# Patient Record
Sex: FEMALE | Race: WHITE | NOT HISPANIC OR LATINO | Employment: OTHER | URBAN - NONMETROPOLITAN AREA
[De-identification: names, ages, dates, MRNs, and addresses within clinical notes are randomized per-mention and may not be internally consistent; named-entity substitution may affect disease eponyms.]

---

## 2017-01-04 ENCOUNTER — OFFICE VISIT (OUTPATIENT)
Dept: FAMILY MEDICINE CLINIC | Facility: CLINIC | Age: 82
End: 2017-01-04

## 2017-01-04 VITALS
SYSTOLIC BLOOD PRESSURE: 118 MMHG | HEIGHT: 66 IN | BODY MASS INDEX: 30.05 KG/M2 | DIASTOLIC BLOOD PRESSURE: 68 MMHG | WEIGHT: 187 LBS

## 2017-01-04 DIAGNOSIS — G47.00 INSOMNIA, UNSPECIFIED TYPE: Primary | ICD-10-CM

## 2017-01-04 PROCEDURE — 99213 OFFICE O/P EST LOW 20 MIN: CPT | Performed by: FAMILY MEDICINE

## 2017-01-04 RX ORDER — CEFDINIR 300 MG/1
CAPSULE ORAL
COMMUNITY
Start: 2016-12-29 | End: 2017-03-03

## 2017-01-04 RX ORDER — ALPRAZOLAM 0.25 MG/1
TABLET ORAL
Qty: 90 TABLET | Refills: 0 | Status: SHIPPED | OUTPATIENT
Start: 2017-01-04 | End: 2017-04-10

## 2017-01-04 NOTE — MR AVS SNAPSHOT
Nyla ARNOLD Wang   1/4/2017 2:30 PM   Office Visit    Dept Phone:  [phone]   Encounter #:  [id number]    Provider:  Chantel Long MD   Department:  [facility] PRIMARY CARE [facility]                Your Full Care Plan              Where to Get Your Medications      You can get these medications from any pharmacy     Bring a paper prescription for each of these medications     ALPRAZolam 0.25 MG tablet            Your Updated Medication List          This list is accurate as of: 1/4/17  2:52 PM.  Always use your most recent med list.                ALPRAZolam 0.25 MG tablet   Commonly known as:  XANAX   Take 1 tablet as needed for anxiety       aspirin 81 MG tablet       CALCIUM 600 PO       cefdinir 300 MG capsule   Commonly known as:  OMNICEF       DULoxetine 60 MG capsule   Commonly known as:  CYMBALTA   Take 1 capsule by mouth Daily.       ergocalciferol 95620 UNITS capsule   Commonly known as:  ERGOCALCIFEROL   Take 1 capsule by mouth 1 (one) time per week.       fluticasone 50 MCG/ACT nasal spray   Commonly known as:  FLONASE       gabapentin 300 MG capsule   Commonly known as:  NEURONTIN   Take 1 capsule by mouth 3 (Three) Times a Day. TAKE ONE CAPSULE EVERY MORNING AND 2 CAPSULES EVERY EVENING       HYDROcodone-acetaminophen 7.5-325 MG per tablet   Commonly known as:  NORCO       Insulin Glargine 100 UNIT/ML injection pen   Commonly known as:  LANTUS SOLOSTAR   Inject 15 Units under the skin every night.       levothyroxine 100 MCG tablet   Commonly known as:  SYNTHROID, LEVOTHROID   Take 1 tablet by mouth Daily.       lisinopril 20 MG tablet   Commonly known as:  PRINIVIL,ZESTRIL       loratadine 10 MG tablet   Commonly known as:  CLARITIN   1 TABLET(S) BY MOUTH DAILY       LORazepam 1 MG tablet   Commonly known as:  ATIVAN   Take 1 tablet by mouth At Night As Needed for anxiety or sleep.       metFORMIN 850 MG tablet   Commonly known as:  GLUCOPHAGE   Take 1  "tablet by mouth 2 (Two) Times a Day With Meals.       Needle (Disp) 31G X 5/16\" misc   1 each 4 (Four) Times a Day. Pen Needle;       nitrofurantoin (macrocrystal-monohydrate) 100 MG capsule   Commonly known as:  MACROBID   Take 1 capsule by mouth 2 (Two) Times a Day.       * insulin aspart 100 UNIT/ML solution pen-injector sc pen   Commonly known as:  novoLOG FLEXPEN   Inject 2-8 Units under the skin 3 (three) times a day with meals. INJECT 2-8 UNITS THREE TIMES A DAY BEFORE MEALS       * NOVOLOG FLEXPEN 100 UNIT/ML solution pen-injector sc pen   Generic drug:  insulin aspart   INJECT 2-8 UNITS SUBCUTANEOUSLY THREE TIMES A DAY BEFORE MEALS       simvastatin 20 MG tablet   Commonly known as:  ZOCOR       sulfamethoxazole-trimethoprim 800-160 MG per tablet   Commonly known as:  BACTRIM DS   Take 1 tablet by mouth 2 (two) times a day.       traZODone 50 MG tablet   Commonly known as:  DESYREL   Take 1 tablet by mouth every night. 1-2 tablets at bedtime       TRUEPLUS LANCETS 33G misc   1 each 4 (four) times a day.       * glucose blood test strip   Commonly known as:  TRUETEST TEST   USE TO TEST FOUR TIMES A DAY       * TRUETEST TEST test strip   Generic drug:  glucose blood   USE TO TEST FOUR TIMES A DAY       VENTOLIN HFA IN       * Notice:  This list has 4 medication(s) that are the same as other medications prescribed for you. Read the directions carefully, and ask your doctor or other care provider to review them with you.            Instructions     None    Patient Instructions History      Upcoming Appointments     Visit Type Date Time Department    OFFICE VISIT 1/4/2017  2:30 PM [facility]    FOLLOW UP 1/11/2017  9:15 AM [facility] ORTHOPEDIC [facility]    FOLLOW UP 3/22/2017 10:15 AM [facility] ENDOCRINOLOGY [facility]      MyChart Signup     Our records indicate that you have declined [facility] signup. If you would like to sign up for [employer], please email [email or web address] or call [phone] to obtain an " "activation code.             Other Info from Your Visit           Your Appointments     Jan 11, 2017  9:15 AM CST   Follow Up with Hieu Lozano MD   [facility] ORTHOPEDICS (--)    [address] [place] [address] [address] [place] [address] [phone]           Arrive 15 minutes prior to appointment.            Mar 22, 2017 10:15 AM CDT   Follow Up with MARTHA Gonzalez   [facility] ENDOCRINOLOGY (--)    [address]  [address] [place] [id number] [phone]           Arrive 15 minutes prior to appointment.              Allergies     Penicillins  Swelling, Rash      Reason for Visit     Nasal Congestion was seen in urgent care last thurs.    Cough better with tessalon      Vital Signs     Blood Pressure Height Weight Body Mass Index Smoking Status       118/68 66\" (167.6 cm) 187 lb (84.8 kg) 30.18 kg/m2 Never Smoker         "

## 2017-01-04 NOTE — PROGRESS NOTES
Subjective   Nyla Piña is a 81 y.o. female.  She's here for follow-up on anxiety and difficulty sleeping.  It started when her  passed.  She feels very lonely but denies overt depression.  She takes Xanax most nights to help her relax and sleep is working well    History of Present Illness     The following portions of the patient's history were reviewed and updated as appropriate: allergies, current medications, past family history, past medical history, past social history, past surgical history and problem list.    Review of Systems   Constitutional: Negative.    HENT: Negative.    Respiratory: Negative.  Negative for shortness of breath.    Cardiovascular: Negative.  Negative for chest pain.   Gastrointestinal: Negative.    Musculoskeletal: Negative.  Negative for myalgias.   Skin: Negative.    Allergic/Immunologic: Negative for immunocompromised state.   Neurological: Negative for dizziness, tremors, seizures, syncope, weakness and numbness.   Hematological: Negative.    Psychiatric/Behavioral: Positive for sleep disturbance. Negative for agitation, confusion and dysphoric mood. The patient is not nervous/anxious.        Objective   Physical Exam   Constitutional: She is oriented to person, place, and time. She appears well-developed and well-nourished.   HENT:   Head: Normocephalic and atraumatic.   Nose: Nose normal.   Mouth/Throat: Oropharynx is clear and moist.   Eyes: Conjunctivae and EOM are normal. Pupils are equal, round, and reactive to light.   Neck: Normal range of motion. Neck supple. No JVD present. No tracheal deviation present. No thyromegaly present.   Cardiovascular: Normal rate, regular rhythm, normal heart sounds and intact distal pulses.    No murmur heard.  Pulmonary/Chest: Effort normal and breath sounds normal. She has no wheezes.   Abdominal: Soft. Bowel sounds are normal. She exhibits no distension. There is no tenderness.   Musculoskeletal: Normal range of motion. She  exhibits no edema.   Lymphadenopathy:     She has no cervical adenopathy.   Neurological: She is alert and oriented to person, place, and time. Coordination normal.   Skin: Skin is warm and dry. No rash noted.   Psychiatric: She has a normal mood and affect.   Nursing note and vitals reviewed.      Assessment/Plan   Nyla was seen today for nasal congestion and cough.    Diagnoses and all orders for this visit:    Insomnia, unspecified type    Other orders  -     ALPRAZolam (XANAX) 0.25 MG tablet; Take 1 tablet as needed for anxiety    The patient has read and signed the [facility] Controlled Substance Contract.  I will continue to see patient for regular follow up appointments. Patient is well controlled on the medication.  ANGELITO has been reviewed by me and is updated every 3 months. The patient is aware of the potential for addiction and dependence.   We'll continue Xanax as it helps with her anxiety and sleeplessness at night, recheck in 3 months

## 2017-01-05 RX ORDER — LORAZEPAM 1 MG/1
1 TABLET ORAL NIGHTLY PRN
Qty: 90 TABLET | Refills: 0 | Status: SHIPPED | OUTPATIENT
Start: 2017-01-05 | End: 2017-04-10 | Stop reason: SDUPTHER

## 2017-01-11 ENCOUNTER — OFFICE VISIT (OUTPATIENT)
Dept: ORTHOPEDIC SURGERY | Facility: CLINIC | Age: 82
End: 2017-01-11

## 2017-01-11 VITALS — HEIGHT: 66 IN | BODY MASS INDEX: 29.57 KG/M2 | WEIGHT: 184 LBS

## 2017-01-11 DIAGNOSIS — M25.561 CHRONIC PAIN OF BOTH KNEES: Primary | ICD-10-CM

## 2017-01-11 DIAGNOSIS — M25.562 CHRONIC PAIN OF BOTH KNEES: Primary | ICD-10-CM

## 2017-01-11 DIAGNOSIS — E03.9 HYPOTHYROIDISM, UNSPECIFIED TYPE: ICD-10-CM

## 2017-01-11 DIAGNOSIS — E11.9 TYPE 2 DIABETES MELLITUS WITHOUT COMPLICATION, WITH LONG-TERM CURRENT USE OF INSULIN (HCC): ICD-10-CM

## 2017-01-11 DIAGNOSIS — G89.29 CHRONIC PAIN OF BOTH KNEES: Primary | ICD-10-CM

## 2017-01-11 DIAGNOSIS — M17.0 PRIMARY OSTEOARTHRITIS OF BOTH KNEES: ICD-10-CM

## 2017-01-11 DIAGNOSIS — Z79.4 TYPE 2 DIABETES MELLITUS WITHOUT COMPLICATION, WITH LONG-TERM CURRENT USE OF INSULIN (HCC): ICD-10-CM

## 2017-01-11 PROCEDURE — 99213 OFFICE O/P EST LOW 20 MIN: CPT | Performed by: ORTHOPAEDIC SURGERY

## 2017-01-11 PROCEDURE — 20610 DRAIN/INJ JOINT/BURSA W/O US: CPT | Performed by: ORTHOPAEDIC SURGERY

## 2017-01-11 RX ADMIN — TRIAMCINOLONE ACETONIDE 40 MG: 40 INJECTION, SUSPENSION INTRA-ARTICULAR; INTRAMUSCULAR at 10:16

## 2017-01-11 RX ADMIN — LIDOCAINE HYDROCHLORIDE 2 ML: 20 INJECTION, SOLUTION INFILTRATION; PERINEURAL at 10:16

## 2017-01-11 NOTE — MR AVS SNAPSHOT
"                        Nyla Burroughsvis   1/11/2017 9:15 AM   Office Visit    Dept Phone:  [phone]   Encounter #:  [id number]    Provider:  Hieu Lozano MD   Department:  [facility] ORTHOPEDICS                Your Full Care Plan              Your Updated Medication List          This list is accurate as of: 1/11/17 11:02 AM.  Always use your most recent med list.                ALPRAZolam 0.25 MG tablet   Commonly known as:  XANAX   Take 1 tablet as needed for anxiety       aspirin 81 MG tablet       CALCIUM 600 PO       cefdinir 300 MG capsule   Commonly known as:  OMNICEF       DULoxetine 60 MG capsule   Commonly known as:  CYMBALTA   Take 1 capsule by mouth Daily.       ergocalciferol 92591 UNITS capsule   Commonly known as:  ERGOCALCIFEROL   Take 1 capsule by mouth 1 (one) time per week.       fluticasone 50 MCG/ACT nasal spray   Commonly known as:  FLONASE       gabapentin 300 MG capsule   Commonly known as:  NEURONTIN   Take 1 capsule by mouth 3 (Three) Times a Day. TAKE ONE CAPSULE EVERY MORNING AND 2 CAPSULES EVERY EVENING       HYDROcodone-acetaminophen 7.5-325 MG per tablet   Commonly known as:  NORCO       Insulin Glargine 100 UNIT/ML injection pen   Commonly known as:  LANTUS SOLOSTAR   Inject 15 Units under the skin every night.       levothyroxine 100 MCG tablet   Commonly known as:  SYNTHROID, LEVOTHROID   Take 1 tablet by mouth Daily.       lisinopril 20 MG tablet   Commonly known as:  PRINIVIL,ZESTRIL       loratadine 10 MG tablet   Commonly known as:  CLARITIN   1 TABLET(S) BY MOUTH DAILY       LORazepam 1 MG tablet   Commonly known as:  ATIVAN   Take 1 tablet by mouth At Night As Needed for anxiety or sleep.       metFORMIN 850 MG tablet   Commonly known as:  GLUCOPHAGE   Take 1 tablet by mouth 2 (Two) Times a Day With Meals.       Needle (Disp) 31G X 5/16\" misc   1 each 4 (Four) Times a Day. Pen Needle;       nitrofurantoin (macrocrystal-monohydrate) 100 MG " capsule   Commonly known as:  MACROBID   Take 1 capsule by mouth 2 (Two) Times a Day.       * insulin aspart 100 UNIT/ML solution pen-injector sc pen   Commonly known as:  novoLOG FLEXPEN   Inject 2-8 Units under the skin 3 (three) times a day with meals. INJECT 2-8 UNITS THREE TIMES A DAY BEFORE MEALS       * NOVOLOG FLEXPEN 100 UNIT/ML solution pen-injector sc pen   Generic drug:  insulin aspart   INJECT 2-8 UNITS SUBCUTANEOUSLY THREE TIMES A DAY BEFORE MEALS       simvastatin 20 MG tablet   Commonly known as:  ZOCOR       sulfamethoxazole-trimethoprim 800-160 MG per tablet   Commonly known as:  BACTRIM DS   Take 1 tablet by mouth 2 (two) times a day.       traZODone 50 MG tablet   Commonly known as:  DESYREL   Take 1 tablet by mouth every night. 1-2 tablets at bedtime       TRUEPLUS LANCETS 33G misc   1 each 4 (four) times a day.       * glucose blood test strip   Commonly known as:  TRUETEST TEST   USE TO TEST FOUR TIMES A DAY       * TRUETEST TEST test strip   Generic drug:  glucose blood   USE TO TEST FOUR TIMES A DAY       VENTOLIN HFA IN       * Notice:  This list has 4 medication(s) that are the same as other medications prescribed for you. Read the directions carefully, and ask your doctor or other care provider to review them with you.            We Performed the Following     Ambulatory Referral to Physical Therapy Evaluate and treat     Large Joint Arthrocentesis       You Were Diagnosed With        Codes Comments    Chronic pain of both knees    -  Primary ICD-10-CM: M25.561, M25.562, G89.29  ICD-9-CM: 719.46, 338.29     Primary osteoarthritis of both knees     ICD-10-CM: M17.0  ICD-9-CM: 715.16       Instructions     None    Patient Instructions History      Upcoming Appointments     Visit Type Date Time Department    FOLLOW UP 1/11/2017  9:15 AM [facility] ORTHOPEDIC POW    FOLLOW UP 3/22/2017 10:15 AM [facility] ENDOCRINOLOGY BARBARA Ward Signup     Our records indicate that you have declined [facility]  "MyChart signup. If you would like to sign up for [employer], please email [email or web address] or call [phone] to obtain an activation code.             Other Info from Your Visit           Your Appointments     Mar 22, 2017 10:15 AM CDT   Follow Up with MARTHA Gonzalez   [facility] ENDOCRINOLOGY (--)    [address]  [facility] [address] [place], [place] [id number]   [phone]           Arrive 15 minutes prior to appointment.              Allergies     Penicillins  Swelling, Rash      Reason for Visit     Left Knee - Follow-up     Right Knee - Follow-up           Vital Signs     Height Weight Body Mass Index Smoking Status          66\" (167.6 cm) 184 lb (83.5 kg) 29.7 kg/m2 Never Smoker        Problems and Diagnoses Noted     Chronic pain of both knees    Primary osteoarthritis of both knees        "

## 2017-01-11 NOTE — PROGRESS NOTES
Nyla Piña is a 81 y.o. female returns for     Chief Complaint   Patient presents with   • Left Knee - Follow-up   • Right Knee - Follow-up       HISTORY OF PRESENT ILLNESS:patient completed series of orthovisc injections on 8/2/2016, did not help much.   She is complaining of pain with activity in her right knee.  Her pain is mostly a dull ache but she does have some occasional sharp pains.  Previous injections did not help.  They did help on her left side.     CONCURRENT MEDICAL HISTORY:    Past Medical History   Diagnosis Date   • Acute bronchitis    • Acute sinusitis    • Acute upper respiratory infection    • Anxiety    • Cellulitis of lower leg    • Chronic urinary tract infection    • Cough    • Death of     • Diabetic asymmetric polyneuropathy    • Diabetic peripheral neuropathy    • Diabetic polyneuropathy    • Disease of nail    • Dorsalgia    • Flank pain    • Hashimoto's thyroiditis    • History of echocardiogram 02/19/2013   • Hypertensive disorder    • Insomnia    • Mixed hyperlipidemia    • Non-healing surgical wound    • Osteoarthritis of multiple joints    • Peripheral vascular disease    • Seasonal allergic rhinitis    • Type II diabetes mellitus, uncontrolled    • Upper respiratory infection    • Urinary tract infectious disease    • Urinary tract infectious disease    • Vitamin D deficiency        Allergies   Allergen Reactions   • Penicillins Swelling and Rash         Current Outpatient Prescriptions:   •  Albuterol Sulfate (VENTOLIN HFA IN), Inhale 2 puffs every 4 (four) hours as needed. Ventolin HFA 90 mcg, Disp: , Rfl:   •  ALPRAZolam (XANAX) 0.25 MG tablet, Take 1 tablet as needed for anxiety, Disp: 90 tablet, Rfl: 0  •  aspirin 81 MG tablet, Take 81 mg by mouth daily., Disp: , Rfl:   •  Calcium Carbonate (CALCIUM 600 PO), Take 2 tablets by mouth daily., Disp: , Rfl:   •  cefdinir (OMNICEF) 300 MG capsule, , Disp: , Rfl:   •  DULoxetine (CYMBALTA) 60 MG capsule, Take 1 capsule  "by mouth Daily., Disp: 90 capsule, Rfl: 1  •  ergocalciferol (ERGOCALCIFEROL) 93661 UNITS capsule, Take 1 capsule by mouth 1 (one) time per week., Disp: 12 capsule, Rfl: 3  •  fluticasone (FLONASE) 50 MCG/ACT nasal spray, 1 spray into each nostril 2 (two) times a day., Disp: , Rfl:   •  gabapentin (NEURONTIN) 300 MG capsule, Take 1 capsule by mouth 3 (Three) Times a Day. TAKE ONE CAPSULE EVERY MORNING AND 2 CAPSULES EVERY EVENING, Disp: 270 capsule, Rfl: 1  •  glucose blood (TRUETEST TEST) test strip, USE TO TEST FOUR TIMES A DAY, Disp: 100 each, Rfl: 5  •  HYDROcodone-acetaminophen (NORCO) 7.5-325 MG per tablet, Take 1 tablet by mouth 2 (two) times a day., Disp: , Rfl:   •  insulin aspart (NOVOLOG FLEXPEN) 100 UNIT/ML solution pen-injector sc pen, Inject 2-8 Units under the skin 3 (three) times a day with meals. INJECT 2-8 UNITS THREE TIMES A DAY BEFORE MEALS, Disp: 8 package, Rfl: 2  •  Insulin Glargine (LANTUS SOLOSTAR) 100 UNIT/ML injection pen, Inject 15 Units under the skin every night., Disp: 9 mL, Rfl: 5  •  levothyroxine (SYNTHROID, LEVOTHROID) 100 MCG tablet, Take 1 tablet by mouth Daily., Disp: 90 tablet, Rfl: 1  •  lisinopril (PRINIVIL,ZESTRIL) 20 MG tablet, Take 20 mg by mouth daily., Disp: , Rfl:   •  loratadine (CLARITIN) 10 MG tablet, 1 TABLET(S) BY MOUTH DAILY, Disp: 30 tablet, Rfl: 5  •  LORazepam (ATIVAN) 1 MG tablet, Take 1 tablet by mouth At Night As Needed for anxiety or sleep., Disp: 90 tablet, Rfl: 0  •  metFORMIN (GLUCOPHAGE) 850 MG tablet, Take 1 tablet by mouth 2 (Two) Times a Day With Meals., Disp: 180 tablet, Rfl: 1  •  Needle, Disp, 31G X 5/16\" misc, 1 each 4 (Four) Times a Day. Pen Needle;, Disp: 400 each, Rfl: 1  •  nitrofurantoin, macrocrystal-monohydrate, (MACROBID) 100 MG capsule, Take 1 capsule by mouth 2 (Two) Times a Day., Disp: 20 capsule, Rfl: 0  •  NOVOLOG FLEXPEN 100 UNIT/ML solution pen-injector sc pen, INJECT 2-8 UNITS SUBCUTANEOUSLY THREE TIMES A DAY BEFORE MEALS, Disp: " "15 mL, Rfl: 1  •  simvastatin (ZOCOR) 20 MG tablet, Take 20 mg by mouth every night., Disp: , Rfl:   •  traZODone (DESYREL) 50 MG tablet, Take 1 tablet by mouth every night. 1-2 tablets at bedtime, Disp: 180 tablet, Rfl: 0  •  TRUEPLUS LANCETS 33G misc, 1 each 4 (four) times a day., Disp: 4 each, Rfl: 0  •  TRUETEST TEST test strip, USE TO TEST FOUR TIMES A DAY, Disp: 120 each, Rfl: 3  •  sulfamethoxazole-trimethoprim (BACTRIM DS) 800-160 MG per tablet, Take 1 tablet by mouth 2 (two) times a day., Disp: 20 tablet, Rfl: 0    Past Surgical History   Procedure Laterality Date   • Steroid injection  02/08/2016     Depo Medrol (Methylprednisone) 80mg (Acute upper respiratory infection, unspecified)    • Excision mass leg Left 11/05/2013     Excision of soft tissue mass of left leg       ROS  No fevers or chills.  No chest pain or shortness of air.  No GI or  disturbances.    PHYSICAL EXAMINATION:       Visit Vitals   • Ht 66\" (167.6 cm)   • Wt 184 lb (83.5 kg)   • BMI 29.7 kg/m2       Physical Exam   Constitutional: She is oriented to person, place, and time. She appears well-developed and well-nourished.   Neurological: She is alert and oriented to person, place, and time.   Psychiatric: She has a normal mood and affect. Her behavior is normal. Judgment and thought content normal.       GAIT:     []  Normal  [x]  Antalgic    Assistive device: []  None  []  Walker     []  Crutches  [x]  Cane     []  Wheelchair  []  Stretcher    Right Knee Exam     Tenderness   Right knee tenderness location: diffuse.    Range of Motion   Extension: -5   Flexion: 120     Muscle Strength     The patient has normal right knee strength.    Tests   Drawer:       Anterior - negative    Posterior - negative  Varus: negative  Valgus: negative    Other   Sensation: normal  Pulse: present  Swelling: mild    Comments:  Crepitation on motion.  Mild to moderate pain through arc of motion      Left Knee Exam     Tenderness   Left knee tenderness " location: diffuse.    Range of Motion   Extension: -5   Flexion: 120     Muscle Strength     The patient has normal left knee strength.    Tests   Drawer:       Anterior - negative     Posterior - negative  Varus: negative  Valgus: negative    Other   Sensation: normal  Pulse: present  Swelling: none    Comments:  Crepitation with motion  Mild to moderate pain through arc of motion                  Large Joint Arthrocentesis  Date/Time: 1/11/2017 10:16 AM  Consent given by: patient  Site marked: site marked  Timeout: Immediately prior to procedure a time out was called to verify the correct patient, procedure, equipment, support staff and site/side marked as required   Supporting Documentation  Indications: pain   Procedure Details  Location: knee - R knee  Preparation: Patient was prepped and draped in the usual sterile fashion  Needle size: 22 G  Approach: anteromedial  Medications administered: 40 mg triamcinolone acetonide 40 MG/ML; 2 mL lidocaine 2 %  Patient tolerance: patient tolerated the procedure well with no immediate complications              ASSESSMENT:    Diagnoses and all orders for this visit:    Chronic pain of both knees  -     Large Joint Arthrocentesis  -     Ambulatory Referral to Physical Therapy Evaluate and treat    Primary osteoarthritis of both knees  -     Large Joint Arthrocentesis  -     Ambulatory Referral to Physical Therapy Evaluate and treat    Type 2 diabetes mellitus without complication, with long-term current use of insulin    Hypothyroidism, unspecified type          PLAN    Long discussion regarding treatment modalities for osteoarthritis of the knees.  Patient does not want to pursue any kind of surgical intervention at this time.  The Visco supplementation was not beneficial on the right leg.  Her symptoms are improved on the left and therefore we discussed steroid injection into the right knee.  We discussed risks benefits and alternatives to steroid injection patient  conveyed understanding and was in agreement to proceed.  We discussed the possibility of elevated blood sugar and she was going to closely monitor her blood sugar as necessary.  She also is going to begin physical therapy for range of motion strengthening and conditioning and the institution of a home exercise program.    Return in about 3 months (around 4/11/2017).    Hieu Lozano MD

## 2017-01-15 PROBLEM — Z79.4 TYPE 2 DIABETES MELLITUS WITHOUT COMPLICATION, WITH LONG-TERM CURRENT USE OF INSULIN (HCC): Status: ACTIVE | Noted: 2017-01-15

## 2017-01-15 PROBLEM — E11.9 TYPE 2 DIABETES MELLITUS WITHOUT COMPLICATION, WITH LONG-TERM CURRENT USE OF INSULIN (HCC): Status: ACTIVE | Noted: 2017-01-15

## 2017-01-15 RX ORDER — LIDOCAINE HYDROCHLORIDE 20 MG/ML
2 INJECTION, SOLUTION INFILTRATION; PERINEURAL
Status: COMPLETED | OUTPATIENT
Start: 2017-01-11 | End: 2017-01-11

## 2017-01-15 RX ORDER — TRIAMCINOLONE ACETONIDE 40 MG/ML
40 INJECTION, SUSPENSION INTRA-ARTICULAR; INTRAMUSCULAR
Status: COMPLETED | OUTPATIENT
Start: 2017-01-11 | End: 2017-01-11

## 2017-01-20 ENCOUNTER — CONSULT (OUTPATIENT)
Dept: PHYSICAL THERAPY | Facility: CLINIC | Age: 82
End: 2017-01-20

## 2017-01-20 DIAGNOSIS — G89.29 BILATERAL CHRONIC KNEE PAIN: Primary | ICD-10-CM

## 2017-01-20 DIAGNOSIS — M25.562 BILATERAL CHRONIC KNEE PAIN: Primary | ICD-10-CM

## 2017-01-20 DIAGNOSIS — M25.561 BILATERAL CHRONIC KNEE PAIN: Primary | ICD-10-CM

## 2017-01-20 PROCEDURE — 97162 PT EVAL MOD COMPLEX 30 MIN: CPT | Performed by: PHYSICAL THERAPIST

## 2017-01-20 PROCEDURE — G8978 MOBILITY CURRENT STATUS: HCPCS | Performed by: PHYSICAL THERAPIST

## 2017-01-20 PROCEDURE — 97110 THERAPEUTIC EXERCISES: CPT | Performed by: PHYSICAL THERAPIST

## 2017-01-20 PROCEDURE — G8979 MOBILITY GOAL STATUS: HCPCS | Performed by: PHYSICAL THERAPIST

## 2017-01-20 NOTE — MR AVS SNAPSHOT
"  [facility] PHYSICAL THERAPY  [phone]                    Nyla Piña   1/20/2017 9:30 AM   Consult    Dept Phone:  [phone]   Encounter #:  [id number]    Provider:  Edith Rhoades PT   Department:  [facility] PHYSICAL THERAPY                Your Full Care Plan              Your Updated Medication List          This list is accurate as of: 1/20/17 10:12 AM.  Always use your most recent med list.                ALPRAZolam 0.25 MG tablet   Commonly known as:  XANAX   Take 1 tablet as needed for anxiety       aspirin 81 MG tablet       CALCIUM 600 PO       cefdinir 300 MG capsule   Commonly known as:  OMNICEF       DULoxetine 60 MG capsule   Commonly known as:  CYMBALTA   Take 1 capsule by mouth Daily.       ergocalciferol 54712 UNITS capsule   Commonly known as:  ERGOCALCIFEROL   Take 1 capsule by mouth 1 (one) time per week.       fluticasone 50 MCG/ACT nasal spray   Commonly known as:  FLONASE       gabapentin 300 MG capsule   Commonly known as:  NEURONTIN   Take 1 capsule by mouth 3 (Three) Times a Day. TAKE ONE CAPSULE EVERY MORNING AND 2 CAPSULES EVERY EVENING       HYDROcodone-acetaminophen 7.5-325 MG per tablet   Commonly known as:  NORCO       Insulin Glargine 100 UNIT/ML injection pen   Commonly known as:  LANTUS SOLOSTAR   Inject 15 Units under the skin every night.       levothyroxine 100 MCG tablet   Commonly known as:  SYNTHROID, LEVOTHROID   Take 1 tablet by mouth Daily.       lisinopril 20 MG tablet   Commonly known as:  PRINIVIL,ZESTRIL       loratadine 10 MG tablet   Commonly known as:  CLARITIN   1 TABLET(S) BY MOUTH DAILY       LORazepam 1 MG tablet   Commonly known as:  ATIVAN   Take 1 tablet by mouth At Night As Needed for anxiety or sleep.       metFORMIN 850 MG tablet   Commonly known as:  GLUCOPHAGE   Take 1 tablet by mouth 2 (Two) Times a Day With Meals.       Needle (Disp) 31G X 5/16\" misc   1 each 4 (Four) Times a Day. Pen Needle;       nitrofurantoin " (macrocrystal-monohydrate) 100 MG capsule   Commonly known as:  MACROBID   Take 1 capsule by mouth 2 (Two) Times a Day.       * insulin aspart 100 UNIT/ML solution pen-injector sc pen   Commonly known as:  novoLOG FLEXPEN   Inject 2-8 Units under the skin 3 (three) times a day with meals. INJECT 2-8 UNITS THREE TIMES A DAY BEFORE MEALS       * NOVOLOG FLEXPEN 100 UNIT/ML solution pen-injector sc pen   Generic drug:  insulin aspart   INJECT 2-8 UNITS SUBCUTANEOUSLY THREE TIMES A DAY BEFORE MEALS       simvastatin 20 MG tablet   Commonly known as:  ZOCOR       sulfamethoxazole-trimethoprim 800-160 MG per tablet   Commonly known as:  BACTRIM DS   Take 1 tablet by mouth 2 (two) times a day.       traZODone 50 MG tablet   Commonly known as:  DESYREL   Take 1 tablet by mouth every night. 1-2 tablets at bedtime       TRUEPLUS LANCETS 33G misc   1 each 4 (four) times a day.       * glucose blood test strip   Commonly known as:  TRUETEST TEST   USE TO TEST FOUR TIMES A DAY       * TRUETEST TEST test strip   Generic drug:  glucose blood   USE TO TEST FOUR TIMES A DAY       VENTOLIN HFA IN       * Notice:  This list has 4 medication(s) that are the same as other medications prescribed for you. Read the directions carefully, and ask your doctor or other care provider to review them with you.            We Performed the Following     PT Plan of Care Cert / Re-Cert       You Were Diagnosed With        Codes Comments    Bilateral chronic knee pain    -  Primary ICD-10-CM: M25.561, M25.562, G89.29  ICD-9-CM: 719.46, 338.29       Instructions     None    Patient Instructions History      Upcoming Appointments     Visit Type Date Time Department    EVALUATION 1/20/2017  9:30 AM W [facility] TH[facility]    FOLLOW UP 3/22/2017 10:15 AM W ENDOCRINOLOGY [facility]      MyChart Signup     Our records indicate that you have declined [facility] sign[facility]. If you would like to sign up for [facility], please email [email or web address]  or call [phone] to obtain an activation code.             Other Info from Your Visit           Your Appointments     Mar 22, 2017 10:15 AM CDT   Follow Up with MARTHA Gonzalez   [facility] ENDOCRINOLOGY (--)    [address]  [facility] [place] [address] [place] [id number]   [phone]           Arrive 15 minutes prior to appointment.              Allergies     Penicillins  Swelling, Rash      Reason for Visit     Right Knee - Pain     Left Knee - Pain     PT Initial Evaluation           Vital Signs     Smoking Status                   Never Smoker           Problems and Diagnoses Noted     Bilateral chronic knee pain    -  Primary

## 2017-01-20 NOTE — PROGRESS NOTES
Outpatient Physical Therapy Ortho Initial Evaluation       Patient Name: Nyla Piña  : 1935  MRN: [id number]  Today's Date: 2017      Visit Date: 2017    TIME IN 9:23    TIME OUT 10:09    Patient Active Problem List   Diagnosis   • Acute bronchitis   • Acute sinusitis   • Acute upper respiratory infection   • Anxiety   • Cellulitis of lower leg   • Chronic urinary tract infection   • Cough   • Death of    • Diabetic asymmetric polyneuropathy   • Diabetic peripheral neuropathy   • Diabetic polyneuropathy   • Disease of nail   • Dorsalgia   • Flank pain   • Hashimoto's thyroiditis   • History of echocardiogram   • Hypertensive disorder   • Insomnia   • Mixed hyperlipidemia   • Non-healing surgical wound   • Osteoarthritis of multiple joints   • Peripheral vascular disease   • Seasonal allergic rhinitis   • Type II diabetes mellitus, uncontrolled   • Upper respiratory infection   • Urinary tract infectious disease   • Vitamin D deficiency   • Chronic pain of both knees   • Primary osteoarthritis of both knees   • Type 2 diabetes mellitus without complication, with long-term current use of insulin        Past Medical History   Diagnosis Date   • Acute bronchitis    • Acute sinusitis    • Acute upper respiratory infection    • Anxiety    • Cellulitis of lower leg    • Chronic urinary tract infection    • Cough    • Death of     • Diabetic asymmetric polyneuropathy    • Diabetic peripheral neuropathy    • Diabetic polyneuropathy    • Disease of nail    • Dorsalgia    • Flank pain    • Hashimoto's thyroiditis    • History of echocardiogram 2013   • Hypertensive disorder    • Insomnia    • Mixed hyperlipidemia    • Non-healing surgical wound    • Osteoarthritis of multiple joints    • Peripheral vascular disease    • Seasonal allergic rhinitis    • Type II diabetes mellitus, uncontrolled    • Upper respiratory infection    • Urinary tract infectious disease    • Urinary tract  infectious disease    • Vitamin D deficiency         Past Surgical History   Procedure Laterality Date   • Steroid injection  02/08/2016     Depo Medrol (Methylprednisone) 80mg (Acute upper respiratory infection, unspecified)    • Excision mass leg Left 11/05/2013     Excision of soft tissue mass of left leg        Outpatient Medications     Albuterol Sulfate (VENTOLIN HFA IN)      ALPRAZolam (XANAX) 0.25 MG tablet      aspirin 81 MG tablet      Calcium Carbonate (CALCIUM 600 PO)      cefdinir (OMNICEF) 300 MG capsule      DULoxetine (CYMBALTA) 60 MG capsule      ergocalciferol (ERGOCALCIFEROL) 95611 UNITS capsule      fluticasone (FLONASE) 50 MCG/ACT nasal spray      gabapentin (NEURONTIN) 300 MG capsule      glucose blood (TRUETEST TEST) test strip      HYDROcodone-acetaminophen (NORCO) 7.5-325 MG per tablet      insulin aspart (NOVOLOG FLEXPEN) 100 UNIT/ML solution pen-injector sc pen      Insulin Glargine (LANTUS SOLOSTAR) 100 UNIT/ML injection pen      levothyroxine (SYNTHROID, LEVOTHROID) 100 MCG tablet      lisinopril (PRINIVIL,ZESTRIL) 20 MG tablet      loratadine (CLARITIN) 10 MG tablet      LORazepam (ATIVAN) 1 MG tablet      metFORMIN (GLUCOPHAGE) 850 MG tablet            nitrofurantoin, macrocrystal-monohydrate, (MACROBID) 100 MG capsule      NOVOLOG FLEXPEN 100 UNIT/ML solution pen-injector sc pen      simvastatin (ZOCOR) 20 MG tablet      sulfamethoxazole-trimethoprim (BACTRIM DS) 800-160 MG per tablet      traZODone (DESYREL) 50 MG tablet      TRUEPLUS LANCETS 33G misc      TRUETEST TEST test strip    Allergies:        PenicillinsSwelling, Rash     Visit Dx:     ICD-10-CM ICD-9-CM   1. Bilateral chronic knee pain M25.561 719.46    M25.562 338.29    G89.29    Subjective Evaluation    History of Present Illness  Onset date: Cortisone shots for years 5 or more.  Mechanism of injury: Started synvisc injections in August the left one improved.  Right one was injected with cortisone last MD visit. Feels  much better now.    Quality of life: good    Pain  Current pain rating: 3  Quality: tight and pressure  Relieving factors: rest and medications  Aggravating factors: movement (Walking and standing)  Progression: improved    Social Support  Lives in: one-story house  Lives with: alone    Hand dominance: right    Diagnostic Tests  X-ray: abnormal    Treatments  Previous treatment: injection treatment  Patient Goals  Patient goals for therapy: decreased pain, increased strength, independence with ADLs/IADLs and increased motion  Patient goal: 1.  Flexion  Degrees  2.  Extension ROM 3  Degrees  3. LEFS score 40 /80.  4.  Patient to report 30% subjective improvement.   LT. Flexion   2. Extension 2 degrees  3. LEFS 44/80  4. Quadriceps and Hamstring MMT 5/5  5. Independent in HEP.    OBJECTIVE:     No assitive device.  Knees flexed   Diffuse TTP of both joints.    AROM RLE (degrees)  R Hip Flexion 0-125: 110  R Hip ABduction 0-45: 35  R Knee Flexion 0-140: 128     Strength RLE  R Hip Flexion: 4+/5  R Knee Flexion: 4+/5  R Knee Extension: 4+/5        AROM LLE (degrees)  L Hip Flexion 0-125: 110  L Knee Flexion 0-140: 127     Strength LLE  L Hip Flexion: 5-/5  L Knee Flexion: 4+/5  L Knee Extension: 4+/5        PROCEDURES AND MODALITIES:     EXERCISE  Exercise 1  Exercise Name 1: Isometric add  Sets/Reps 1: 30  Exercise 1 Completed: Yes  Exercise 1 Home Program: Yes  Exercise 2  Exercise Name 2: SAQ  Sets/Reps 2: 20  Exercise 2 Completed: Yes  Exercise 2 Home Program: Yes Exercise 3  Exercise Name 3: Quad sets/glut sets  Sets/Reps 3: 20  Exercise 3 Completed: Yes  Exercise 3 Home Program: Yes Exercise 4  Exercise Name 4: Ham sets  Sets/Reps 4: 20  Exercise 4 Home Program: Yes Exercise 5  Exercise Name 5: Clam shells  Sets/Reps 5: 20  Exercise 5 Home Program: Yes Exercise 6  Exercise Name 6: LAQ  Sets/Reps 6: 20  Exercise 6 Home Program: Yes                                           Assessment & Plan      Assessment  Impairments: pain with function  Prognosis: fair    Goals  1.  Flexion  Degrees  2.  Extension ROM 3  Degrees  3. LEFS score 40 /80.  4.  Patient to report 30% subjective improvement.   LT. Flexion   2. Extension 2 degrees  3. LEFS 44/80  4. Quadriceps and Hamstring MMT 5/5  5. Independent in HEP.    Plan  Therapy options: will be seen for skilled physical therapy services  Frequency: 1x week  Duration in weeks: 4  Treatment plan discussed with: patient  Plan details: Lower extremity strengthening. Gait and balance strengthening.    Time Calculation: 46      PT G-Codes  PT Professional Judgement Used?: Yes  Outcome Measure Options: Lower Extremity Functional Scale (LEFS)  Score: 33  Functional Limitation: Mobility: Walking and moving around  Mobility: Walking and Moving Around Current Status (): At least 40 percent but less than 60 percent impaired, limited or restricted  Mobility: Walking and Moving Around Goal Status (): At least 1 percent but less than 20 percent impaired, limited or restricted       Edith Rhoades, PT,  ATC, DPT  2017      Nyla Piña    1935

## 2017-01-30 ENCOUNTER — TREATMENT (OUTPATIENT)
Dept: PHYSICAL THERAPY | Facility: CLINIC | Age: 82
End: 2017-01-30

## 2017-01-30 DIAGNOSIS — M25.561 BILATERAL CHRONIC KNEE PAIN: Primary | ICD-10-CM

## 2017-01-30 DIAGNOSIS — G89.29 BILATERAL CHRONIC KNEE PAIN: Primary | ICD-10-CM

## 2017-01-30 DIAGNOSIS — M25.562 BILATERAL CHRONIC KNEE PAIN: Primary | ICD-10-CM

## 2017-01-30 PROCEDURE — 97110 THERAPEUTIC EXERCISES: CPT | Performed by: PHYSICAL THERAPIST

## 2017-01-30 NOTE — PROGRESS NOTES
Daily Progress Note    Time In: 12:30      Time Out: 1:13    ICD-10-CM ICD-9-CM   1. Bilateral chronic knee pain M25.561 719.46    M25.562 338.29    G89.29        Subjective   Pt c/o R knee pain. She has some trouble with 2 HEP exercises.   Patient reports to therapy 4/10 pain on numerical analogue scale.  Attendance  2/2 visits.       Objective     V cues necessary for correct TE performance. Corrected 2 HEP exercises that were bothering HEP.     AROM:    Knee flex: L 124°, R 125°.         EXERCISE  Exercise 1  Exercise Name 1: Bike   Sets/Reps 1: 3 min  Exercise 1 Completed: Yes  Exercise 2  Exercise Name 2: Hip add  Equipment/Resistance 2: 12x (Decreased reps sec to some pain after ex. )  Exercise 2 Completed: Yes Exercise 3  Exercise Name 3: Clamshells  B  Sets/Reps 3: 2/10x  Exercise 3 Completed: Yes Exercise 4  Exercise Name 4: SAQ  Equipment/Resistance 4: 1#  Sets/Reps 4: 2/10x  Exercise 4 Completed: Yes Exercise 5  Exercise Name 5: SLR  Sets/Reps 5: 2/10x  Exercise 5 Completed: Yes Exercise 6  Exercise Name 6: Bridges  Sets/Reps 6: 2/10x  Exercise 6 Completed: Yes   Exercise 7  Exercise Name 7: HS curls  Equipment/Resistance 7: yellow tb  Sets/Reps 7: 20x  Exercise 7 Completed: Yes Exercise 8  Exercise Name 8: LAQ  Equipment/Resistance 8: 2#  Sets/Reps 8: 20x  Exercise 8 Completed: Yes Exercise 9  Exercise Name 9: CR  Sets/Reps 9: 20x  Exercise 9 Completed: Yes Exercise 10  Exercise Name 10: Airex: [place]om[place], marching  Exercise 10 Completed: Yes                                   Therapy Exercise 79324 43 minutes    Total Treatment Time: 43 Minutes    Assessment/Plan   Knee ROM measurements about the same. Good tolerance of today's treatment. Pt compliant with HEP  Progress per Plan of Care             Andrea Fuller, PTA  Physical Therapist

## 2017-02-01 ENCOUNTER — TREATMENT (OUTPATIENT)
Dept: PHYSICAL THERAPY | Facility: CLINIC | Age: 82
End: 2017-02-01

## 2017-02-01 DIAGNOSIS — M25.561 BILATERAL CHRONIC KNEE PAIN: Primary | ICD-10-CM

## 2017-02-01 DIAGNOSIS — G89.29 BILATERAL CHRONIC KNEE PAIN: Primary | ICD-10-CM

## 2017-02-01 DIAGNOSIS — M25.562 BILATERAL CHRONIC KNEE PAIN: Primary | ICD-10-CM

## 2017-02-01 PROCEDURE — 97110 THERAPEUTIC EXERCISES: CPT | Performed by: PHYSICAL THERAPIST

## 2017-02-01 NOTE — PROGRESS NOTES
"Daily Progress Note    Time In: 2:43    Time Out: 3:24    ICD-10-CM ICD-9-CM   1. Bilateral chronic knee pain M25.561 719.46    M25.562 338.29    G89.29        Subjective   Pt reports continued R knee pain. She is doing HEP. Some mild pain increase after prev treatment.   Patient reports to therapy 4/10 pain on numerical analogue scale, and  \"some\" improvement.  Attendance  3/3 visits.       Objective    Amb with AG. V cues necessary for correct TE performance. No c/o increased pain during TE, or post.       EXERCISE  Exercise 1  Exercise Name 1: Bike   Sets/Reps 1: 5 min  Exercise 1 Completed: Yes  Exercise 2  Exercise Name 2: HS stretch  B  Sets/Reps 2: 2/30\"  Exercise 2 Completed: Yes Exercise 3  Exercise Name 3: SAQ  Sets/Reps 3: 2/20x  Exercise 3 Completed: Yes Exercise 4  Exercise Name 4: Hip add squeeze  Sets/Reps 4: 30x  Exercise 4 Completed: Yes Exercise 5  Exercise Name 5: SLR  Sets/Reps 5: 2/10x  Exercise 5 Completed: Yes Exercise 6  Exercise Name 6: Bridges  Sets/Reps 6: 2/20x  Exercise 6 Completed: Yes   Exercise 7  Exercise Name 7: HS curls  Equipment/Resistance 7: yellow tb  Sets/Reps 7: 20x  Exercise 7 Completed: Yes Exercise 8  Exercise Name 8: LAQ  Equipment/Resistance 8: 3#  Sets/Reps 8: 2/20x  Exercise 8 Completed: Yes Exercise 9  Exercise Name 9: Sidestep in // bars  Sets/Reps 9: 3 trips  Exercise 9 Completed: Yes Exercise 10  Exercise Name 10: CR  Sets/Reps 10: 20x  Exercise 10 Completed: Yes Exercise 11  Exercise Name 11: Mini squats  Sets/Reps 11: 2/10x  Exercise 11 Completed: Yes                                 Therapy Exercise 25521 41 minutes    Total Treatment Time: 41 Minutes    Assessment/Plan   Good tolerance of today's treatment. Pt compliant with HEP. Overall pain about the same.   Progress per Plan of Care             Andrea Fuller, PTA  Physical Therapist    "

## 2017-02-10 ENCOUNTER — TREATMENT (OUTPATIENT)
Dept: PHYSICAL THERAPY | Facility: CLINIC | Age: 82
End: 2017-02-10

## 2017-02-10 DIAGNOSIS — G89.29 BILATERAL CHRONIC KNEE PAIN: Primary | ICD-10-CM

## 2017-02-10 DIAGNOSIS — M25.561 BILATERAL CHRONIC KNEE PAIN: Primary | ICD-10-CM

## 2017-02-10 DIAGNOSIS — M25.562 BILATERAL CHRONIC KNEE PAIN: Primary | ICD-10-CM

## 2017-02-10 PROCEDURE — 97110 THERAPEUTIC EXERCISES: CPT | Performed by: PHYSICAL THERAPIST

## 2017-02-10 NOTE — PROGRESS NOTES
"Daily Progress Note    Time In: 0920      Time Out: 1004    ICD-10-CM ICD-9-CM   1. Bilateral chronic knee pain M25.561 719.46    M25.562 338.29    G89.29        Subjective   Pt reports being sore in the R arm. Pt states that the right knee hurts more than the Left.  Pre tx pain 3-4/10  Post pain- sore    Visits 4/4   Recert Date none   MD appointment    Pt reports   50  % improvement         Objective        AROM:         AROM LLE (degrees)  L Knee Flexion 0-140: 138  L Knee Extension 0-130: 5  AROM RLE (degrees)  R Knee Flexion 0-140: 141  R Knee Extension 0-130: 3  Strength LLE  L Hip Flexion: 4-/5  L Knee Flexion: 4+/5  L Knee Extension: 4+/5      Strength RLE  R Hip Flexion: 4/5  R Knee Flexion: 4+/5  R Knee Extension: 4+/5           PROCEDURES AND MODALITIES:                                     EXERCISE  Exercise 1  Exercise Name 1: Pro 2  Equipment/Resistance 1: L 4.0  Time: 8 min  Exercise 1 Completed: Yes  Exercise 2  Exercise Name 2: ST HS S  Sets/Reps 2: 2/30\"  Exercise 2 Completed: Yes Exercise 3  Exercise Name 3: incline S  Sets/Reps 3: 2/30\"  Exercise 3 Completed: Yes Exercise 4  Exercise Name 4: bridge with ADD  Sets/Reps 4: 20x  Exercise 4 Completed: Yes Exercise 5  Exercise Name 5: SAQ B  Equipment/Resistance 5: 2#  Sets/Reps 5: 25x  Exercise 5 Completed: Yes Exercise 6  Exercise Name 6: H/L hip ADD  Sets/Reps 6: 2/10  Time 6: 5 sec hold  Exercise 6 Completed: Yes   Exercise 7  Exercise Name 7: LAQ B  Equipment/Resistance 7: 3#  Sets/Reps 7: 20x Exercise 8  Exercise Name 8: seated ham curl B  Equipment/Resistance 8: red tb  Sets/Reps 8: 20x  Exercise 8 Completed: Yes Exercise 9  Exercise Name 9: seated marches  Sets/Reps 9: 20x  Exercise 9 Completed: Yes                                     MANUAL PT:                    Therapy Exercise 39505 44 minutes    Total Treatment Time: 44 Minutes    Assessment/Plan   Pt met STG 1, 4, LTG 1,5 this date. Pt DC'd to I HEP this date 2° out of visits.  VC's " needed for correct Therapeutic technique. No adverse effects with new exercise this date. Patient Tolerated Ther ex well this date.  Progress per Plan of Care             Jey Wayne PTA  Physical Therapist

## 2017-02-10 NOTE — PROGRESS NOTES
Discharge Summary  Discharge Summary from Physical Therapy Report      Dates  PT visit: 1/20/17  Number of Visits: 4/5    Discharge Status of Patient: DC to I HEP     Goals: Partially Met    Prognosis fair    Comments Pt out of visits this date.    Date of Discharge 2/10/2017        Jey Wayne, PTA  Physical Therapist

## 2017-02-24 PROCEDURE — 84443 ASSAY THYROID STIM HORMONE: CPT | Performed by: NURSE PRACTITIONER

## 2017-02-24 PROCEDURE — 82306 VITAMIN D 25 HYDROXY: CPT | Performed by: NURSE PRACTITIONER

## 2017-02-24 PROCEDURE — 83036 HEMOGLOBIN GLYCOSYLATED A1C: CPT | Performed by: NURSE PRACTITIONER

## 2017-03-03 ENCOUNTER — OFFICE VISIT (OUTPATIENT)
Dept: ENDOCRINOLOGY | Facility: CLINIC | Age: 82
End: 2017-03-03

## 2017-03-03 VITALS
HEIGHT: 66 IN | BODY MASS INDEX: 29.63 KG/M2 | WEIGHT: 184.4 LBS | DIASTOLIC BLOOD PRESSURE: 60 MMHG | SYSTOLIC BLOOD PRESSURE: 110 MMHG | HEART RATE: 79 BPM

## 2017-03-03 DIAGNOSIS — E55.9 VITAMIN D DEFICIENCY: ICD-10-CM

## 2017-03-03 DIAGNOSIS — E78.2 MIXED HYPERLIPIDEMIA: ICD-10-CM

## 2017-03-03 DIAGNOSIS — E11.9 TYPE 2 DIABETES MELLITUS WITHOUT COMPLICATION, WITH LONG-TERM CURRENT USE OF INSULIN (HCC): Primary | ICD-10-CM

## 2017-03-03 DIAGNOSIS — Z79.4 TYPE 2 DIABETES MELLITUS WITHOUT COMPLICATION, WITH LONG-TERM CURRENT USE OF INSULIN (HCC): Primary | ICD-10-CM

## 2017-03-03 DIAGNOSIS — E06.3 HASHIMOTO'S THYROIDITIS: ICD-10-CM

## 2017-03-03 PROCEDURE — 99214 OFFICE O/P EST MOD 30 MIN: CPT | Performed by: NURSE PRACTITIONER

## 2017-03-03 RX ORDER — TRAZODONE HYDROCHLORIDE 50 MG/1
TABLET ORAL
Qty: 180 TABLET | Refills: 0 | Status: SHIPPED | OUTPATIENT
Start: 2017-03-03 | End: 2017-03-03

## 2017-03-03 NOTE — PROGRESS NOTES
Subjective    Nyla Piña is a 81 y.o. female. she is here today for follow-up.    History of Present Illness       Duration/Timing:Diabetes mellitus type 2, Age at onset of diabetes : 58 years, Onset of symptoms gradual         Timing constant      quality near control      Severity Complications        Severity (Complications/Hospitalizations)  Secondary Macrovascular Complications: No CAD, No CVA, No PAD  Secondary Microvascular Complications: No Diabetic Nephropathy, Microalbuminuria, No proteinuria, No Diabetic Retinopathy, Diabetic Neuropathy     Context  Diabetes Regimen: Insulin, oral medication, Last HgbA1c 7.2 from Feb. 2017    Blood Glucose Readings      Several at goal      Lowest 85      Diet      Low carb      Exercise:Does not exercise     Associated Signs/Symptoms  Hyperglycemic Symptoms:Polyruria, polydipsia, polyphagia, No blurred vision, No weight gain  Hypoglycemic Episodes:No documented hypoglycemia         The following portions of the patient's history were reviewed and updated as appropriate:   Past Medical History   Diagnosis Date   • Acute bronchitis    • Acute sinusitis    • Acute upper respiratory infection    • Anxiety    • Cellulitis of lower leg    • Chronic urinary tract infection    • Cough    • Death of     • Diabetic asymmetric polyneuropathy    • Diabetic peripheral neuropathy    • Diabetic polyneuropathy    • Disease of nail    • Dorsalgia    • Flank pain    • Hashimoto's thyroiditis    • History of echocardiogram 02/19/2013   • Hypertensive disorder    • Insomnia    • Mixed hyperlipidemia    • Non-healing surgical wound    • Osteoarthritis of multiple joints    • Peripheral vascular disease    • Seasonal allergic rhinitis    • Type II diabetes mellitus, uncontrolled    • Upper respiratory infection    • Urinary tract infectious disease    • Urinary tract infectious disease    • Vitamin D deficiency      Past Surgical History   Procedure Laterality Date   • Steroid  injection  02/08/2016     Depo Medrol (Methylprednisone) 80mg (Acute upper respiratory infection, unspecified)    • Excision mass leg Left 11/05/2013     Excision of soft tissue mass of left leg     History reviewed. No pertinent family history.  OB History     No data available        Current Outpatient Prescriptions   Medication Sig Dispense Refill   • ALPRAZolam (XANAX) 0.25 MG tablet Take 1 tablet as needed for anxiety 90 tablet 0   • aspirin 81 MG tablet Take 81 mg by mouth daily.     • Calcium Carbonate (CALCIUM 600 PO) Take 2 tablets by mouth daily.     • DULoxetine (CYMBALTA) 60 MG capsule Take 1 capsule by mouth Daily. 90 capsule 1   • ergocalciferol (ERGOCALCIFEROL) 68699 UNITS capsule Take 1 capsule by mouth 1 (one) time per week. 12 capsule 3   • fluticasone (FLONASE) 50 MCG/ACT nasal spray 1 spray into each nostril 2 (two) times a day.     • gabapentin (NEURONTIN) 300 MG capsule Take 1 capsule by mouth 3 (Three) Times a Day. TAKE ONE CAPSULE EVERY MORNING AND 2 CAPSULES EVERY EVENING 270 capsule 1   • glucose blood (TRUETEST TEST) test strip Use to test four times daily 120 each 3   • HYDROcodone-acetaminophen (NORCO) 7.5-325 MG per tablet Take 1 tablet by mouth 2 (two) times a day.     • insulin aspart (novoLOG FLEXPEN) 100 UNIT/ML solution pen-injector sc pen Inject 2-8 Units under the skin 3 (Three) Times a Day With Meals. INJECT 2-8 UNITS THREE TIMES A DAY BEFORE MEALS 15 pen 3   • Insulin Glargine (LANTUS SOLOSTAR) 100 UNIT/ML injection pen Inject 15 Units under the skin every night. 9 mL 5   • levothyroxine (SYNTHROID, LEVOTHROID) 100 MCG tablet Take 1 tablet by mouth Daily. 90 tablet 1   • lisinopril (PRINIVIL,ZESTRIL) 20 MG tablet Take 20 mg by mouth daily.     • LORazepam (ATIVAN) 1 MG tablet Take 1 tablet by mouth At Night As Needed for anxiety or sleep. 90 tablet 0   • metFORMIN (GLUCOPHAGE) 850 MG tablet Take 1 tablet by mouth 2 (Two) Times a Day With Meals. 180 tablet 1   • Needle, Disp,  "31G X 5/16\" misc 1 each 4 (Four) Times a Day. Pen Needle; 400 each 3   • NOVOLOG FLEXPEN 100 UNIT/ML solution pen-injector sc pen INJECT 2-8 UNITS SUBCUTANEOUSLY THREE TIMES A DAY BEFORE MEALS 15 mL 1   • sulfamethoxazole-trimethoprim (BACTRIM DS) 800-160 MG per tablet Take 1 tablet by mouth 2 (two) times a day. 20 tablet 0   • TRUEPLUS LANCETS 33G misc 1 each 4 (four) times a day. 4 each 0     No current facility-administered medications for this visit.      Allergies   Allergen Reactions   • Penicillins Swelling and Rash     Social History     Social History   • Marital status:      Spouse name: N/A   • Number of children: N/A   • Years of education: N/A     Social History Main Topics   • Smoking status: Never Smoker   • Smokeless tobacco: Never Used   • Alcohol use No   • Drug use: None   • Sexual activity: Not Asked     Other Topics Concern   • None     Social History Narrative       Review of Systems  Review of Systems   Constitutional: Negative for activity change, appetite change, diaphoresis and fatigue.   HENT: Negative for congestion, dental problem, drooling, ear discharge, ear pain, facial swelling, sneezing, sore throat, tinnitus, trouble swallowing and voice change.    Eyes: Negative for photophobia, pain, discharge, redness, itching and visual disturbance.   Respiratory: Negative for apnea, cough, choking, chest tightness and shortness of breath.    Cardiovascular: Negative for chest pain, palpitations and leg swelling.   Gastrointestinal: Negative for abdominal distention, abdominal pain, constipation, diarrhea, nausea and vomiting.   Endocrine: Negative for cold intolerance, heat intolerance, polydipsia, polyphagia and polyuria.   Genitourinary: Negative for difficulty urinating, dysuria, frequency, hematuria and urgency.   Musculoskeletal: Negative for arthralgias, back pain, gait problem, joint swelling, myalgias, neck pain and neck stiffness.   Skin: Negative for color change, pallor, rash " "and wound.   Allergic/Immunologic: Negative for environmental allergies, food allergies and immunocompromised state.   Neurological: Negative for dizziness, tremors, facial asymmetry, weakness, light-headedness, numbness and headaches.   Hematological: Negative for adenopathy. Does not bruise/bleed easily.   Psychiatric/Behavioral: Negative for agitation, behavioral problems, confusion, decreased concentration, dysphoric mood and sleep disturbance.        Objective      Visit Vitals   • /60 (BP Location: Right arm, Patient Position: Sitting, Cuff Size: Adult)   • Pulse 79   • Ht 66\" (167.6 cm)   • Wt 184 lb 6.4 oz (83.6 kg)   • BMI 29.76 kg/m2     Physical Exam   Constitutional: She is oriented to person, place, and time. She appears well-developed and well-nourished. No distress.   HENT:   Head: Normocephalic and atraumatic.   Right Ear: External ear normal.   Left Ear: External ear normal.   Nose: Nose normal.   Eyes: Conjunctivae and EOM are normal. Pupils are equal, round, and reactive to light.   Neck: Normal range of motion. Neck supple. No tracheal deviation present. No thyromegaly present.   Cardiovascular: Normal rate, regular rhythm and normal heart sounds.    No murmur heard.  Pulmonary/Chest: Effort normal and breath sounds normal. No respiratory distress. She has no wheezes.   Abdominal: Soft. Bowel sounds are normal. There is no tenderness. There is no rebound and no guarding.   Musculoskeletal: Normal range of motion. She exhibits no edema, tenderness or deformity.   Neurological: She is alert and oriented to person, place, and time. No cranial nerve deficit.   Skin: Skin is warm and dry. No rash noted.   Psychiatric: She has a normal mood and affect. Her behavior is normal. Judgment and thought content normal.       Lab Review  GLUCOSE   Date Value   02/24/2017 173 mg/dL (H)   11/16/2016 155 mg/dl (H)   07/20/2016 184 mg/dl (H)   05/03/2016 243 mg/dl (H)     SODIUM (mmol/L)   Date Value "   02/24/2017 137   11/16/2016 135.0   07/20/2016 132.0 (L)   05/03/2016 138.0     POTASSIUM (mmol/L)   Date Value   02/24/2017 4.3   11/16/2016 4.3   07/20/2016 4.6   05/03/2016 4.7     CHLORIDE (mmol/L)   Date Value   02/24/2017 100   11/16/2016 98.0 (L)   07/20/2016 95.0 (L)   05/03/2016 102.0     CO2 (mmol/L)   Date Value   02/24/2017 25.0   11/16/2016 27.0   07/20/2016 27.0   05/03/2016 27.0     BUN   Date Value   02/24/2017 15 mg/dL   11/16/2016 14 mg/dl   07/20/2016 19 mg/dl   05/03/2016 16 mg/dl     CREATININE   Date Value   02/24/2017 0.70 mg/dL   11/16/2016 0.7 mg/dl   07/20/2016 0.8 mg/dl   05/03/2016 0.8 mg/dl     HEMOGLOBIN A1C   Date Value   02/24/2017 7.29 % (H)   11/16/2016 7.3 %TotHgb (H)   07/20/2016 7.8 %TotHgb (H)   05/03/2016 8.3 %TotHgb (H)     TRIGLYCERIDES (mg/dl)   Date Value   07/20/2016 143   07/20/2016 147   01/04/2016 105       Assessment/Plan      1. Type 2 diabetes mellitus without complication, with long-term current use of insulin    2. Hashimoto's thyroiditis    3. Mixed hyperlipidemia    4. Vitamin D deficiency    .    Medications prescribed:  Outpatient Encounter Prescriptions as of 3/3/2017   Medication Sig Dispense Refill   • ALPRAZolam (XANAX) 0.25 MG tablet Take 1 tablet as needed for anxiety 90 tablet 0   • aspirin 81 MG tablet Take 81 mg by mouth daily.     • Calcium Carbonate (CALCIUM 600 PO) Take 2 tablets by mouth daily.     • DULoxetine (CYMBALTA) 60 MG capsule Take 1 capsule by mouth Daily. 90 capsule 1   • ergocalciferol (ERGOCALCIFEROL) 87374 UNITS capsule Take 1 capsule by mouth 1 (one) time per week. 12 capsule 3   • fluticasone (FLONASE) 50 MCG/ACT nasal spray 1 spray into each nostril 2 (two) times a day.     • gabapentin (NEURONTIN) 300 MG capsule Take 1 capsule by mouth 3 (Three) Times a Day. TAKE ONE CAPSULE EVERY MORNING AND 2 CAPSULES EVERY EVENING 270 capsule 1   • glucose blood (TRUETEST TEST) test strip Use to test four times daily 120 each 3   •  "HYDROcodone-acetaminophen (NORCO) 7.5-325 MG per tablet Take 1 tablet by mouth 2 (two) times a day.     • insulin aspart (novoLOG FLEXPEN) 100 UNIT/ML solution pen-injector sc pen Inject 2-8 Units under the skin 3 (Three) Times a Day With Meals. INJECT 2-8 UNITS THREE TIMES A DAY BEFORE MEALS 15 pen 3   • Insulin Glargine (LANTUS SOLOSTAR) 100 UNIT/ML injection pen Inject 15 Units under the skin every night. 9 mL 5   • levothyroxine (SYNTHROID, LEVOTHROID) 100 MCG tablet Take 1 tablet by mouth Daily. 90 tablet 1   • lisinopril (PRINIVIL,ZESTRIL) 20 MG tablet Take 20 mg by mouth daily.     • LORazepam (ATIVAN) 1 MG tablet Take 1 tablet by mouth At Night As Needed for anxiety or sleep. 90 tablet 0   • metFORMIN (GLUCOPHAGE) 850 MG tablet Take 1 tablet by mouth 2 (Two) Times a Day With Meals. 180 tablet 1   • Needle, Disp, 31G X 5/16\" misc 1 each 4 (Four) Times a Day. Pen Needle; 400 each 3   • NOVOLOG FLEXPEN 100 UNIT/ML solution pen-injector sc pen INJECT 2-8 UNITS SUBCUTANEOUSLY THREE TIMES A DAY BEFORE MEALS 15 mL 1   • sulfamethoxazole-trimethoprim (BACTRIM DS) 800-160 MG per tablet Take 1 tablet by mouth 2 (two) times a day. 20 tablet 0   • TRUEPLUS LANCETS 33G misc 1 each 4 (four) times a day. 4 each 0   • [DISCONTINUED] insulin aspart (NOVOLOG FLEXPEN) 100 UNIT/ML solution pen-injector sc pen Inject 2-8 Units under the skin 3 (three) times a day with meals. INJECT 2-8 UNITS THREE TIMES A DAY BEFORE MEALS 8 package 2   • [DISCONTINUED] Needle, Disp, 31G X 5/16\" misc 1 each 4 (Four) Times a Day. Pen Needle; 400 each 1   • [DISCONTINUED] Albuterol Sulfate (VENTOLIN HFA IN) Inhale 2 puffs every 4 (four) hours as needed. Ventolin HFA 90 mcg     • [DISCONTINUED] cefdinir (OMNICEF) 300 MG capsule      • [DISCONTINUED] loratadine (CLARITIN) 10 MG tablet 1 TABLET(S) BY MOUTH DAILY 30 tablet 5   • [DISCONTINUED] nitrofurantoin, macrocrystal-monohydrate, (MACROBID) 100 MG capsule Take 1 capsule by mouth 2 (Two) Times a " Day. 20 capsule 0   • [DISCONTINUED] simvastatin (ZOCOR) 20 MG tablet Take 20 mg by mouth every night.     • [DISCONTINUED] traZODone (DESYREL) 50 MG tablet Take 1 tablet by mouth every night. 1-2 tablets at bedtime 180 tablet 0   • [DISCONTINUED] traZODone (DESYREL) 50 MG tablet TAKE 1 TO 2 TABLETS NIGHTLYAT BEDTIME 180 tablet 0     No facility-administered encounter medications on file as of 3/3/2017.        Orders placed during this encounter include:  Orders Placed This Encounter   Procedures   • Comprehensive Metabolic Panel   • Hemoglobin A1c   • TSH   • Vitamin D 25 Hydroxy           Glycemic Management       Novolog mix stopped   Januvia stopped it due to headaches  Glimepiride stopped caused higher sugars         Metformin 850 mg one tablet po BID         Lantus 15 units      Novolog for meals      Breakfast -- 6      Lunch -- 7      Supper -- 8                Lipid Management      Simvastatin 20 mg one at bedtime      Lipid at goal         Blood Pressure Management      Lisinopril 20 mg daily     Microvascular Complication Monitoring      Cymbalta 60 mg one daily      Gabapentin 300 mg one TID        hydrocodone-acetaminophen 7.5- 500 mg 2 times daily      3- 15 Microalbuminuria            Bone Health      h o vitamin d def      vitamin d 50,000 units weekly     Feb. 2017     Vitamin d - nl         Immunization: influenza vaccine Oct. 2016 , shingles vaccine Oct. 2016         Other Diabetes Related Aspects         Hypothyroidism            Levothyroxine 100 mcg one daily Monday through Friday and 1/2 tablet on Saturday and none on Sunday Nov. 2016 TSH - nl    Feb. TSH - nl          4. Follow-up: Return in about 4 months (around 7/3/2017) for Recheck.

## 2017-03-13 RX ORDER — LISINOPRIL 20 MG/1
TABLET ORAL
Qty: 90 TABLET | Refills: 3 | Status: SHIPPED | OUTPATIENT
Start: 2017-03-13 | End: 2017-12-07 | Stop reason: SDUPTHER

## 2017-03-14 ENCOUNTER — LAB (OUTPATIENT)
Dept: LAB | Facility: OTHER | Age: 82
End: 2017-03-14

## 2017-03-14 DIAGNOSIS — R30.0 DYSURIA: ICD-10-CM

## 2017-03-14 DIAGNOSIS — R30.0 DYSURIA: Primary | ICD-10-CM

## 2017-03-14 LAB
BACTERIA UR QL AUTO: ABNORMAL /HPF
BILIRUB UR QL STRIP: NEGATIVE
CLARITY UR: ABNORMAL
COLOR UR: YELLOW
GLUCOSE UR STRIP-MCNC: NEGATIVE MG/DL
HGB UR QL STRIP.AUTO: ABNORMAL
HYALINE CASTS UR QL AUTO: ABNORMAL /LPF
KETONES UR QL STRIP: NEGATIVE
LEUKOCYTE ESTERASE UR QL STRIP.AUTO: ABNORMAL
NITRITE UR QL STRIP: NEGATIVE
PH UR STRIP.AUTO: 8 [PH] (ref 5.5–8)
PROT UR QL STRIP: ABNORMAL
RBC # UR: ABNORMAL /HPF
REF LAB TEST METHOD: ABNORMAL
SP GR UR STRIP: 1.02 (ref 1–1.03)
SQUAMOUS #/AREA URNS HPF: ABNORMAL /HPF
UROBILINOGEN UR QL STRIP: ABNORMAL
WBC UR QL AUTO: ABNORMAL /HPF

## 2017-03-14 PROCEDURE — 87186 SC STD MICRODIL/AGAR DIL: CPT | Performed by: FAMILY MEDICINE

## 2017-03-14 PROCEDURE — 87086 URINE CULTURE/COLONY COUNT: CPT | Performed by: FAMILY MEDICINE

## 2017-03-14 PROCEDURE — 87077 CULTURE AEROBIC IDENTIFY: CPT | Performed by: FAMILY MEDICINE

## 2017-03-14 PROCEDURE — 81001 URINALYSIS AUTO W/SCOPE: CPT | Performed by: FAMILY MEDICINE

## 2017-03-14 NOTE — PROGRESS NOTES
Please call the patient regarding her abnormal result.  Tell her she does have a UTI and send in Bactrim DS twice a day for 10 days

## 2017-03-15 RX ORDER — SULFAMETHOXAZOLE AND TRIMETHOPRIM 800; 160 MG/1; MG/1
1 TABLET ORAL 2 TIMES DAILY
Qty: 20 TABLET | Refills: 0 | Status: SHIPPED | OUTPATIENT
Start: 2017-03-15 | End: 2017-10-05

## 2017-03-17 LAB
BACTERIA SPEC AEROBE CULT: ABNORMAL
BETA LACTAMASE: ABNORMAL

## 2017-03-21 NOTE — PROGRESS NOTES
Please call the patient regarding her abnormal result.  Please contact the patient, stop the Bactrim and send  nitrofurantoin 100 mg twice a day 10 days

## 2017-03-22 RX ORDER — NITROFURANTOIN 25; 75 MG/1; MG/1
100 CAPSULE ORAL 2 TIMES DAILY
Qty: 20 CAPSULE | Refills: 0 | Status: SHIPPED | OUTPATIENT
Start: 2017-03-22 | End: 2017-10-05

## 2017-04-10 ENCOUNTER — OFFICE VISIT (OUTPATIENT)
Dept: FAMILY MEDICINE CLINIC | Facility: CLINIC | Age: 82
End: 2017-04-10

## 2017-04-10 VITALS
SYSTOLIC BLOOD PRESSURE: 120 MMHG | BODY MASS INDEX: 29.57 KG/M2 | HEIGHT: 66 IN | WEIGHT: 184 LBS | DIASTOLIC BLOOD PRESSURE: 68 MMHG

## 2017-04-10 DIAGNOSIS — F41.9 ANXIETY: Primary | ICD-10-CM

## 2017-04-10 DIAGNOSIS — J30.9 ALLERGIC RHINITIS, UNSPECIFIED ALLERGIC RHINITIS TRIGGER, UNSPECIFIED RHINITIS SEASONALITY: ICD-10-CM

## 2017-04-10 PROCEDURE — 99214 OFFICE O/P EST MOD 30 MIN: CPT | Performed by: FAMILY MEDICINE

## 2017-04-10 RX ORDER — ALPRAZOLAM 0.25 MG/1
TABLET ORAL
Qty: 90 TABLET | Refills: 0 | Status: CANCELLED | OUTPATIENT
Start: 2017-04-10

## 2017-04-10 RX ORDER — LORAZEPAM 1 MG/1
1 TABLET ORAL NIGHTLY PRN
Qty: 30 TABLET | Refills: 2 | Status: SHIPPED | OUTPATIENT
Start: 2017-04-10 | End: 2017-07-05 | Stop reason: SDUPTHER

## 2017-04-10 RX ORDER — LORATADINE 10 MG/1
10 TABLET ORAL DAILY
Qty: 30 TABLET | Refills: 5 | Status: SHIPPED | OUTPATIENT
Start: 2017-04-10 | End: 2017-10-05

## 2017-04-10 NOTE — PROGRESS NOTES
Subjective   Nyla Piña is a 81 y.o. female.  She is here today for follow-up on anxiety.  She's had difficulty sleeping especially at night and gets anxious since her  passed away and take some lorazepam occasionally.  Also recently returned from a trip and has a lot of upper respiratory congestion with cough and sneezing and drainage.  She's had no fever    History of Present Illness   Anxiety   Presents for follow-up visit. Symptoms include decreased concentration, depressed mood, excessive worry, irritability, muscle tension, nervous/anxious behavior, palpitations, panic and restlessness. Patient reports no chest pain, compulsions, confusion, dizziness, dry mouth, feeling of choking, hyperventilation, impotence, insomnia, malaise, nausea, obsessions, shortness of breath or suicidal ideas. Symptoms occur most days. The severity of symptoms is severe and causing significant distress. The quality of sleep is fair. Nighttime awakenings: occasional.     Compliance with medications is %.     The following portions of the patient's history were reviewed and updated as appropriate: allergies, current medications, past family history, past medical history, past social history, past surgical history and problem list.    Review of Systems   Constitutional: Negative.    HENT: Positive for congestion, postnasal drip, rhinorrhea, sinus pressure and sore throat.    Respiratory: Positive for cough. Negative for shortness of breath.    Cardiovascular: Negative.  Negative for chest pain.   Gastrointestinal: Negative.    Musculoskeletal: Negative.  Negative for myalgias.   Skin: Negative.    Allergic/Immunologic: Negative for immunocompromised state.   Neurological: Negative for dizziness, tremors, seizures, syncope, weakness and numbness.   Hematological: Negative.    Psychiatric/Behavioral: Negative for agitation, confusion, dysphoric mood and sleep disturbance. The patient is not nervous/anxious.         Objective   Physical Exam   Constitutional: She is oriented to person, place, and time. She appears well-developed and well-nourished.   HENT:   Head: Normocephalic and atraumatic.   Nose: Nose normal.   Mouth/Throat: Oropharynx is clear and moist.   Nasal mucosa pale and boggy   Eyes: Conjunctivae and EOM are normal. Pupils are equal, round, and reactive to light.   Neck: Normal range of motion. Neck supple. No JVD present. No tracheal deviation present. No thyromegaly present.   Cardiovascular: Normal rate, regular rhythm, normal heart sounds and intact distal pulses.    No murmur heard.  Pulmonary/Chest: Effort normal and breath sounds normal. She has no wheezes.   Abdominal: Soft. Bowel sounds are normal. She exhibits no distension. There is no tenderness.   Musculoskeletal: She exhibits no edema.   Lymphadenopathy:     She has no cervical adenopathy.   Neurological: She is alert and oriented to person, place, and time. Coordination normal.   Skin: Skin is warm and dry. No rash noted.   Psychiatric: She has a normal mood and affect.   Nursing note and vitals reviewed.      Assessment/Plan   Nyla was seen today for med refill.    Diagnoses and all orders for this visit:    Anxiety    Allergic rhinitis, unspecified allergic rhinitis trigger, unspecified rhinitis seasonality    Other orders  -     Cancel: ALPRAZolam (XANAX) 0.25 MG tablet; Take 1 tablet as needed for anxiety  -     LORazepam (ATIVAN) 1 MG tablet; Take 1 tablet by mouth At Night As Needed for Anxiety or Sleep.  -     loratadine (CLARITIN) 10 MG tablet; Take 1 tablet by mouth Daily.  The patient has read and signed the [facility] Controlled Substance Contract.  I will continue to see patient for regular follow up appointments. Patient is well controlled on the medication.  ANGELITO has been reviewed by me and is updated every 3 months. The patient is aware of the potential for addiction and dependence.   Claritin given for allergy  symptoms, contact me if not improving in 3-5 days.    Continue prn lorazepam at bedtime.

## 2017-05-10 RX ORDER — SIMVASTATIN 20 MG
TABLET ORAL
Qty: 90 TABLET | Refills: 3 | Status: SHIPPED | OUTPATIENT
Start: 2017-05-10 | End: 2018-05-14

## 2017-05-22 RX ORDER — GABAPENTIN 300 MG/1
CAPSULE ORAL
Qty: 270 CAPSULE | Refills: 0 | Status: SHIPPED | OUTPATIENT
Start: 2017-05-22 | End: 2017-09-14 | Stop reason: SDUPTHER

## 2017-05-22 RX ORDER — TRAZODONE HYDROCHLORIDE 50 MG/1
TABLET ORAL
Qty: 180 TABLET | Refills: 0 | Status: SHIPPED | OUTPATIENT
Start: 2017-05-22 | End: 2017-08-09 | Stop reason: SDUPTHER

## 2017-06-12 RX ORDER — LORAZEPAM 1 MG/1
TABLET ORAL
Qty: 30 TABLET | Refills: 2 | OUTPATIENT
Start: 2017-06-12

## 2017-07-05 ENCOUNTER — LAB (OUTPATIENT)
Dept: LAB | Facility: OTHER | Age: 82
End: 2017-07-05

## 2017-07-05 ENCOUNTER — OFFICE VISIT (OUTPATIENT)
Dept: FAMILY MEDICINE CLINIC | Facility: CLINIC | Age: 82
End: 2017-07-05

## 2017-07-05 VITALS
HEART RATE: 72 BPM | BODY MASS INDEX: 30.37 KG/M2 | HEIGHT: 66 IN | TEMPERATURE: 97.4 F | SYSTOLIC BLOOD PRESSURE: 122 MMHG | WEIGHT: 189 LBS | DIASTOLIC BLOOD PRESSURE: 72 MMHG

## 2017-07-05 DIAGNOSIS — Z79.899 ENCOUNTER FOR LONG-TERM (CURRENT) DRUG USE: ICD-10-CM

## 2017-07-05 DIAGNOSIS — R30.0 DYSURIA: ICD-10-CM

## 2017-07-05 DIAGNOSIS — Z79.899 ENCOUNTER FOR LONG-TERM (CURRENT) DRUG USE: Primary | ICD-10-CM

## 2017-07-05 DIAGNOSIS — N39.0 URINARY TRACT INFECTION WITHOUT HEMATURIA, SITE UNSPECIFIED: Primary | ICD-10-CM

## 2017-07-05 DIAGNOSIS — G47.00 INSOMNIA, UNSPECIFIED TYPE: ICD-10-CM

## 2017-07-05 LAB
BACTERIA UR QL AUTO: ABNORMAL /HPF
BILIRUB UR QL STRIP: NEGATIVE
CLARITY UR: ABNORMAL
COLOR UR: YELLOW
GLUCOSE UR STRIP-MCNC: NEGATIVE MG/DL
HGB UR QL STRIP.AUTO: ABNORMAL
HYALINE CASTS UR QL AUTO: ABNORMAL /LPF
KETONES UR QL STRIP: NEGATIVE
LEUKOCYTE ESTERASE UR QL STRIP.AUTO: ABNORMAL
NITRITE UR QL STRIP: POSITIVE
PH UR STRIP.AUTO: 6 [PH] (ref 5.5–8)
PROT UR QL STRIP: ABNORMAL
RBC # UR: ABNORMAL /HPF
REF LAB TEST METHOD: ABNORMAL
SP GR UR STRIP: 1.02 (ref 1–1.03)
SQUAMOUS #/AREA URNS HPF: ABNORMAL /HPF
UROBILINOGEN UR QL STRIP: ABNORMAL
WBC UR QL AUTO: ABNORMAL /HPF

## 2017-07-05 PROCEDURE — G0480 DRUG TEST DEF 1-7 CLASSES: HCPCS

## 2017-07-05 PROCEDURE — 87077 CULTURE AEROBIC IDENTIFY: CPT | Performed by: FAMILY MEDICINE

## 2017-07-05 PROCEDURE — 36415 COLL VENOUS BLD VENIPUNCTURE: CPT | Performed by: FAMILY MEDICINE

## 2017-07-05 PROCEDURE — 99214 OFFICE O/P EST MOD 30 MIN: CPT | Performed by: FAMILY MEDICINE

## 2017-07-05 PROCEDURE — G0480 DRUG TEST DEF 1-7 CLASSES: HCPCS | Performed by: FAMILY MEDICINE

## 2017-07-05 PROCEDURE — 80171 DRUG SCREEN QUANT GABAPENTIN: CPT | Performed by: FAMILY MEDICINE

## 2017-07-05 PROCEDURE — 87186 SC STD MICRODIL/AGAR DIL: CPT | Performed by: FAMILY MEDICINE

## 2017-07-05 PROCEDURE — 81001 URINALYSIS AUTO W/SCOPE: CPT | Performed by: FAMILY MEDICINE

## 2017-07-05 PROCEDURE — 87086 URINE CULTURE/COLONY COUNT: CPT | Performed by: FAMILY MEDICINE

## 2017-07-05 PROCEDURE — 80307 DRUG TEST PRSMV CHEM ANLYZR: CPT | Performed by: FAMILY MEDICINE

## 2017-07-05 RX ORDER — LORAZEPAM 1 MG/1
1 TABLET ORAL NIGHTLY PRN
Qty: 30 TABLET | Refills: 2 | Status: SHIPPED | OUTPATIENT
Start: 2017-07-05 | End: 2017-10-05 | Stop reason: SDUPTHER

## 2017-07-05 RX ORDER — ERGOCALCIFEROL 1.25 MG/1
50000 CAPSULE ORAL WEEKLY
Qty: 12 CAPSULE | Refills: 3 | Status: CANCELLED | OUTPATIENT
Start: 2017-07-05

## 2017-07-05 RX ORDER — ERGOCALCIFEROL 1.25 MG/1
50000 CAPSULE ORAL WEEKLY
Qty: 12 CAPSULE | Refills: 3 | Status: SHIPPED | OUTPATIENT
Start: 2017-07-05 | End: 2018-07-25 | Stop reason: SDUPTHER

## 2017-07-05 RX ORDER — NITROFURANTOIN MACROCRYSTALS 100 MG/1
CAPSULE ORAL
Qty: 20 CAPSULE | Refills: 0 | Status: SHIPPED | OUTPATIENT
Start: 2017-07-05 | End: 2019-02-27

## 2017-07-05 RX ORDER — PEN NEEDLE, DIABETIC 31 GX5/16"
NEEDLE, DISPOSABLE MISCELLANEOUS
COMMUNITY
Start: 2017-05-26 | End: 2018-03-19 | Stop reason: SDUPTHER

## 2017-07-05 NOTE — PROGRESS NOTES
Subjective   Nyla Piña is a 82 y.o. female.  She's here for follow-up on anxiety and difficulty sleeping.  It started when her  passed.  She feels very lonely but denies overt depression.  She feels very tired in the daytime but sometimes that she doesn't go to sleep until 2:30 in the morning and sleeps till noon.    She's having symptoms again of urinary tract infection with burning frequency and urgency.    Urinary Tract Infection    This is a recurrent problem. The current episode started 1 to 4 weeks ago. The problem occurs every urination. The problem has been unchanged. The quality of the pain is described as burning and aching. The pain is at a severity of 6/10. The pain is moderate. There has been no fever. She is not sexually active. There is no history of pyelonephritis. Associated symptoms include frequency and urgency. Pertinent negatives include no chills, discharge, flank pain, hematuria, hesitancy, nausea, possible pregnancy, sweats or vomiting. She has tried antibiotics and increased fluids for the symptoms. The treatment provided mild relief.        The following portions of the patient's history were reviewed and updated as appropriate: allergies, current medications, past family history, past medical history, past social history, past surgical history and problem list.    Review of Systems   Constitutional: Negative for chills.   HENT: Negative.    Respiratory: Negative.  Negative for shortness of breath.    Cardiovascular: Negative.    Gastrointestinal: Negative.  Negative for nausea and vomiting.   Genitourinary: Positive for frequency and urgency. Negative for flank pain, hematuria and hesitancy.   Musculoskeletal: Negative.    Skin: Negative.    Allergic/Immunologic: Negative for immunocompromised state.   Neurological: Negative for dizziness, tremors, seizures and syncope.   Hematological: Negative.    Psychiatric/Behavioral: Positive for sleep disturbance. Negative for  agitation, confusion and dysphoric mood. The patient is not nervous/anxious.        Objective   Physical Exam   Constitutional: She is oriented to person, place, and time. She appears well-developed and well-nourished.   HENT:   Head: Normocephalic and atraumatic.   Nose: Nose normal.   Mouth/Throat: Oropharynx is clear and moist.   Eyes: Conjunctivae and EOM are normal. Pupils are equal, round, and reactive to light.   Neck: Normal range of motion. Neck supple. No JVD present. No tracheal deviation present. No thyromegaly present.   Cardiovascular: Normal rate, regular rhythm, normal heart sounds and intact distal pulses.    No murmur heard.  Pulmonary/Chest: Effort normal and breath sounds normal. She has no wheezes.   Abdominal: Soft. Bowel sounds are normal. She exhibits no distension. There is no tenderness.   Musculoskeletal: Normal range of motion. She exhibits no edema.   Lymphadenopathy:     She has no cervical adenopathy.   Neurological: She is alert and oriented to person, place, and time. Coordination normal.   Skin: Skin is warm and dry. No rash noted.   Psychiatric: She has a normal mood and affect.   Nursing note and vitals reviewed.       Ref Range & Units 8:40 AM     RBC, UA None Seen /HPF 6-12 (A)   WBC, UA None Seen /HPF Too Numerous to Count (A)   Bacteria, UA None Seen /HPF 1+ (A)   Squamous Epithelial Cells, UA None Seen, 0-2 /HPF 0-2   Hyaline Casts, UA None Seen /LPF None Seen   Methodology  Manual Light Microscopy   Resulting Agency  [facility]      Specimen Collected: 07/05/17  8:40 AM Last Resulted: 07/05/17  8:54 AM                          Urinalysis With / Culture If Indicated   Order: [id number]   Status:  Final result   Visible to patient:  No (Not Released) Dx:  Dysuria      Ref Range & Units 8:40 AM     Color, UA Yellow, Straw Yellow   Appearance, UA Clear Turbid (A)   pH, UA 5.5 - 8.0 6.0   Specific Gravity, UA 1.005 - 1.030 1.020   Glucose, UA Negative Negative   Ketones, UA  Negative Negative   Bilirubin, UA Negative Negative   Blood, UA Negative Moderate (2+) (A)   Protein, UA Negative 100 mg/dL (2+) (A)   Leuk Esterase, UA Negative Large (3+) (A)   Nitrite, UA Negative Positive (A)   Urobilinogen, UA 0.2 - 1.0 E.U./dL 0.2 E.U./dL   [place]  [facility]      Specimen Collected: 07/05/17  8:40 AM Last Resulted: 07/05/17  8:54 AM                  Assessment/Plan   Nyla was seen today for urinary tract infection, fatigue and med refill.    Diagnoses and all orders for this visit:    Urinary tract infection without hematuria, site unspecified    Insomnia, unspecified type    Other orders  -     Cancel: ergocalciferol (ERGOCALCIFEROL) 88003 UNITS capsule; Take 1 capsule by mouth 1 (One) Time Per Week.  -     LORazepam (ATIVAN) 1 MG tablet; Take 1 tablet by mouth At Night As Needed for Anxiety or Sleep.  -     ergocalciferol (ERGOCALCIFEROL) 48547 UNITS capsule; Take 1 capsule by mouth 1 (One) Time Per Week.  -     nitrofurantoin (MACRODANTIN) 100 MG capsule; 1 capsule twice a day    The patient has read and signed the [facility] Controlled Substance Contract.  I will continue to see patient for regular follow up appointments. Patient is well controlled on the medication.  ANGELITO has been reviewed by me and is updated every 3 months. The patient is aware of the potential for addiction and dependence.   We'll continue lorazepam as it helps with her anxiety and sleeplessness at night, recheck in 3 months  We'll give Macrodantin for the urinary tract infection.  Her last culture and sensitivity showed that this should be effective.  She is allergic to penicillin and is not sure whether or not she can take cephalosporins, to which the infection also shows sensitivity.

## 2017-07-08 LAB
BACTERIA SPEC AEROBE CULT: ABNORMAL
GABAPENTIN SERPLBLD-MCNC: 7 UG/ML (ref 4–16)

## 2017-07-10 NOTE — PROGRESS NOTES
Please call the patient regarding her abnormal result.  I think she is on Bactrim and this is resistant.  Send in Ceftin 500 mg BID X 10 days. She is allergic to PCN but has had cephalosporins before.

## 2017-07-11 RX ORDER — CEFUROXIME AXETIL 500 MG/1
500 TABLET ORAL 2 TIMES DAILY
Qty: 20 TABLET | Refills: 0 | Status: SHIPPED | OUTPATIENT
Start: 2017-07-11 | End: 2017-08-29

## 2017-07-14 LAB
6MAM SERPLBLD-MCNC: NEGATIVE NG/ML
AMPHETAMINES SERPL QL SCN: NEGATIVE NG/ML
BARBITURATES SERPLBLD QL: NEGATIVE UG/ML
BENZODIAZ SERPL QL: NEGATIVE NG/ML
BZE BLD QL SCN: NEGATIVE NG/ML
CODEINE UR QL: NEGATIVE NG/ML
DHC BLD-MCNC: 1.3 NG/ML
HYDROCODONE SERPL-MCNC: 9.6 NG/ML
HYDROMORPHONE SERPL-MCNC: NEGATIVE NG/ML
METHADONE UR QL: NEGATIVE NG/ML
MORPHINE SERPLBLD-MCNC: NEGATIVE NG/ML
OPIATES SERPL QL SCN: ABNORMAL NG/ML
OPIATES SPEC QL: POSITIVE
OXYCODONE SERPL-MCNC: NEGATIVE NG/ML
OXYCODONE SERPLBLD CFM-MCNC: NEGATIVE NG/ML
OXYCODONE+OXYMORPHONE SERPLBLD CFM-IMP: NEGATIVE
OXYMORPHONE SERPLBLD CFM-MCNC: NEGATIVE NG/ML
PCP SPEC-MCNC: NEGATIVE NG/ML
PROPOXYPH SPEC QL: NEGATIVE NG/ML
THC SERPLBLD CFM-MCNC: NEGATIVE NG/ML

## 2017-07-18 LAB — ZOLPIDEM: NORMAL NG/ML

## 2017-07-18 RX ORDER — CALCIUM CITRATE/VITAMIN D3 200MG-6.25
TABLET ORAL
Qty: 150 EACH | Refills: 11 | Status: SHIPPED | OUTPATIENT
Start: 2017-07-18 | End: 2017-09-25 | Stop reason: SDUPTHER

## 2017-07-19 LAB
ALBUMIN SERPL-MCNC: 4.1 G/DL (ref 3.2–5.5)
ALBUMIN/GLOB SERPL: 1.2 G/DL (ref 1–3)
ALP SERPL-CCNC: 67 U/L (ref 15–121)
ALT SERPL W P-5'-P-CCNC: 13 U/L (ref 10–60)
ANION GAP SERPL CALCULATED.3IONS-SCNC: 12 MMOL/L (ref 5–15)
AST SERPL-CCNC: 18 U/L (ref 10–60)
BASOPHILS # BLD AUTO: 0.03 10*3/MM3 (ref 0–0.2)
BASOPHILS NFR BLD AUTO: 0.3 % (ref 0–2)
BILIRUB SERPL-MCNC: 0.7 MG/DL (ref 0.2–1)
BUN BLD-MCNC: 12 MG/DL (ref 8–25)
BUN/CREAT SERPL: 17.1 (ref 7–25)
CALCIUM SPEC-SCNC: 10 MG/DL (ref 8.4–10.8)
CHLORIDE SERPL-SCNC: 96 MMOL/L (ref 100–112)
CO2 SERPL-SCNC: 25 MMOL/L (ref 20–32)
CREAT BLD-MCNC: 0.7 MG/DL (ref 0.4–1.3)
DEPRECATED RDW RBC AUTO: 42.5 FL (ref 36.4–46.3)
EOSINOPHIL # BLD AUTO: 0.36 10*3/MM3 (ref 0–0.7)
EOSINOPHIL NFR BLD AUTO: 4 % (ref 0–7)
ERYTHROCYTE [DISTWIDTH] IN BLOOD BY AUTOMATED COUNT: 13 % (ref 11.5–14.5)
GFR SERPL CREATININE-BSD FRML MDRD: 81 ML/MIN/1.73 (ref 39–90)
GLOBULIN UR ELPH-MCNC: 3.5 GM/DL (ref 2.5–4.6)
GLUCOSE BLD-MCNC: 186 MG/DL (ref 70–100)
HCT VFR BLD AUTO: 38.6 % (ref 35–45)
HGB BLD-MCNC: 12.9 G/DL (ref 12–15.5)
LYMPHOCYTES # BLD AUTO: 3.85 10*3/MM3 (ref 0.6–4.2)
LYMPHOCYTES NFR BLD AUTO: 43 % (ref 10–50)
MCH RBC QN AUTO: 30.6 PG (ref 26.5–34)
MCHC RBC AUTO-ENTMCNC: 33.4 G/DL (ref 31.4–36)
MCV RBC AUTO: 91.7 FL (ref 80–98)
MONOCYTES # BLD AUTO: 0.69 10*3/MM3 (ref 0–0.9)
MONOCYTES NFR BLD AUTO: 7.7 % (ref 0–12)
NEUTROPHILS # BLD AUTO: 4.02 10*3/MM3 (ref 2–8.6)
NEUTROPHILS NFR BLD AUTO: 45 % (ref 37–80)
PLATELET # BLD AUTO: 322 10*3/MM3 (ref 150–450)
PMV BLD AUTO: 8.8 FL (ref 8–12)
POTASSIUM BLD-SCNC: 4.4 MMOL/L (ref 3.4–5.4)
PROT SERPL-MCNC: 7.6 G/DL (ref 6.7–8.2)
RBC # BLD AUTO: 4.21 10*6/MM3 (ref 3.77–5.16)
SODIUM BLD-SCNC: 133 MMOL/L (ref 134–146)
WBC NRBC COR # BLD: 8.95 10*3/MM3 (ref 3.2–9.8)

## 2017-07-19 PROCEDURE — 36415 COLL VENOUS BLD VENIPUNCTURE: CPT | Performed by: NURSE PRACTITIONER

## 2017-07-19 PROCEDURE — 83036 HEMOGLOBIN GLYCOSYLATED A1C: CPT | Performed by: NURSE PRACTITIONER

## 2017-07-19 PROCEDURE — 82306 VITAMIN D 25 HYDROXY: CPT | Performed by: NURSE PRACTITIONER

## 2017-07-19 PROCEDURE — 80053 COMPREHEN METABOLIC PANEL: CPT | Performed by: NURSE PRACTITIONER

## 2017-07-19 PROCEDURE — 85025 COMPLETE CBC W/AUTO DIFF WBC: CPT | Performed by: NURSE PRACTITIONER

## 2017-07-19 PROCEDURE — 84443 ASSAY THYROID STIM HORMONE: CPT | Performed by: NURSE PRACTITIONER

## 2017-07-20 LAB
25(OH)D3 SERPL-MCNC: 46.7 NG/ML (ref 30–100)
HBA1C MFR BLD: 7.2 % (ref 4–5.6)
TSH SERPL DL<=0.05 MIU/L-ACNC: 1.67 MIU/ML (ref 0.46–4.68)

## 2017-07-24 ENCOUNTER — TELEPHONE (OUTPATIENT)
Dept: ENDOCRINOLOGY | Facility: CLINIC | Age: 82
End: 2017-07-24

## 2017-07-24 ENCOUNTER — OFFICE VISIT (OUTPATIENT)
Dept: ENDOCRINOLOGY | Facility: CLINIC | Age: 82
End: 2017-07-24

## 2017-07-24 VITALS
HEART RATE: 91 BPM | HEIGHT: 66 IN | SYSTOLIC BLOOD PRESSURE: 140 MMHG | BODY MASS INDEX: 29.68 KG/M2 | WEIGHT: 184.7 LBS | DIASTOLIC BLOOD PRESSURE: 70 MMHG | OXYGEN SATURATION: 96 %

## 2017-07-24 DIAGNOSIS — I10 ESSENTIAL HYPERTENSION: ICD-10-CM

## 2017-07-24 DIAGNOSIS — E06.3 HASHIMOTO'S THYROIDITIS: ICD-10-CM

## 2017-07-24 DIAGNOSIS — E55.9 VITAMIN D DEFICIENCY: ICD-10-CM

## 2017-07-24 DIAGNOSIS — Z79.4 TYPE 2 DIABETES MELLITUS WITHOUT COMPLICATION, WITH LONG-TERM CURRENT USE OF INSULIN (HCC): Primary | ICD-10-CM

## 2017-07-24 DIAGNOSIS — R30.0 DYSURIA: ICD-10-CM

## 2017-07-24 DIAGNOSIS — E78.2 MIXED HYPERLIPIDEMIA: ICD-10-CM

## 2017-07-24 DIAGNOSIS — E11.9 TYPE 2 DIABETES MELLITUS WITHOUT COMPLICATION, WITH LONG-TERM CURRENT USE OF INSULIN (HCC): Primary | ICD-10-CM

## 2017-07-24 LAB
BACTERIA UR QL AUTO: ABNORMAL /HPF
BILIRUB UR QL STRIP: NEGATIVE
CLARITY UR: ABNORMAL
COLOR UR: YELLOW
GLUCOSE UR STRIP-MCNC: ABNORMAL MG/DL
HGB UR QL STRIP.AUTO: ABNORMAL
HYALINE CASTS UR QL AUTO: ABNORMAL /LPF
KETONES UR QL STRIP: NEGATIVE
LEUKOCYTE ESTERASE UR QL STRIP.AUTO: ABNORMAL
NITRITE UR QL STRIP: POSITIVE
PH UR STRIP.AUTO: 5.5 [PH] (ref 5.5–8)
PROT UR QL STRIP: ABNORMAL
RBC # UR: ABNORMAL /HPF
REF LAB TEST METHOD: ABNORMAL
SP GR UR STRIP: >=1.03 (ref 1–1.03)
SQUAMOUS #/AREA URNS HPF: ABNORMAL /HPF
UROBILINOGEN UR QL STRIP: ABNORMAL
WBC UR QL AUTO: ABNORMAL /HPF

## 2017-07-24 PROCEDURE — 87077 CULTURE AEROBIC IDENTIFY: CPT | Performed by: NURSE PRACTITIONER

## 2017-07-24 PROCEDURE — 87186 SC STD MICRODIL/AGAR DIL: CPT | Performed by: NURSE PRACTITIONER

## 2017-07-24 PROCEDURE — 87086 URINE CULTURE/COLONY COUNT: CPT | Performed by: NURSE PRACTITIONER

## 2017-07-24 PROCEDURE — 81001 URINALYSIS AUTO W/SCOPE: CPT | Performed by: NURSE PRACTITIONER

## 2017-07-24 PROCEDURE — 99214 OFFICE O/P EST MOD 30 MIN: CPT | Performed by: NURSE PRACTITIONER

## 2017-07-24 NOTE — TELEPHONE ENCOUNTER
----- Message from MARTHA Gonzalez sent at 7/24/2017 12:06 PM CDT -----  Call patient with result-- let her know she has an UTI- call in nitrofurantion 100 mg BID for 10 days; the culture will be back in a couple of days; she needs to make an appt with Dr. Long to follow up UTI

## 2017-07-24 NOTE — PROGRESS NOTES
Subjective    Nyla Piña is a 82 y.o. female. she is here today for follow-up.    History of Present Illness     Duration/Timing:Diabetes mellitus type 2, Age at onset of diabetes : 58 years, Onset of symptoms gradual         Timing constant      quality near control      Severity Complications        Severity (Complications/Hospitalizations)  Secondary Macrovascular Complications: No CAD, No CVA, No PAD  Secondary Microvascular Complications: No Diabetic Nephropathy, Microalbuminuria, No proteinuria, No Diabetic Retinopathy, Diabetic Neuropathy     Context  Diabetes Regimen: Insulin, oral medication, Last HgbA1c 7.2 from Feb. 2017      Lab Results   Component Value Date    HGBA1C 7.20 (H) 07/19/2017       Blood Glucose Readings      Several at goal      Lowest 85      Diet      Low carb      Exercise:Does not exercise     Associated Signs/Symptoms  Hyperglycemic Symptoms:Polyruria, polydipsia, polyphagia, No blurred vision, No weight gain  Hypoglycemic Episodes:No documented hypoglycemia                 The following portions of the patient's history were reviewed and updated as appropriate:   Past Medical History:   Diagnosis Date   • Acute bronchitis    • Acute sinusitis    • Acute upper respiratory infection    • Anxiety    • Cellulitis of lower leg    • Chronic urinary tract infection    • Cough    • Death of     • Diabetic asymmetric polyneuropathy    • Diabetic peripheral neuropathy    • Diabetic polyneuropathy    • Disease of nail    • Dorsalgia    • Flank pain    • Hashimoto's thyroiditis    • History of echocardiogram 02/19/2013   • Hypertensive disorder    • Insomnia    • Mixed hyperlipidemia    • Non-healing surgical wound    • Osteoarthritis of multiple joints    • Peripheral vascular disease    • Seasonal allergic rhinitis    • Type II diabetes mellitus, uncontrolled    • Upper respiratory infection    • Urinary tract infectious disease    • Urinary tract infectious disease    • Vitamin  "D deficiency      Past Surgical History:   Procedure Laterality Date   • EXCISION MASS LEG Left 11/05/2013    Excision of soft tissue mass of left leg   • STEROID INJECTION  02/08/2016    Depo Medrol (Methylprednisone) 80mg (Acute upper respiratory infection, unspecified)      History reviewed. No pertinent family history.  OB History     No data available        Current Outpatient Prescriptions   Medication Sig Dispense Refill   • aspirin 81 MG tablet Take 81 mg by mouth daily.     • B-D ULTRAFINE III SHORT PEN 31G X 8 MM misc      • Calcium Carbonate (CALCIUM 600 PO) Take 2 tablets by mouth daily.     • DULoxetine (CYMBALTA) 60 MG capsule Take 1 capsule by mouth Daily. 90 capsule 1   • ergocalciferol (ERGOCALCIFEROL) 75843 UNITS capsule Take 1 capsule by mouth 1 (One) Time Per Week. 12 capsule 3   • fluticasone (FLONASE) 50 MCG/ACT nasal spray 1 spray into each nostril 2 (two) times a day.     • gabapentin (NEURONTIN) 300 MG capsule TAKE 1 CAPSULE 3 TIMES A    capsule 0   • HYDROcodone-acetaminophen (NORCO) 7.5-325 MG per tablet Take 1 tablet by mouth 2 (two) times a day.     • insulin aspart (novoLOG FLEXPEN) 100 UNIT/ML solution pen-injector sc pen Inject 2-8 Units under the skin 3 (Three) Times a Day With Meals. INJECT 2-8 UNITS THREE TIMES A DAY BEFORE MEALS 15 pen 3   • Insulin Glargine (LANTUS SOLOSTAR) 100 UNIT/ML injection pen Inject 15 Units under the skin every night. 9 mL 5   • levothyroxine (SYNTHROID, LEVOTHROID) 100 MCG tablet Take 1 tablet by mouth Daily. 90 tablet 1   • lisinopril (PRINIVIL,ZESTRIL) 20 MG tablet TAKE ONE TABLET BY MOUTH DAILY 90 tablet 3   • loratadine (CLARITIN) 10 MG tablet Take 1 tablet by mouth Daily. 30 tablet 5   • LORazepam (ATIVAN) 1 MG tablet Take 1 tablet by mouth At Night As Needed for Anxiety or Sleep. 30 tablet 2   • metFORMIN (GLUCOPHAGE) 850 MG tablet Take 1 tablet by mouth 2 (Two) Times a Day With Meals. 180 tablet 1   • Needle, Disp, 31G X 5/16\" misc 1 each " 4 (Four) Times a Day. Pen Needle; 400 each 3   • traZODone (DESYREL) 50 MG tablet TAKE 1 TO 2 TABLETS NIGHTLYAT BEDTIME 180 tablet 0   • TRUE METRIX BLOOD GLUCOSE TEST test strip USE TO TEST FOUR TIMES DAILY 150 each 11   • TRUEPLUS LANCETS 33G misc 1 each 4 (four) times a day. 4 each 0   • cefuroxime (CEFTIN) 500 MG tablet Take 1 tablet by mouth 2 (Two) Times a Day. 20 tablet 0   • nitrofurantoin (MACRODANTIN) 100 MG capsule 1 capsule twice a day 20 capsule 0   • nitrofurantoin, macrocrystal-monohydrate, (MACROBID) 100 MG capsule Take 1 capsule by mouth 2 (Two) Times a Day. 20 capsule 0   • simvastatin (ZOCOR) 20 MG tablet 1 TABLET(S) BY MOUTH AT BEDTIME 90 tablet 3   • sulfamethoxazole-trimethoprim (BACTRIM DS) 800-160 MG per tablet Take 1 tablet by mouth 2 (two) times a day. 20 tablet 0   • sulfamethoxazole-trimethoprim (BACTRIM DS,SEPTRA DS) 800-160 MG per tablet Take 1 tablet by mouth 2 (Two) Times a Day. 20 tablet 0     No current facility-administered medications for this visit.      Allergies   Allergen Reactions   • Penicillins Swelling and Rash     Social History     Social History   • Marital status:      Spouse name: N/A   • Number of children: N/A   • Years of education: N/A     Social History Main Topics   • Smoking status: Never Smoker   • Smokeless tobacco: Never Used   • Alcohol use No   • Drug use: No   • Sexual activity: Defer     Other Topics Concern   • None     Social History Narrative       Review of Systems  Review of Systems   Constitutional: Negative for activity change, appetite change, chills, diaphoresis and fatigue.   HENT: Negative for congestion, dental problem, drooling, ear discharge, ear pain, facial swelling, sneezing, sore throat, tinnitus, trouble swallowing and voice change.    Eyes: Negative for photophobia, pain, discharge, redness, itching and visual disturbance.   Respiratory: Negative for apnea, cough, choking, chest tightness and shortness of breath.   "  Cardiovascular: Negative for chest pain, palpitations and leg swelling.   Gastrointestinal: Negative for abdominal distention, abdominal pain, constipation, diarrhea, nausea and vomiting.   Endocrine: Negative for cold intolerance, heat intolerance, polydipsia, polyphagia and polyuria.   Genitourinary: Negative for difficulty urinating, dysuria, frequency, hematuria and urgency.   Musculoskeletal: Negative for arthralgias, back pain, gait problem, joint swelling, myalgias, neck pain and neck stiffness.   Skin: Negative for color change, pallor, rash and wound.   Allergic/Immunologic: Negative for environmental allergies, food allergies and immunocompromised state.   Neurological: Negative for dizziness, tremors, facial asymmetry, weakness, light-headedness, numbness and headaches.   Hematological: Negative for adenopathy. Does not bruise/bleed easily.   Psychiatric/Behavioral: Negative for agitation, behavioral problems, confusion, decreased concentration and sleep disturbance.        Objective    /70 (BP Location: Left arm, Patient Position: Sitting, Cuff Size: Adult)  Pulse 91  Ht 66\" (167.6 cm)  Wt 184 lb 11.2 oz (83.8 kg)  SpO2 96%  BMI 29.81 kg/m2  Physical Exam   Constitutional: She is oriented to person, place, and time. She appears well-developed and well-nourished. No distress.   HENT:   Head: Normocephalic and atraumatic.   Right Ear: External ear normal.   Left Ear: External ear normal.   Nose: Nose normal.   Eyes: Conjunctivae and EOM are normal. Pupils are equal, round, and reactive to light.   Neck: Normal range of motion. Neck supple. No tracheal deviation present. No thyromegaly present.   Cardiovascular: Normal rate, regular rhythm and normal heart sounds.    No murmur heard.  Pulmonary/Chest: Effort normal and breath sounds normal. No respiratory distress. She has no wheezes.   Abdominal: Soft. Bowel sounds are normal. There is no tenderness. There is no rebound and no guarding. "   Musculoskeletal: Normal range of motion. She exhibits no edema, tenderness or deformity.   Neurological: She is alert and oriented to person, place, and time. No cranial nerve deficit.   Skin: Skin is warm and dry. No rash noted.   Psychiatric: She has a normal mood and affect. Her behavior is normal. Judgment and thought content normal.       Lab Review  Glucose   Date Value   07/19/2017 186 mg/dL (H)   02/24/2017 173 mg/dL (H)   11/16/2016 155 mg/dl (H)   07/20/2016 184 mg/dl (H)   05/03/2016 243 mg/dl (H)     Sodium (mmol/L)   Date Value   07/19/2017 133 (L)   02/24/2017 137   11/16/2016 135.0   07/20/2016 132.0 (L)   05/03/2016 138.0     Potassium (mmol/L)   Date Value   07/19/2017 4.4   02/24/2017 4.3   11/16/2016 4.3   07/20/2016 4.6   05/03/2016 4.7     Chloride (mmol/L)   Date Value   07/19/2017 96 (L)   02/24/2017 100   11/16/2016 98.0 (L)   07/20/2016 95.0 (L)   05/03/2016 102.0     CO2 (mmol/L)   Date Value   07/19/2017 25.0   02/24/2017 25.0   11/16/2016 27.0   07/20/2016 27.0   05/03/2016 27.0     BUN   Date Value   07/19/2017 12 mg/dL   02/24/2017 15 mg/dL   11/16/2016 14 mg/dl   07/20/2016 19 mg/dl   05/03/2016 16 mg/dl     Creatinine   Date Value   07/19/2017 0.70 mg/dL   02/24/2017 0.70 mg/dL   11/16/2016 0.7 mg/dl   07/20/2016 0.8 mg/dl   05/03/2016 0.8 mg/dl     Hemoglobin A1C   Date Value   07/19/2017 7.20 % (H)   02/24/2017 7.29 % (H)   11/16/2016 7.3 %TotHgb (H)   07/20/2016 7.8 %TotHgb (H)   05/03/2016 8.3 %TotHgb (H)     Triglycerides (mg/dl)   Date Value   07/20/2016 143   07/20/2016 147   01/04/2016 105       Assessment/Plan      1. Type 2 diabetes mellitus without complication, with long-term current use of insulin    2. Hashimoto's thyroiditis    3. Vitamin D deficiency    4. Dysuria    5. Mixed hyperlipidemia    6. Essential hypertension    .    Medications prescribed:  Outpatient Encounter Prescriptions as of 7/24/2017   Medication Sig Dispense Refill   • aspirin 81 MG tablet Take 81  "mg by mouth daily.     • B-D ULTRAFINE III SHORT PEN 31G X 8 MM misc      • Calcium Carbonate (CALCIUM 600 PO) Take 2 tablets by mouth daily.     • DULoxetine (CYMBALTA) 60 MG capsule Take 1 capsule by mouth Daily. 90 capsule 1   • ergocalciferol (ERGOCALCIFEROL) 04122 UNITS capsule Take 1 capsule by mouth 1 (One) Time Per Week. 12 capsule 3   • fluticasone (FLONASE) 50 MCG/ACT nasal spray 1 spray into each nostril 2 (two) times a day.     • gabapentin (NEURONTIN) 300 MG capsule TAKE 1 CAPSULE 3 TIMES A    capsule 0   • HYDROcodone-acetaminophen (NORCO) 7.5-325 MG per tablet Take 1 tablet by mouth 2 (two) times a day.     • insulin aspart (novoLOG FLEXPEN) 100 UNIT/ML solution pen-injector sc pen Inject 2-8 Units under the skin 3 (Three) Times a Day With Meals. INJECT 2-8 UNITS THREE TIMES A DAY BEFORE MEALS 15 pen 3   • Insulin Glargine (LANTUS SOLOSTAR) 100 UNIT/ML injection pen Inject 15 Units under the skin every night. 9 mL 5   • levothyroxine (SYNTHROID, LEVOTHROID) 100 MCG tablet Take 1 tablet by mouth Daily. 90 tablet 1   • lisinopril (PRINIVIL,ZESTRIL) 20 MG tablet TAKE ONE TABLET BY MOUTH DAILY 90 tablet 3   • loratadine (CLARITIN) 10 MG tablet Take 1 tablet by mouth Daily. 30 tablet 5   • LORazepam (ATIVAN) 1 MG tablet Take 1 tablet by mouth At Night As Needed for Anxiety or Sleep. 30 tablet 2   • metFORMIN (GLUCOPHAGE) 850 MG tablet Take 1 tablet by mouth 2 (Two) Times a Day With Meals. 180 tablet 1   • Needle, Disp, 31G X 5/16\" misc 1 each 4 (Four) Times a Day. Pen Needle; 400 each 3   • traZODone (DESYREL) 50 MG tablet TAKE 1 TO 2 TABLETS NIGHTLYAT BEDTIME 180 tablet 0   • TRUE METRIX BLOOD GLUCOSE TEST test strip USE TO TEST FOUR TIMES DAILY 150 each 11   • TRUEPLUS LANCETS 33G misc 1 each 4 (four) times a day. 4 each 0   • cefuroxime (CEFTIN) 500 MG tablet Take 1 tablet by mouth 2 (Two) Times a Day. 20 tablet 0   • nitrofurantoin (MACRODANTIN) 100 MG capsule 1 capsule twice a day 20 capsule 0 "   • nitrofurantoin, macrocrystal-monohydrate, (MACROBID) 100 MG capsule Take 1 capsule by mouth 2 (Two) Times a Day. 20 capsule 0   • simvastatin (ZOCOR) 20 MG tablet 1 TABLET(S) BY MOUTH AT BEDTIME 90 tablet 3   • sulfamethoxazole-trimethoprim (BACTRIM DS) 800-160 MG per tablet Take 1 tablet by mouth 2 (two) times a day. 20 tablet 0   • sulfamethoxazole-trimethoprim (BACTRIM DS,SEPTRA DS) 800-160 MG per tablet Take 1 tablet by mouth 2 (Two) Times a Day. 20 tablet 0     No facility-administered encounter medications on file as of 7/24/2017.        Orders placed during this encounter include:  Orders Placed This Encounter   Procedures   • Urinalysis With / Culture If Indicated     Glycemic Management      Lab Results   Component Value Date    HGBA1C 7.20 (H) 07/19/2017          Novolog mix stopped   Januvia stopped it due to headaches  Glimepiride stopped caused higher sugars         Metformin 850 mg one tablet po BID         Lantus 15 units      Novolog for meals      Breakfast -- 6      Lunch -- 7      Supper -- 8               Lipid Management      Simvastatin 20 mg one at bedtime      Lipid at goal         Blood Pressure Management      Lisinopril 20 mg daily     Microvascular Complication Monitoring      Cymbalta 60 mg one daily      Gabapentin 300 mg one TID        hydrocodone-acetaminophen 7.5- 500 mg 2 times daily      3- 15 Microalbuminuria            Bone Health      h o vitamin d def      vitamin d 50,000 units weekly      Feb. 2017      Vitamin d - nl     July 2017     Vitamin d - 46    Continue supplement         Immunization: influenza vaccine Oct. 2016 , shingles vaccine Oct. 2016         Other Diabetes Related Aspects         Hypothyroidism           Levothyroxine 100 mcg one daily Monday through Friday and 1/2 tablet on Saturday and none on Sunday Nov. 2016 TSH - nl     Feb. TSH - nl     Lab Results   Component Value Date    TSH 1.670 07/19/2017         Dysuria    Check u/a today and will  call with results      4. Follow-up: Return in about 6 months (around 1/24/2018) for Recheck.

## 2017-07-27 LAB — BACTERIA SPEC AEROBE CULT: ABNORMAL

## 2017-08-08 ENCOUNTER — OFFICE VISIT (OUTPATIENT)
Dept: FAMILY MEDICINE CLINIC | Facility: CLINIC | Age: 82
End: 2017-08-08

## 2017-08-08 VITALS
DIASTOLIC BLOOD PRESSURE: 68 MMHG | WEIGHT: 184 LBS | BODY MASS INDEX: 29.57 KG/M2 | HEIGHT: 66 IN | SYSTOLIC BLOOD PRESSURE: 122 MMHG

## 2017-08-08 DIAGNOSIS — Z79.4 TYPE 2 DIABETES MELLITUS WITHOUT COMPLICATION, WITH LONG-TERM CURRENT USE OF INSULIN (HCC): Primary | ICD-10-CM

## 2017-08-08 DIAGNOSIS — L82.1 SEBORRHEIC KERATOSIS: ICD-10-CM

## 2017-08-08 DIAGNOSIS — N39.0 URINARY TRACT INFECTION WITHOUT HEMATURIA, SITE UNSPECIFIED: ICD-10-CM

## 2017-08-08 DIAGNOSIS — E11.9 TYPE 2 DIABETES MELLITUS WITHOUT COMPLICATION, WITH LONG-TERM CURRENT USE OF INSULIN (HCC): Primary | ICD-10-CM

## 2017-08-08 DIAGNOSIS — R30.0 DYSURIA: Primary | ICD-10-CM

## 2017-08-08 LAB
BACTERIA UR QL AUTO: ABNORMAL /HPF
BILIRUB UR QL STRIP: NEGATIVE
CLARITY UR: ABNORMAL
COLOR UR: YELLOW
GLUCOSE UR STRIP-MCNC: NEGATIVE MG/DL
HGB UR QL STRIP.AUTO: ABNORMAL
HYALINE CASTS UR QL AUTO: ABNORMAL /LPF
KETONES UR QL STRIP: NEGATIVE
LEUKOCYTE ESTERASE UR QL STRIP.AUTO: ABNORMAL
MUCOUS THREADS URNS QL MICRO: ABNORMAL /HPF
NITRITE UR QL STRIP: NEGATIVE
PH UR STRIP.AUTO: 5.5 [PH] (ref 5.5–8)
PROT UR QL STRIP: NEGATIVE
RBC # UR: ABNORMAL /HPF
REF LAB TEST METHOD: ABNORMAL
RENAL EPI CELLS #/AREA URNS HPF: ABNORMAL /HPF
SP GR UR STRIP: 1.01 (ref 1–1.03)
SQUAMOUS #/AREA URNS HPF: ABNORMAL /HPF
UROBILINOGEN UR QL STRIP: ABNORMAL
WBC UR QL AUTO: ABNORMAL /HPF

## 2017-08-08 PROCEDURE — 81001 URINALYSIS AUTO W/SCOPE: CPT | Performed by: FAMILY MEDICINE

## 2017-08-08 PROCEDURE — 87186 SC STD MICRODIL/AGAR DIL: CPT | Performed by: FAMILY MEDICINE

## 2017-08-08 PROCEDURE — 87077 CULTURE AEROBIC IDENTIFY: CPT | Performed by: FAMILY MEDICINE

## 2017-08-08 PROCEDURE — 87086 URINE CULTURE/COLONY COUNT: CPT | Performed by: FAMILY MEDICINE

## 2017-08-08 PROCEDURE — 99214 OFFICE O/P EST MOD 30 MIN: CPT | Performed by: FAMILY MEDICINE

## 2017-08-08 RX ORDER — LEVOFLOXACIN 250 MG/1
250 TABLET ORAL DAILY
Qty: 10 TABLET | Refills: 0 | Status: SHIPPED | OUTPATIENT
Start: 2017-08-08 | End: 2017-08-29

## 2017-08-08 NOTE — PROGRESS NOTES
Subjective   Nyla Piña is a 82 y.o. female.  She's here for continued dysuria frequency and urgency.  Was recently given Macrodantin after urinalysis, but that was prior to culture and sensitivity which shows sensitivity to Levaquin.  She's been having recurrent urinary tract infections most of which have been resistant to anything oral.    She has a skin lesion on her face that comes off when she washes her face or accidentally scratches it and it's quite bothersome.  It bleeds at times        Urinary Tract Infection    This is a recurrent problem. The current episode started 1 to 4 weeks ago. The problem occurs every urination. The problem has been unchanged. The quality of the pain is described as burning and aching. The pain is at a severity of 6/10. The pain is moderate. There has been no fever. She is not sexually active. There is no history of pyelonephritis. Associated symptoms include frequency and urgency. Pertinent negatives include no chills, discharge, flank pain, hematuria, hesitancy, nausea, possible pregnancy, sweats or vomiting. She has tried antibiotics and increased fluids for the symptoms. The treatment provided mild relief.        The following portions of the patient's history were reviewed and updated as appropriate: allergies, current medications, past family history, past medical history, past social history, past surgical history and problem list.    Review of Systems   Constitutional: Negative for chills.   HENT: Negative.    Respiratory: Negative.  Negative for shortness of breath.    Cardiovascular: Negative.    Gastrointestinal: Negative.  Negative for nausea and vomiting.   Genitourinary: Positive for frequency and urgency. Negative for flank pain, hematuria and hesitancy.   Musculoskeletal: Negative.    Skin: Negative.    Allergic/Immunologic: Negative for immunocompromised state.   Neurological: Negative for dizziness, tremors, seizures and syncope.   Hematological: Negative.     Psychiatric/Behavioral: Positive for sleep disturbance. Negative for agitation, confusion and dysphoric mood. The patient is not nervous/anxious.        Objective   Physical Exam   Constitutional: She is oriented to person, place, and time. She appears well-developed and well-nourished.   HENT:   Head: Normocephalic and atraumatic.   Nose: Nose normal.   Mouth/Throat: Oropharynx is clear and moist.   Eyes: Conjunctivae and EOM are normal. Pupils are equal, round, and reactive to light.   Neck: Normal range of motion. Neck supple. No JVD present. No tracheal deviation present. No thyromegaly present.   Cardiovascular: Normal rate, regular rhythm, normal heart sounds and intact distal pulses.    No murmur heard.  Pulmonary/Chest: Effort normal and breath sounds normal. She has no wheezes.   Abdominal: Soft. Bowel sounds are normal. She exhibits no distension. There is no tenderness.   Musculoskeletal: Normal range of motion. She exhibits no edema.   Lymphadenopathy:     She has no cervical adenopathy.   Neurological: She is alert and oriented to person, place, and time. Coordination normal.   Skin: Skin is warm and dry. No rash noted.   Brownish crusted pigmented seborrheic lesion on the left face just above the angle of the jaw, approximate 5 mm.  Cryotherapy is applied here   Psychiatric: She has a normal mood and affect.   Nursing note and vitals reviewed.       Ref Range & Units 8:40 AM     RBC, UA None Seen /HPF 6-12 (A)   WBC, UA None Seen /HPF Too Numerous to Count (A)   Bacteria, UA None Seen /HPF 1+ (A)   Squamous Epithelial Cells, UA None Seen, 0-2 /HPF 0-2   Hyaline Casts, UA None Seen /LPF None Seen   Methodology  Manual Light Microscopy   Resulting Agency  [facility] [place] [facility]      Specimen Collected: 07/05/17  8:40 AM Last Resulted: 07/05/17  8:54 AM                          Urinalysis With / Culture If Indicated   Order: [id number]   Status:  Final result   Visible to patient:  No (Not Released) Dx:  Dysuria       Ref Range & Units 8:40 AM     Color, UA Yellow, Straw Yellow   Appearance, UA Clear Turbid (A)   pH, UA 5.5 - 8.0 6.0   Specific Gravity, UA 1.005 - 1.030 1.020   Glucose, UA Negative Negative   Ketones, UA Negative Negative   Bilirubin, UA Negative Negative   Blood, UA Negative Moderate (2+) (A)   Protein, UA Negative 100 mg/dL (2+) (A)   Leuk Esterase, UA Negative Large (3+) (A)   Nitrite, UA Negative Positive (A)   Urobilinogen, UA 0.2 - 1.0 E.U./dL 0.2 E.U./dL   Resulting Agency  [facility] [place] [facility]      Specimen Collected: 07/05/17  8:40 AM Last Resulted: 07/05/17  8:54 AM                  Assessment/Plan   Nyla was seen today for urinary tract infection.    Diagnoses and all orders for this visit:    Type 2 diabetes mellitus without complication, with long-term current use of insulin    Urinary tract infection without hematuria, site unspecified    Seborrheic keratosis    Other orders  -     levoFLOXacin (LEVAQUIN) 250 MG tablet; Take 1 tablet by mouth Daily.      Culture shows sensitivity to Levaquin.  We'll start this daily and recheck if symptoms persist    Cryotherapy applied for the mole on the face which is a seborrheic in nature but continued to get in her weight is up was bleeding and peeling off when she washed her face.  Contact me for recurrence    Continue current regimen for diabetes and refilled prescription for supplies today so she can test blood sugars daily and when needed

## 2017-08-09 RX ORDER — TRAZODONE HYDROCHLORIDE 50 MG/1
TABLET ORAL
Qty: 180 TABLET | Refills: 0 | Status: SHIPPED | OUTPATIENT
Start: 2017-08-09 | End: 2017-10-29 | Stop reason: SDUPTHER

## 2017-08-11 LAB — BACTERIA SPEC AEROBE CULT: ABNORMAL

## 2017-08-18 RX ORDER — LANCETS 28 GAUGE
EACH MISCELLANEOUS
Qty: 150 EACH | Refills: 11 | Status: SHIPPED | OUTPATIENT
Start: 2017-08-18 | End: 2018-08-24 | Stop reason: SDUPTHER

## 2017-08-29 ENCOUNTER — OFFICE VISIT (OUTPATIENT)
Dept: FAMILY MEDICINE CLINIC | Facility: CLINIC | Age: 82
End: 2017-08-29

## 2017-08-29 VITALS
DIASTOLIC BLOOD PRESSURE: 82 MMHG | SYSTOLIC BLOOD PRESSURE: 148 MMHG | HEIGHT: 66 IN | WEIGHT: 189 LBS | BODY MASS INDEX: 30.37 KG/M2

## 2017-08-29 DIAGNOSIS — M54.6 ACUTE LEFT-SIDED THORACIC BACK PAIN: Primary | ICD-10-CM

## 2017-08-29 PROCEDURE — 99214 OFFICE O/P EST MOD 30 MIN: CPT | Performed by: FAMILY MEDICINE

## 2017-08-29 NOTE — PROGRESS NOTES
Subjective   Nyla Piña is a 82 y.o. female who presents to the office for follow-up on some low back pain.  She was seen recently for a UTI and had pain in the left low back to flank area.  She thought it might be her kidneys that she saw a chiropractor who told her that she had some back issues.  An x-ray was done there.    History of Present Illness   Back Pain   This is a chronic problem. The current episode started about a month ago. The problem occurs constantly. The problem is unchanged. The pain is present in the lumbar spine. The quality of the pain is described as shooting, stabbing and aching. The pain radiates through the left flank into the left hip area The pain is at a severity of 8/10. The pain is severe. The pain is the same all the time. The symptoms are aggravated by standing, twisting, position, bending and sitting. Stiffness is present at night, all day and in the morning. Associated symptoms include leg pain and tingling. Pertinent negatives include no abdominal pain, bladder incontinence, bowel incontinence, chest pain, dysuria, fever, headaches, numbness, paresis, paresthesias, pelvic pain, perianal numbness, weakness or weight loss. Risk factors include sedentary lifestyle. Patient has tried analgesics, . The treatment provided mild relief.     The following portions of the patient's history were reviewed and updated as appropriate: allergies, current medications, past family history, past medical history, past social history, past surgical history and problem list.    Review of Systems   Constitutional: Negative for chills.   HENT: Negative.    Respiratory: Negative.  Negative for shortness of breath.    Cardiovascular: Negative.    Gastrointestinal: Negative.  Negative for nausea and vomiting.   Genitourinary: Positive for frequency and urgency. Negative for flank pain and hematuria.   Musculoskeletal: Negative.    Skin: Negative.    Allergic/Immunologic: Negative for  immunocompromised state.   Neurological: Negative for dizziness, tremors, seizures and syncope.   Hematological: Negative.    Psychiatric/Behavioral: Positive for sleep disturbance. Negative for agitation, confusion and dysphoric mood. The patient is not nervous/anxious.    All other systems reviewed and are negative.      Objective   Physical Exam   Constitutional: She is oriented to person, place, and time. She appears well-developed and well-nourished.   HENT:   Head: Normocephalic and atraumatic.   Nose: Nose normal.   Mouth/Throat: Oropharynx is clear and moist.   Eyes: Conjunctivae and EOM are normal. Pupils are equal, round, and reactive to light.   Neck: Normal range of motion. Neck supple. No JVD present. No tracheal deviation present. No thyromegaly present.   Cardiovascular: Normal rate, regular rhythm, normal heart sounds and intact distal pulses.    No murmur heard.  Pulmonary/Chest: Effort normal and breath sounds normal. She has no wheezes.   Abdominal: Soft. Bowel sounds are normal. She exhibits no distension. There is no tenderness.   Musculoskeletal: Normal range of motion. She exhibits no edema.   Lymphadenopathy:     She has no cervical adenopathy.   Neurological: She is alert and oriented to person, place, and time. Coordination normal.   Skin: Skin is warm and dry. No rash noted.   Brownish crusted pigmented seborrheic lesion on the left face just above the angle of the jaw, approximate 5 mm.  Cryotherapy is applied here   Psychiatric: She has a normal mood and affect.   Nursing note and vitals reviewed.      Assessment/Plan   Nyla was seen today for sinusitis.    Diagnoses and all orders for this visit:    Acute left-sided thoracic back pain  -     MRI thoracic spine w wo contrast; Future    Other orders  -     SCANNED - IMAGING    Will get an MRI through the thoracic spine area to evaluate for radicular pain from the mid back to the left flank, possibly for tubal compression fracture or  disc generation.  She has pain medicine and doesn't feel that she needs more right now will follow up with me after the MRI     ADDENDUM:  MRI shows multilevel DDD, arthritis.  Will refill medication for pain as at her age she is not a good candidate for NSAIDs.

## 2017-09-05 RX ORDER — LEVOTHYROXINE SODIUM 0.1 MG/1
TABLET ORAL
Qty: 90 TABLET | Refills: 1 | Status: SHIPPED | OUTPATIENT
Start: 2017-09-05 | End: 2018-11-01 | Stop reason: SDUPTHER

## 2017-09-14 RX ORDER — HYDROCODONE BITARTRATE AND ACETAMINOPHEN 7.5; 325 MG/1; MG/1
1 TABLET ORAL 2 TIMES DAILY
Qty: 60 TABLET | Refills: 0 | Status: SHIPPED | OUTPATIENT
Start: 2017-09-14 | End: 2017-12-04 | Stop reason: SDUPTHER

## 2017-09-14 RX ORDER — DULOXETIN HYDROCHLORIDE 60 MG/1
60 CAPSULE, DELAYED RELEASE ORAL DAILY
Qty: 90 CAPSULE | Refills: 1 | Status: SHIPPED | OUTPATIENT
Start: 2017-09-14 | End: 2018-04-17

## 2017-09-14 RX ORDER — GABAPENTIN 300 MG/1
CAPSULE ORAL
Qty: 270 CAPSULE | Refills: 0 | Status: SHIPPED | OUTPATIENT
Start: 2017-09-14 | End: 2017-12-18 | Stop reason: SDUPTHER

## 2017-09-25 RX ORDER — CALCIUM CITRATE/VITAMIN D3 200MG-6.25
TABLET ORAL
Qty: 150 EACH | Refills: 11 | Status: SHIPPED | OUTPATIENT
Start: 2017-09-25

## 2017-10-05 ENCOUNTER — OFFICE VISIT (OUTPATIENT)
Dept: FAMILY MEDICINE CLINIC | Facility: CLINIC | Age: 82
End: 2017-10-05

## 2017-10-05 VITALS
SYSTOLIC BLOOD PRESSURE: 128 MMHG | WEIGHT: 189 LBS | HEIGHT: 66 IN | BODY MASS INDEX: 30.37 KG/M2 | DIASTOLIC BLOOD PRESSURE: 86 MMHG

## 2017-10-05 DIAGNOSIS — R53.83 OTHER FATIGUE: ICD-10-CM

## 2017-10-05 DIAGNOSIS — Z86.2 H/O MACROCYTIC ANEMIA: Primary | ICD-10-CM

## 2017-10-05 DIAGNOSIS — G89.29 CHRONIC LEFT SHOULDER PAIN: ICD-10-CM

## 2017-10-05 DIAGNOSIS — M25.512 CHRONIC LEFT SHOULDER PAIN: ICD-10-CM

## 2017-10-05 DIAGNOSIS — E53.8 B12 DEFICIENCY: ICD-10-CM

## 2017-10-05 PROCEDURE — 99214 OFFICE O/P EST MOD 30 MIN: CPT | Performed by: FAMILY MEDICINE

## 2017-10-05 PROCEDURE — 96372 THER/PROPH/DIAG INJ SC/IM: CPT | Performed by: FAMILY MEDICINE

## 2017-10-05 RX ORDER — LORAZEPAM 1 MG/1
1 TABLET ORAL NIGHTLY PRN
Qty: 30 TABLET | Refills: 2 | Status: SHIPPED | OUTPATIENT
Start: 2017-10-05 | End: 2018-01-08 | Stop reason: SDUPTHER

## 2017-10-05 RX ORDER — MONTELUKAST SODIUM 10 MG/1
10 TABLET ORAL NIGHTLY
Qty: 30 TABLET | Refills: 5 | Status: SHIPPED | OUTPATIENT
Start: 2017-10-05 | End: 2018-04-17 | Stop reason: SDUPTHER

## 2017-10-05 RX ORDER — AZITHROMYCIN 250 MG/1
TABLET, FILM COATED ORAL
Qty: 6 TABLET | Refills: 0 | Status: SHIPPED | OUTPATIENT
Start: 2017-10-05 | End: 2017-10-10

## 2017-10-05 RX ORDER — CYANOCOBALAMIN 1000 UG/ML
1000 INJECTION, SOLUTION INTRAMUSCULAR; SUBCUTANEOUS ONCE
Status: COMPLETED | OUTPATIENT
Start: 2017-10-05 | End: 2017-10-05

## 2017-10-05 RX ADMIN — CYANOCOBALAMIN 1000 MCG: 1000 INJECTION, SOLUTION INTRAMUSCULAR; SUBCUTANEOUS at 13:29

## 2017-10-05 NOTE — PROGRESS NOTES
Subjective   Nyla Piña is a 82 y.o. female who presents to the office for upper respiratory congestion for 2 weeks or more. Cough is productive of thick yellow sputum.  She has been on claritin and flonase.     She also has a lot of pain in her left shoulder.  She's been trying not to take the pain medication for fear of getting hooked on it but when she does take it it helps the shoulder.  It's worse when she moves much and at times it's difficult to lift objects and do her housework because of the pain    She's also noticed feeling more tired than usual.  She has a history of B12 deficiency anemia in the past and it feels similar.  Sometimes she notes that she feels hot and sweaty when she walks but she denies any increasing shortness of breath or chest pain.  She has trouble sleeping even with the medication sometimes and feels tired all day.    Fatigue   This is a recurrent problem. The current episode started more than 1 month ago. The problem has been gradually worsening. Associated symptoms include arthralgias, congestion, coughing, diaphoresis, fatigue, myalgias, a sore throat and urinary symptoms. Pertinent negatives include no abdominal pain, anorexia, change in bowel habit, chest pain, chills, fever, headaches, joint swelling, nausea, neck pain, numbness, swollen glands or vomiting. The symptoms are aggravated by exertion. She has tried rest for the symptoms. The treatment provided mild relief.    Insomnia   This is a chronic problem. The current episode started more than 1 year ago. The problem occurs daily. The problem has been unchanged. Associated symptoms include fatigue. Pertinent negatives include no abdominal pain, anorexia, arthralgias, change in bowel habit, chest pain, chills, congestion, coughing, diaphoresis, fever, headaches, joint swelling, myalgias, nausea, neck pain, numbness, rash, sore throat, swollen glands, urinary symptoms, vertigo, visual change, vomiting or weakness. The  symptoms are aggravated by exertion and stress. Treatments tried: OTC sleep aids. The treatment provided no relief.       The following portions of the patient's history were reviewed and updated as appropriate: allergies, current medications, past family history, past medical history, past social history, past surgical history and problem list.    Review of Systems   Constitutional: Positive for diaphoresis and fatigue. Negative for chills and fever.   HENT: Positive for congestion, postnasal drip, rhinorrhea, sinus pain, sinus pressure and sore throat.    Respiratory: Positive for cough. Negative for shortness of breath.    Cardiovascular: Negative.  Negative for chest pain.   Gastrointestinal: Negative for abdominal pain, anorexia, change in bowel habit, nausea and vomiting.   Genitourinary: Positive for urgency. Negative for flank pain, frequency and hematuria.   Musculoskeletal: Positive for arthralgias and myalgias. Negative for joint swelling and neck pain.   Skin: Negative.    Allergic/Immunologic: Negative for immunocompromised state.   Neurological: Negative for dizziness, tremors, seizures, syncope, numbness and headaches.   Hematological: Negative.    Psychiatric/Behavioral: Positive for sleep disturbance. Negative for agitation, confusion and dysphoric mood. The patient is not nervous/anxious.        Objective   Physical Exam   Constitutional: She is oriented to person, place, and time. She appears well-developed and well-nourished.   HENT:   Head: Normocephalic and atraumatic.   Nose: Nose normal.   Mouth/Throat: Oropharynx is clear and moist.   Nasal mucosa red and swollen with thick yellow drainage.  Sinuses tender over both maxillaries, throat is slightly red.   Eyes: Conjunctivae and EOM are normal. Pupils are equal, round, and reactive to light.   Neck: Normal range of motion. Neck supple. No JVD present. No tracheal deviation present. No thyromegaly present.   Cardiovascular: Normal rate, regular  rhythm, normal heart sounds and intact distal pulses.    No murmur heard.  Pulmonary/Chest: Effort normal and breath sounds normal. She has no wheezes.   Abdominal: Soft. Bowel sounds are normal. She exhibits no distension. There is no tenderness.   Musculoskeletal: She exhibits no edema.   Generalized joint stiffness noted.  Some decreased range of motion in the left shoulder and anterior tenderness noted   Lymphadenopathy:     She has no cervical adenopathy.   Neurological: She is alert and oriented to person, place, and time. Coordination normal.   Skin: Skin is warm and dry. No rash noted.   Brownish crusted pigmented seborrheic lesion on the left face just above the angle of the jaw, approximate 5 mm.  Cryotherapy is applied here   Psychiatric: She has a normal mood and affect.   Nursing note and vitals reviewed.      Assessment/Plan   Nyla was seen today for allergies, shoulder pain and fatigue.    Diagnoses and all orders for this visit:    H/O macrocytic anemia    B12 deficiency  -     cyanocobalamin injection 1,000 mcg; Inject 1 mL into the shoulder, thigh, or buttocks 1 (One) Time.    Other fatigue    Chronic left shoulder pain    Other orders  -     LORazepam (ATIVAN) 1 MG tablet; Take 1 tablet by mouth At Night As Needed for Anxiety or Sleep.  -     azithromycin (ZITHROMAX) 250 MG tablet; Take 2 tablets the first day, then 1 tablet daily for 4 days.  -     montelukast (SINGULAIR) 10 MG tablet; Take 1 tablet by mouth Every Night for 180 days.  -     diclofenac (VOLTAREN) 1 % gel gel; Apply 4 g topically 4 (Four) Times a Day.  The patient has read and signed the [facility] Controlled Substance Contract.  I will continue to see patient for regular follow up appointments. Patient is well controlled on the medication.  ANGELITO has been reviewed by me and is updated every 3 months. The patient is aware of the potential for addiction and dependence.   Continue with lorazepam at nighttime only when needed  for sleep and she does not take it every night and she is instructed not to take it concurrently with her pain medication due to increased risk of death    Gave Zithromax and Singulair for the upper respiratory symptoms.    We'll add Voltaren gel for shoulder pain and she's encouraged to take the pain medicine when needed as she is not a candidate for NSAIDs.    Gave a B12 shot today due to history of anemia and fatigue with recurrent symptoms.

## 2017-10-25 RX ORDER — INSULIN GLARGINE 100 [IU]/ML
INJECTION, SOLUTION SUBCUTANEOUS
Qty: 15 ML | Refills: 3 | Status: SHIPPED | OUTPATIENT
Start: 2017-10-25 | End: 2018-06-06 | Stop reason: CLARIF

## 2017-10-31 ENCOUNTER — LAB (OUTPATIENT)
Dept: LAB | Facility: OTHER | Age: 82
End: 2017-10-31

## 2017-10-31 DIAGNOSIS — R30.0 DYSURIA: ICD-10-CM

## 2017-10-31 DIAGNOSIS — R30.0 DYSURIA: Primary | ICD-10-CM

## 2017-10-31 LAB
BACTERIA UR QL AUTO: ABNORMAL /HPF
BILIRUB UR QL STRIP: NEGATIVE
CLARITY UR: CLEAR
COLOR UR: YELLOW
GLUCOSE UR STRIP-MCNC: NEGATIVE MG/DL
HGB UR QL STRIP.AUTO: NEGATIVE
HYALINE CASTS UR QL AUTO: ABNORMAL /LPF
KETONES UR QL STRIP: NEGATIVE
LEUKOCYTE ESTERASE UR QL STRIP.AUTO: ABNORMAL
NITRITE UR QL STRIP: NEGATIVE
PH UR STRIP.AUTO: 6 [PH] (ref 5.5–8)
PROT UR QL STRIP: NEGATIVE
RBC # UR: ABNORMAL /HPF
REF LAB TEST METHOD: ABNORMAL
RENAL EPI CELLS #/AREA URNS HPF: ABNORMAL /HPF
SP GR UR STRIP: 1.01 (ref 1–1.03)
SQUAMOUS #/AREA URNS HPF: ABNORMAL /HPF
UROBILINOGEN UR QL STRIP: ABNORMAL
WBC UR QL AUTO: ABNORMAL /HPF

## 2017-10-31 PROCEDURE — 81001 URINALYSIS AUTO W/SCOPE: CPT | Performed by: FAMILY MEDICINE

## 2017-11-01 RX ORDER — INSULIN ASPART 100 [IU]/ML
INJECTION, SOLUTION INTRAVENOUS; SUBCUTANEOUS
Qty: 30 ML | Refills: 2 | Status: SHIPPED | OUTPATIENT
Start: 2017-11-01 | End: 2018-01-25 | Stop reason: SDUPTHER

## 2017-11-01 RX ORDER — LEVOTHYROXINE SODIUM 0.1 MG/1
TABLET ORAL
Qty: 90 TABLET | Refills: 0 | Status: SHIPPED | OUTPATIENT
Start: 2017-11-01 | End: 2018-01-25 | Stop reason: SDUPTHER

## 2017-11-01 RX ORDER — TRAZODONE HYDROCHLORIDE 50 MG/1
TABLET ORAL
Qty: 180 TABLET | Refills: 0 | Status: SHIPPED | OUTPATIENT
Start: 2017-11-01 | End: 2018-11-01

## 2017-11-01 RX ORDER — DULOXETIN HYDROCHLORIDE 60 MG/1
CAPSULE, DELAYED RELEASE ORAL
Qty: 90 CAPSULE | Refills: 0 | Status: SHIPPED | OUTPATIENT
Start: 2017-11-01 | End: 2018-01-25 | Stop reason: SDUPTHER

## 2017-11-01 NOTE — PROGRESS NOTES
Please call the patient regarding her abnormal result.  She may be trying to get a UTI again.  It's mildly positive.  Start Levaquin 250 once daily for 7 days as this was sensitive last time.

## 2017-11-02 RX ORDER — LEVOFLOXACIN 250 MG/1
250 TABLET ORAL DAILY
Qty: 7 TABLET | Refills: 0 | Status: SHIPPED | OUTPATIENT
Start: 2017-11-02 | End: 2018-01-25 | Stop reason: SDUPTHER

## 2017-12-04 RX ORDER — HYDROCODONE BITARTRATE AND ACETAMINOPHEN 7.5; 325 MG/1; MG/1
1 TABLET ORAL 2 TIMES DAILY
Qty: 60 TABLET | Refills: 0 | Status: SHIPPED | OUTPATIENT
Start: 2017-12-04 | End: 2018-01-08 | Stop reason: SDUPTHER

## 2017-12-06 DIAGNOSIS — Z12.31 ENCOUNTER FOR SCREENING MAMMOGRAM FOR MALIGNANT NEOPLASM OF BREAST: Primary | ICD-10-CM

## 2017-12-07 RX ORDER — LORAZEPAM 1 MG/1
TABLET ORAL
Qty: 30 TABLET | Refills: 2 | OUTPATIENT
Start: 2017-12-07

## 2017-12-07 RX ORDER — LISINOPRIL 20 MG/1
TABLET ORAL
Qty: 90 TABLET | Refills: 3 | Status: SHIPPED | OUTPATIENT
Start: 2017-12-07 | End: 2018-03-14 | Stop reason: SDUPTHER

## 2017-12-18 RX ORDER — GABAPENTIN 300 MG/1
CAPSULE ORAL
Qty: 90 CAPSULE | Refills: 0 | Status: SHIPPED | OUTPATIENT
Start: 2017-12-18 | End: 2018-02-13 | Stop reason: SDUPTHER

## 2018-01-02 ENCOUNTER — OFFICE VISIT (OUTPATIENT)
Dept: ORTHOPEDIC SURGERY | Facility: CLINIC | Age: 83
End: 2018-01-02

## 2018-01-02 VITALS — HEIGHT: 66 IN | BODY MASS INDEX: 30.37 KG/M2 | WEIGHT: 189 LBS

## 2018-01-02 DIAGNOSIS — Z79.4 TYPE 2 DIABETES MELLITUS WITHOUT COMPLICATION, WITH LONG-TERM CURRENT USE OF INSULIN (HCC): ICD-10-CM

## 2018-01-02 DIAGNOSIS — M17.0 PRIMARY OSTEOARTHRITIS OF BOTH KNEES: ICD-10-CM

## 2018-01-02 DIAGNOSIS — I10 ESSENTIAL HYPERTENSION: ICD-10-CM

## 2018-01-02 DIAGNOSIS — E11.9 TYPE 2 DIABETES MELLITUS WITHOUT COMPLICATION, WITH LONG-TERM CURRENT USE OF INSULIN (HCC): ICD-10-CM

## 2018-01-02 DIAGNOSIS — M25.561 RIGHT KNEE PAIN, UNSPECIFIED CHRONICITY: Primary | ICD-10-CM

## 2018-01-02 PROCEDURE — 20610 DRAIN/INJ JOINT/BURSA W/O US: CPT | Performed by: ORTHOPAEDIC SURGERY

## 2018-01-02 PROCEDURE — 99214 OFFICE O/P EST MOD 30 MIN: CPT | Performed by: ORTHOPAEDIC SURGERY

## 2018-01-02 RX ADMIN — LIDOCAINE HYDROCHLORIDE 2 ML: 20 INJECTION, SOLUTION INFILTRATION; PERINEURAL at 14:26

## 2018-01-02 RX ADMIN — TRIAMCINOLONE ACETONIDE 40 MG: 40 INJECTION, SUSPENSION INTRA-ARTICULAR; INTRAMUSCULAR at 14:26

## 2018-01-02 NOTE — PROGRESS NOTES
Nyla Piña is a 82 y.o. female returns for     Chief Complaint   Patient presents with   • Right Knee - Follow-up       HISTORY OF PRESENT ILLNESS:   steroid injection right knee done on 1/11/2017, xrays done today. Patient takes norco as needed.   The injections seems to have helped.  She still has pain with activity and she has pain with walking.       CONCURRENT MEDICAL HISTORY:    Past Medical History:   Diagnosis Date   • Acute bronchitis    • Acute sinusitis    • Acute upper respiratory infection    • Anxiety    • Cellulitis of lower leg    • Chronic urinary tract infection    • Cough    • Death of     • Diabetic asymmetric polyneuropathy    • Diabetic peripheral neuropathy    • Diabetic polyneuropathy    • Disease of nail    • Dorsalgia    • Flank pain    • Hashimoto's thyroiditis    • History of echocardiogram 02/19/2013   • Hypertensive disorder    • Insomnia    • Mixed hyperlipidemia    • Non-healing surgical wound    • Osteoarthritis of multiple joints    • Peripheral vascular disease    • Seasonal allergic rhinitis    • Type II diabetes mellitus, uncontrolled    • Upper respiratory infection    • Urinary tract infectious disease    • Urinary tract infectious disease    • Vitamin D deficiency        Allergies   Allergen Reactions   • Penicillins Swelling and Rash         Current Outpatient Prescriptions:   •  aspirin 81 MG tablet, Take 81 mg by mouth daily., Disp: , Rfl:   •  B-D ULTRAFINE III SHORT PEN 31G X 8 MM misc, , Disp: , Rfl:   •  Calcium Carbonate (CALCIUM 600 PO), Take 2 tablets by mouth daily., Disp: , Rfl:   •  diclofenac (VOLTAREN) 1 % gel gel, Apply 4 g topically 4 (Four) Times a Day., Disp: 100 g, Rfl: 11  •  DULoxetine (CYMBALTA) 60 MG capsule, Take 1 capsule by mouth Daily., Disp: 90 capsule, Rfl: 1  •  DULoxetine (CYMBALTA) 60 MG capsule, TAKE 1 CAPSULE DAILY, Disp: 90 capsule, Rfl: 0  •  EASY TOUCH LANCETS 28G/TWIST misc, TEST FOUR TIMES A DAY, Disp: 150 each, Rfl: 11  •   "ergocalciferol (ERGOCALCIFEROL) 15065 UNITS capsule, Take 1 capsule by mouth 1 (One) Time Per Week., Disp: 12 capsule, Rfl: 3  •  fluticasone (FLONASE) 50 MCG/ACT nasal spray, 1 spray into each nostril 2 (two) times a day., Disp: , Rfl:   •  gabapentin (NEURONTIN) 300 MG capsule, 1 capsule TID, Disp: 90 capsule, Rfl: 0  •  HYDROcodone-acetaminophen (NORCO) 7.5-325 MG per tablet, Take 1 tablet by mouth 2 (Two) Times a Day., Disp: 60 tablet, Rfl: 0  •  insulin aspart (novoLOG FLEXPEN) 100 UNIT/ML solution pen-injector sc pen, Inject 2-8 Units under the skin 3 (Three) Times a Day With Meals. INJECT 2-8 UNITS THREE TIMES A DAY BEFORE MEALS, Disp: 15 pen, Rfl: 3  •  LANTUS SOLOSTAR 100 UNIT/ML injection pen, INJECT SUBCUTANEOUSLY 15   UNITS EVERY NIGHT, Disp: 15 mL, Rfl: 3  •  levoFLOXacin (LEVAQUIN) 250 MG tablet, Take 1 tablet by mouth Daily., Disp: 7 tablet, Rfl: 0  •  levothyroxine (SYNTHROID, LEVOTHROID) 100 MCG tablet, TAKE 1 TABLET DAILY, Disp: 90 tablet, Rfl: 1  •  levothyroxine (SYNTHROID, LEVOTHROID) 100 MCG tablet, TAKE 1 TABLET DAILY AS     DIRECTED, Disp: 90 tablet, Rfl: 0  •  lisinopril (PRINIVIL,ZESTRIL) 20 MG tablet, TAKE ONE TABLET BY MOUTH DAILY, Disp: 90 tablet, Rfl: 3  •  LORazepam (ATIVAN) 1 MG tablet, Take 1 tablet by mouth At Night As Needed for Anxiety or Sleep., Disp: 30 tablet, Rfl: 2  •  metFORMIN (GLUCOPHAGE) 850 MG tablet, TAKE 1 TABLET TWICE A DAY  WITH MEALS, Disp: 180 tablet, Rfl: 1  •  metFORMIN (GLUCOPHAGE) 850 MG tablet, TAKE 1 TABLET TWICE A DAY, Disp: 180 tablet, Rfl: 0  •  montelukast (SINGULAIR) 10 MG tablet, Take 1 tablet by mouth Every Night for 180 days., Disp: 30 tablet, Rfl: 5  •  Needle, Disp, 31G X 5/16\" misc, 1 each 4 (Four) Times a Day. Pen Needle;, Disp: 400 each, Rfl: 3  •  nitrofurantoin (MACRODANTIN) 100 MG capsule, 1 capsule twice a day, Disp: 20 capsule, Rfl: 0  •  NOVOLOG FLEXPEN 100 UNIT/ML solution pen-injector sc pen, INJECT 2-8 UNITS           SUBCUTANEOUSLY WITH " "MEALS, Disp: 30 mL, Rfl: 2  •  simvastatin (ZOCOR) 20 MG tablet, 1 TABLET(S) BY MOUTH AT BEDTIME, Disp: 90 tablet, Rfl: 3  •  traZODone (DESYREL) 50 MG tablet, TAKE 1 TO 2 TABLETS NIGHTLYAT BEDTIME, Disp: 180 tablet, Rfl: 0  •  TRUE METRIX BLOOD GLUCOSE TEST test strip, USE TO TEST FOUR TIMES DAILY, Disp: 150 each, Rfl: 11    Past Surgical History:   Procedure Laterality Date   • EXCISION MASS LEG Left 11/05/2013    Excision of soft tissue mass of left leg   • STEROID INJECTION  02/08/2016    Depo Medrol (Methylprednisone) 80mg (Acute upper respiratory infection, unspecified)        ROS  No fevers or chills.  No chest pain or shortness of air.  No GI or  disturbances.    PHYSICAL EXAMINATION:       Ht 167.6 cm (66\")  Wt 85.7 kg (189 lb)  BMI 30.51 kg/m2    Physical Exam   Constitutional: She is oriented to person, place, and time. She appears well-developed and well-nourished.   Neurological: She is alert and oriented to person, place, and time.   Psychiatric: She has a normal mood and affect. Her behavior is normal. Judgment and thought content normal.       GAIT:     []  Normal  [x]  Antalgic    Assistive device: [x]  None  []  Walker     []  Crutches  []  Cane     []  Wheelchair  []  Stretcher    Right Knee Exam     Tenderness   Right knee tenderness location: diffuse.    Range of Motion   Extension: -5   Flexion: 120     Muscle Strength     The patient has normal right knee strength.    Tests   Drawer:       Anterior - negative    Posterior - negative  Varus: negative  Valgus: negative    Other   Sensation: normal  Pulse: present  Swelling: mild    Comments:  Crepitation on motion.  Mild to moderate pain through arc of motion              Xr Knee 1 Or 2 View Right    Result Date: 1/2/2018  Narrative: AP bilateral standing with lateral of the right knee shows valgus deformity in the right knee with lateral joint space collapse and marginal osteophytes in both medial and lateral compartments.  The left knee " shows tricompartmental osteoarthritic changes that is mild to moderate in nature.  Lateral view of the right knee shows moderate to severe patellofemoral joint arthritic change.  No acute bony abnormality is noted in either knee. 01/02/18 at 5:29 PM by Hieu Lozano MD      Xr Knee Standing Right    Result Date: 1/2/2018  Narrative: AP bilateral standing with lateral of the right knee shows valgus deformity in the right knee with lateral joint space collapse and marginal osteophytes in both medial and lateral compartments. The left knee shows tricompartmental osteoarthritic changes that is mild to moderate in nature. Lateral view of the right knee shows moderate to severe patellofemoral joint arthritic change. No acute bony abnormality is noted in either knee. 01/02/18 at 5:30 PM by Hieu Lozano MD              ASSESSMENT:    Diagnoses and all orders for this visit:    Right knee pain, unspecified chronicity  -     XR Knee 1 or 2 View Right  -     XR Knee Standing Right  -     Large Joint Arthrocentesis    Primary osteoarthritis of both knees  -     Large Joint Arthrocentesis    Type 2 diabetes mellitus without complication, with long-term current use of insulin    Essential hypertension      Large Joint Arthrocentesis  Date/Time: 1/2/2018 2:26 PM  Consent given by: patient  Site marked: site marked  Timeout: Immediately prior to procedure a time out was called to verify the correct patient, procedure, equipment, support staff and site/side marked as required   Supporting Documentation  Indications: pain   Procedure Details  Location: knee - R knee  Preparation: Patient was prepped and draped in the usual sterile fashion  Needle size: 22 G  Approach: anteromedial  Medications administered: 40 mg triamcinolone acetonide 40 MG/ML; 2 mL lidocaine 2%  Patient tolerance: patient tolerated the procedure well with no immediate complications            PLAN    We discussed repeating a steroid injection on  today's date.  We also discussed continuing with strengthening and conditioning exercises.  Slowly progress her activity as tolerated.  We discussed repeating the Visco supplementation injection as she did seem to have a good result with that.    Return for visco-supplementation.    Hieu Lozano MD

## 2018-01-05 RX ORDER — TRIAMCINOLONE ACETONIDE 40 MG/ML
40 INJECTION, SUSPENSION INTRA-ARTICULAR; INTRAMUSCULAR
Status: COMPLETED | OUTPATIENT
Start: 2018-01-02 | End: 2018-01-02

## 2018-01-05 RX ORDER — LIDOCAINE HYDROCHLORIDE 20 MG/ML
2 INJECTION, SOLUTION INFILTRATION; PERINEURAL
Status: COMPLETED | OUTPATIENT
Start: 2018-01-02 | End: 2018-01-02

## 2018-01-08 ENCOUNTER — OFFICE VISIT (OUTPATIENT)
Dept: FAMILY MEDICINE CLINIC | Facility: CLINIC | Age: 83
End: 2018-01-08

## 2018-01-08 VITALS
SYSTOLIC BLOOD PRESSURE: 130 MMHG | BODY MASS INDEX: 43.74 KG/M2 | WEIGHT: 189 LBS | HEIGHT: 55 IN | DIASTOLIC BLOOD PRESSURE: 80 MMHG

## 2018-01-08 DIAGNOSIS — M15.9 OSTEOARTHRITIS OF MULTIPLE JOINTS, UNSPECIFIED OSTEOARTHRITIS TYPE: ICD-10-CM

## 2018-01-08 DIAGNOSIS — F41.9 ANXIETY: Primary | ICD-10-CM

## 2018-01-08 PROCEDURE — 99214 OFFICE O/P EST MOD 30 MIN: CPT | Performed by: FAMILY MEDICINE

## 2018-01-08 RX ORDER — HYDROCODONE BITARTRATE AND ACETAMINOPHEN 7.5; 325 MG/1; MG/1
1 TABLET ORAL
Qty: 60 TABLET | Refills: 0 | Status: SHIPPED | OUTPATIENT
Start: 2018-01-08 | End: 2018-02-09 | Stop reason: SDUPTHER

## 2018-01-08 RX ORDER — LORAZEPAM 1 MG/1
1 TABLET ORAL NIGHTLY PRN
Qty: 30 TABLET | Refills: 2 | Status: SHIPPED | OUTPATIENT
Start: 2018-01-08 | End: 2018-02-09 | Stop reason: SDUPTHER

## 2018-01-08 NOTE — PROGRESS NOTES
Subjective   Nyla Piña is a 82 y.o. female who presents to the office for follow up on back pain, sleep/anxiety issues.  Still has a lot of difficulty sleeping at night since her  passed away.  Would like to continue on lorazepam as this helps her rest and when she gets upset.  Also takes hydrocodone for back pain and joint pain and would like a refill.    History of Present Illness   Back Pain   This is a chronic problem. The current episode started about a month ago. The problem occurs constantly. The problem is unchanged. The pain is present in the lumbar spine. The quality of the pain is described as shooting, stabbing and aching. The pain radiates through the left flank into the left hip area The pain is at a severity of 8/10. The pain is severe. The pain is the same all the time. The symptoms are aggravated by standing, twisting, position, bending and sitting. Stiffness is present at night, all day and in the morning. Associated symptoms include leg pain and tingling. Pertinent negatives include no abdominal pain, bladder incontinence, bowel incontinence, chest pain, dysuria, fever, headaches, numbness, paresis, paresthesias, pelvic pain, perianal numbness, weakness or weight loss. Risk factors include sedentary lifestyle. Patient has tried analgesics, . The treatment provided mild relief.   Insomnia   This is a chronic problem. The current episode started more than 1 year ago after losing her . The problem occurs daily. The problem has been unchanged. Associated symptoms include fatigue. Pertinent negatives include no abdominal pain, anorexia, arthralgias, change in bowel habit, chest pain, chills, congestion, coughing, diaphoresis, fever, headaches, joint swelling, myalgias, nausea, neck pain, numbness, rash, sore throat, swollen glands, urinary symptoms, vertigo, visual change, vomiting or weakness. The symptoms are aggravated by exertion and stress. Treatments tried: OTC sleep aids.  The treatment provided no relief.     The following portions of the patient's history were reviewed and updated as appropriate: allergies, current medications, past family history, past medical history, past social history, past surgical history and problem list.    Review of Systems   Constitutional: Negative for chills.   HENT: Negative.    Respiratory: Negative.  Negative for shortness of breath.    Cardiovascular: Negative.    Gastrointestinal: Negative.  Negative for nausea and vomiting.   Genitourinary: Negative for flank pain, frequency, hematuria and urgency.   Musculoskeletal: Positive for back pain.   Skin: Negative.    Allergic/Immunologic: Negative for immunocompromised state.   Neurological: Negative for dizziness, tremors, seizures and syncope.   Hematological: Negative.    Psychiatric/Behavioral: Positive for sleep disturbance. Negative for agitation, confusion and dysphoric mood. The patient is not nervous/anxious.    All other systems reviewed and are negative.      Objective   Physical Exam   Constitutional: She is oriented to person, place, and time. She appears well-developed and well-nourished.   HENT:   Head: Normocephalic and atraumatic.   Nose: Nose normal.   Mouth/Throat: Oropharynx is clear and moist.   Eyes: Conjunctivae and EOM are normal. Pupils are equal, round, and reactive to light.   Neck: Normal range of motion. Neck supple. No JVD present. No tracheal deviation present. No thyromegaly present.   Cardiovascular: Normal rate, regular rhythm, normal heart sounds and intact distal pulses.    No murmur heard.  Pulmonary/Chest: Effort normal and breath sounds normal. She has no wheezes.   Abdominal: Soft. Bowel sounds are normal. She exhibits no distension. There is no tenderness.   Musculoskeletal: She exhibits tenderness. She exhibits no edema.   Lymphadenopathy:     She has no cervical adenopathy.   Neurological: She is alert and oriented to person, place, and time. Coordination  normal.   Skin: Skin is warm and dry. No rash noted.       Psychiatric: She has a normal mood and affect.   Nursing note and vitals reviewed.      Assessment/Plan   Nyla was seen today for follow-up and med refill.    Diagnoses and all orders for this visit:    Anxiety    Osteoarthritis of multiple joints, unspecified osteoarthritis type    Other orders  -     LORazepam (ATIVAN) 1 MG tablet; Take 1 tablet by mouth At Night As Needed for Anxiety or Sleep.  -     HYDROcodone-acetaminophen (NORCO) 7.5-325 MG per tablet; Take 1 tablet by mouth 2 (Two) Times a Day.  The patient has read and signed the [facility] Controlled Substance Contract.  I will continue to see patient for regular follow up appointments. Patient is well controlled on the medication.  ANGELITO has been reviewed by me and is updated every 3 months. The patient is aware of the potential for addiction and dependence.     Continue lorazepam for anxiety usually at night, somewhat related to bereavement    Continue with hydrocodone for severe pain and arthritis issues.  She is not a candidate for NSAIDs given her age and other health conditions and has been compliant with treatment.          This document has been electronically signed by Chantel Long MD on January 8, 2018 5:11 PM

## 2018-01-22 DIAGNOSIS — E78.5 HYPERLIPIDEMIA, UNSPECIFIED HYPERLIPIDEMIA TYPE: ICD-10-CM

## 2018-01-22 DIAGNOSIS — E55.9 VITAMIN D DEFICIENCY: Primary | ICD-10-CM

## 2018-01-22 DIAGNOSIS — E06.3 HASHIMOTO'S THYROIDITIS: ICD-10-CM

## 2018-01-22 DIAGNOSIS — E11.9 CONTROLLED TYPE 2 DIABETES MELLITUS WITHOUT COMPLICATION, WITH LONG-TERM CURRENT USE OF INSULIN (HCC): ICD-10-CM

## 2018-01-22 DIAGNOSIS — Z79.4 CONTROLLED TYPE 2 DIABETES MELLITUS WITHOUT COMPLICATION, WITH LONG-TERM CURRENT USE OF INSULIN (HCC): ICD-10-CM

## 2018-01-24 LAB
25(OH)D3 SERPL-MCNC: 58.6 NG/ML (ref 30–100)
ALBUMIN SERPL-MCNC: 3.9 G/DL (ref 3.2–5.5)
ALBUMIN/GLOB SERPL: 1.3 G/DL (ref 1–3)
ALP SERPL-CCNC: 58 U/L (ref 15–121)
ALT SERPL W P-5'-P-CCNC: 13 U/L (ref 10–60)
ANION GAP SERPL CALCULATED.3IONS-SCNC: 10 MMOL/L (ref 5–15)
AST SERPL-CCNC: 19 U/L (ref 10–60)
BASOPHILS # BLD AUTO: 0.02 10*3/MM3 (ref 0–0.2)
BASOPHILS NFR BLD AUTO: 0.2 % (ref 0–2)
BILIRUB SERPL-MCNC: 1 MG/DL (ref 0.2–1)
BUN BLD-MCNC: 17 MG/DL (ref 8–25)
BUN/CREAT SERPL: 28.3 (ref 7–25)
CALCIUM SPEC-SCNC: 10 MG/DL (ref 8.4–10.8)
CHLORIDE SERPL-SCNC: 98 MMOL/L (ref 100–112)
CHOLEST SERPL-MCNC: 183 MG/DL (ref 150–200)
CO2 SERPL-SCNC: 27 MMOL/L (ref 20–32)
CREAT BLD-MCNC: 0.6 MG/DL (ref 0.4–1.3)
DEPRECATED RDW RBC AUTO: 43.4 FL (ref 36.4–46.3)
EOSINOPHIL # BLD AUTO: 0.38 10*3/MM3 (ref 0–0.7)
EOSINOPHIL NFR BLD AUTO: 4 % (ref 0–7)
ERYTHROCYTE [DISTWIDTH] IN BLOOD BY AUTOMATED COUNT: 13.5 % (ref 11.5–14.5)
GFR SERPL CREATININE-BSD FRML MDRD: 96 ML/MIN/1.73 (ref 39–90)
GLOBULIN UR ELPH-MCNC: 3 GM/DL (ref 2.5–4.6)
GLUCOSE BLD-MCNC: 147 MG/DL (ref 70–100)
HBA1C MFR BLD: 7.1 % (ref 4–5.6)
HCT VFR BLD AUTO: 38.6 % (ref 35–45)
HDLC SERPL-MCNC: 75 MG/DL (ref 35–100)
HGB BLD-MCNC: 12.8 G/DL (ref 12–15.5)
LDLC SERPL CALC-MCNC: 90 MG/DL
LDLC/HDLC SERPL: 1.19 {RATIO}
LYMPHOCYTES # BLD AUTO: 3.6 10*3/MM3 (ref 0.6–4.2)
LYMPHOCYTES NFR BLD AUTO: 38.3 % (ref 10–50)
MCH RBC QN AUTO: 30 PG (ref 26.5–34)
MCHC RBC AUTO-ENTMCNC: 33.2 G/DL (ref 31.4–36)
MCV RBC AUTO: 90.4 FL (ref 80–98)
MONOCYTES # BLD AUTO: 0.88 10*3/MM3 (ref 0–0.9)
MONOCYTES NFR BLD AUTO: 9.4 % (ref 0–12)
NEUTROPHILS # BLD AUTO: 4.51 10*3/MM3 (ref 2–8.6)
NEUTROPHILS NFR BLD AUTO: 48.1 % (ref 37–80)
PLATELET # BLD AUTO: 283 10*3/MM3 (ref 150–450)
PMV BLD AUTO: 8.5 FL (ref 8–12)
POTASSIUM BLD-SCNC: 4.4 MMOL/L (ref 3.4–5.4)
PROT SERPL-MCNC: 6.9 G/DL (ref 6.7–8.2)
RBC # BLD AUTO: 4.27 10*6/MM3 (ref 3.77–5.16)
SODIUM BLD-SCNC: 135 MMOL/L (ref 134–146)
TRIGL SERPL-MCNC: 92 MG/DL (ref 35–160)
TSH SERPL DL<=0.05 MIU/L-ACNC: 1.13 MIU/ML (ref 0.46–4.68)
VLDLC SERPL-MCNC: 18.4 MG/DL
WBC NRBC COR # BLD: 9.39 10*3/MM3 (ref 3.2–9.8)

## 2018-01-24 PROCEDURE — 80053 COMPREHEN METABOLIC PANEL: CPT | Performed by: NURSE PRACTITIONER

## 2018-01-24 PROCEDURE — 36415 COLL VENOUS BLD VENIPUNCTURE: CPT | Performed by: NURSE PRACTITIONER

## 2018-01-24 PROCEDURE — 83036 HEMOGLOBIN GLYCOSYLATED A1C: CPT | Performed by: NURSE PRACTITIONER

## 2018-01-24 PROCEDURE — 80061 LIPID PANEL: CPT | Performed by: NURSE PRACTITIONER

## 2018-01-24 PROCEDURE — 82306 VITAMIN D 25 HYDROXY: CPT | Performed by: NURSE PRACTITIONER

## 2018-01-24 PROCEDURE — 84443 ASSAY THYROID STIM HORMONE: CPT | Performed by: NURSE PRACTITIONER

## 2018-01-24 PROCEDURE — 85025 COMPLETE CBC W/AUTO DIFF WBC: CPT | Performed by: NURSE PRACTITIONER

## 2018-01-25 ENCOUNTER — OFFICE VISIT (OUTPATIENT)
Dept: ENDOCRINOLOGY | Facility: CLINIC | Age: 83
End: 2018-01-25

## 2018-01-25 VITALS
BODY MASS INDEX: 29.73 KG/M2 | HEIGHT: 66 IN | DIASTOLIC BLOOD PRESSURE: 62 MMHG | HEART RATE: 112 BPM | SYSTOLIC BLOOD PRESSURE: 106 MMHG | WEIGHT: 185 LBS

## 2018-01-25 DIAGNOSIS — E78.2 MIXED HYPERLIPIDEMIA: ICD-10-CM

## 2018-01-25 DIAGNOSIS — E11.42 DIABETIC POLYNEUROPATHY ASSOCIATED WITH TYPE 2 DIABETES MELLITUS (HCC): ICD-10-CM

## 2018-01-25 DIAGNOSIS — Z79.4 TYPE 2 DIABETES MELLITUS WITHOUT COMPLICATION, WITH LONG-TERM CURRENT USE OF INSULIN (HCC): Primary | ICD-10-CM

## 2018-01-25 DIAGNOSIS — E11.9 TYPE 2 DIABETES MELLITUS WITHOUT COMPLICATION, WITH LONG-TERM CURRENT USE OF INSULIN (HCC): Primary | ICD-10-CM

## 2018-01-25 DIAGNOSIS — I10 ESSENTIAL HYPERTENSION: ICD-10-CM

## 2018-01-25 DIAGNOSIS — E55.9 VITAMIN D DEFICIENCY: ICD-10-CM

## 2018-01-25 DIAGNOSIS — E06.3 HASHIMOTO'S THYROIDITIS: ICD-10-CM

## 2018-01-25 PROCEDURE — 99214 OFFICE O/P EST MOD 30 MIN: CPT | Performed by: NURSE PRACTITIONER

## 2018-01-25 NOTE — PROGRESS NOTES
Subjective    Nyla Piña is a 82 y.o. female. she is here today for follow-up.    History of Present Illness  Duration/Timing:Diabetes mellitus type 2, Age at onset of diabetes : 58 years, Onset of symptoms gradual         Timing constant      quality near control      Severity Complications        Severity (Complications/Hospitalizations)  Secondary Macrovascular Complications: No CAD, No CVA, No PAD  Secondary Microvascular Complications: No Diabetic Nephropathy, Microalbuminuria, No proteinuria, No Diabetic Retinopathy, Diabetic Neuropathy     Context  Diabetes Regimen: Insulin, oral medication,     Lab Results   Component Value Date    HGBA1C 7.1 (H) 01/24/2018             Blood Glucose Readings      Several at goal      Lowest 85      Diet      Low carb      Exercise:Does not exercise     Associated Signs/Symptoms  Hyperglycemic Symptoms:Polyruria, polydipsia, polyphagia, No blurred vision, No weight gain  Hypoglycemic Episodes:No documented hypoglycemia                      The following portions of the patient's history were reviewed and updated as appropriate:   Past Medical History:   Diagnosis Date   • Acute bronchitis    • Acute sinusitis    • Acute upper respiratory infection    • Anxiety    • Cellulitis of lower leg    • Chronic urinary tract infection    • Cough    • Death of     • Diabetic asymmetric polyneuropathy    • Diabetic peripheral neuropathy    • Diabetic polyneuropathy    • Disease of nail    • Dorsalgia    • Flank pain    • Hashimoto's thyroiditis    • History of echocardiogram 02/19/2013   • Hypertensive disorder    • Insomnia    • Mixed hyperlipidemia    • Non-healing surgical wound    • Osteoarthritis of multiple joints    • Peripheral vascular disease    • Seasonal allergic rhinitis    • Type II diabetes mellitus, uncontrolled    • Upper respiratory infection    • Urinary tract infectious disease    • Urinary tract infectious disease    • Vitamin D deficiency      Past  Surgical History:   Procedure Laterality Date   • EXCISION MASS LEG Left 2013    Excision of soft tissue mass of left leg   • HYSTERECTOMY     • STEROID INJECTION  2016    Depo Medrol (Methylprednisone) 80mg (Acute upper respiratory infection, unspecified)      No family history on file.  OB History      Para Term  AB Living    2 2 2       SAB TAB Ectopic Multiple Live Births                Current Outpatient Prescriptions   Medication Sig Dispense Refill   • aspirin 81 MG tablet Take 81 mg by mouth daily.     • B-D ULTRAFINE III SHORT PEN 31G X 8 MM misc      • Calcium Carbonate (CALCIUM 600 PO) Take 2 tablets by mouth daily.     • diclofenac (VOLTAREN) 1 % gel gel Apply 4 g topically 4 (Four) Times a Day. 100 g 11   • DULoxetine (CYMBALTA) 60 MG capsule Take 1 capsule by mouth Daily. 90 capsule 1   • EASY TOUCH LANCETS 28G/TWIST misc TEST FOUR TIMES A  each 11   • ergocalciferol (ERGOCALCIFEROL) 20151 UNITS capsule Take 1 capsule by mouth 1 (One) Time Per Week. 12 capsule 3   • fluticasone (FLONASE) 50 MCG/ACT nasal spray 1 spray into each nostril 2 (two) times a day.     • gabapentin (NEURONTIN) 300 MG capsule 1 capsule TID 90 capsule 0   • HYDROcodone-acetaminophen (NORCO) 7.5-325 MG per tablet Take 1 tablet by mouth 2 (Two) Times a Day. 60 tablet 0   • insulin aspart (novoLOG FLEXPEN) 100 UNIT/ML solution pen-injector sc pen Inject 2-8 Units under the skin 3 (Three) Times a Day With Meals. INJECT 2-8 UNITS THREE TIMES A DAY BEFORE MEALS 15 pen 3   • LANTUS SOLOSTAR 100 UNIT/ML injection pen INJECT SUBCUTANEOUSLY 15   UNITS EVERY NIGHT 15 mL 3   • levothyroxine (SYNTHROID, LEVOTHROID) 100 MCG tablet TAKE 1 TABLET DAILY 90 tablet 1   • lisinopril (PRINIVIL,ZESTRIL) 20 MG tablet TAKE ONE TABLET BY MOUTH DAILY 90 tablet 3   • LORazepam (ATIVAN) 1 MG tablet Take 1 tablet by mouth At Night As Needed for Anxiety or Sleep. 30 tablet 2   • metFORMIN (GLUCOPHAGE) 850 MG tablet TAKE 1  "TABLET TWICE A DAY  WITH MEALS 180 tablet 1   • montelukast (SINGULAIR) 10 MG tablet Take 1 tablet by mouth Every Night for 180 days. 30 tablet 5   • Needle, Disp, 31G X 5/16\" misc 1 each 4 (Four) Times a Day. Pen Needle; 400 each 3   • nitrofurantoin (MACRODANTIN) 100 MG capsule 1 capsule twice a day 20 capsule 0   • simvastatin (ZOCOR) 20 MG tablet 1 TABLET(S) BY MOUTH AT BEDTIME 90 tablet 3   • traZODone (DESYREL) 50 MG tablet TAKE 1 TO 2 TABLETS NIGHTLYAT BEDTIME 180 tablet 0   • TRUE METRIX BLOOD GLUCOSE TEST test strip USE TO TEST FOUR TIMES DAILY 150 each 11     No current facility-administered medications for this visit.      Allergies   Allergen Reactions   • Penicillins Swelling and Rash     Social History     Social History   • Marital status:      Spouse name: N/A   • Number of children: N/A   • Years of education: N/A     Social History Main Topics   • Smoking status: Never Smoker   • Smokeless tobacco: Never Used   • Alcohol use No   • Drug use: No   • Sexual activity: Defer     Other Topics Concern   • None     Social History Narrative       Review of Systems  Review of Systems   Constitutional: Negative for activity change, appetite change, diaphoresis and fatigue.   HENT: Negative for congestion, dental problem, facial swelling, sneezing, sore throat, tinnitus, trouble swallowing and voice change.    Eyes: Negative for photophobia, pain, discharge, redness, itching and visual disturbance.   Respiratory: Negative for apnea, cough, choking, chest tightness and shortness of breath.    Cardiovascular: Negative for chest pain, palpitations and leg swelling.   Gastrointestinal: Negative for abdominal distention, abdominal pain, constipation, diarrhea, nausea and vomiting.   Endocrine: Negative for cold intolerance, heat intolerance, polydipsia, polyphagia and polyuria.   Genitourinary: Negative for difficulty urinating, dysuria, frequency, hematuria and urgency.   Musculoskeletal: Negative for " "arthralgias, back pain, gait problem, joint swelling, myalgias, neck pain and neck stiffness.   Skin: Negative for color change, pallor, rash and wound.   Allergic/Immunologic: Negative for environmental allergies.   Neurological: Negative for dizziness, tremors, weakness, light-headedness, numbness and headaches.   Hematological: Negative for adenopathy. Does not bruise/bleed easily.   Psychiatric/Behavioral: Negative for agitation, behavioral problems, confusion and sleep disturbance.        Objective    /62 (BP Location: Right arm, Patient Position: Sitting, Cuff Size: Adult)  Pulse 112  Ht 167.6 cm (66\")  Wt 83.9 kg (185 lb)  BMI 29.86 kg/m2  Physical Exam   Constitutional: She is oriented to person, place, and time. She appears well-developed and well-nourished. No distress.   HENT:   Head: Normocephalic and atraumatic.   Right Ear: External ear normal.   Left Ear: External ear normal.   Nose: Nose normal.   Eyes: Conjunctivae and EOM are normal. Pupils are equal, round, and reactive to light.   Neck: Normal range of motion. Neck supple. No tracheal deviation present. No thyromegaly present.   Cardiovascular: Normal rate, regular rhythm and normal heart sounds.    No murmur heard.  Pulmonary/Chest: Effort normal and breath sounds normal. No respiratory distress. She has no wheezes.   Abdominal: Soft. Bowel sounds are normal. There is no tenderness. There is no rebound and no guarding.   Musculoskeletal: Normal range of motion. She exhibits no edema, tenderness or deformity.   Neurological: She is alert and oriented to person, place, and time. No cranial nerve deficit.   Skin: Skin is warm and dry. No rash noted.   Psychiatric: She has a normal mood and affect. Her behavior is normal. Judgment and thought content normal.       Lab Review  Glucose (mg/dL)   Date Value   01/24/2018 147 (H)   07/19/2017 186 (H)   02/24/2017 173 (H)     Sodium (mmol/L)   Date Value   01/24/2018 135   07/19/2017 133 (L) "   02/24/2017 137     Potassium (mmol/L)   Date Value   01/24/2018 4.4   07/19/2017 4.4   02/24/2017 4.3     Chloride (mmol/L)   Date Value   01/24/2018 98 (L)   07/19/2017 96 (L)   02/24/2017 100     CO2 (mmol/L)   Date Value   01/24/2018 27.0   07/19/2017 25.0   02/24/2017 25.0     BUN (mg/dL)   Date Value   01/24/2018 17   07/19/2017 12   02/24/2017 15     Creatinine (mg/dL)   Date Value   01/24/2018 0.60   07/19/2017 0.70   02/24/2017 0.70     Hemoglobin A1C (%)   Date Value   01/24/2018 7.1 (H)   07/19/2017 7.20 (H)   02/24/2017 7.29 (H)     Triglycerides   Date Value   01/24/2018 92 mg/dL   07/20/2016 143 mg/dl   07/20/2016 147 mg/dl   01/04/2016 105 mg/dl       Assessment/Plan      1. Type 2 diabetes mellitus without complication, with long-term current use of insulin    2. Hashimoto's thyroiditis    3. Diabetic polyneuropathy associated with type 2 diabetes mellitus    4. Essential hypertension    5. Mixed hyperlipidemia    6. Vitamin D deficiency    .    Medications prescribed:  Outpatient Encounter Prescriptions as of 1/25/2018   Medication Sig Dispense Refill   • aspirin 81 MG tablet Take 81 mg by mouth daily.     • B-D ULTRAFINE III SHORT PEN 31G X 8 MM misc      • Calcium Carbonate (CALCIUM 600 PO) Take 2 tablets by mouth daily.     • diclofenac (VOLTAREN) 1 % gel gel Apply 4 g topically 4 (Four) Times a Day. 100 g 11   • DULoxetine (CYMBALTA) 60 MG capsule Take 1 capsule by mouth Daily. 90 capsule 1   • EASY TOUCH LANCETS 28G/TWIST misc TEST FOUR TIMES A  each 11   • ergocalciferol (ERGOCALCIFEROL) 36267 UNITS capsule Take 1 capsule by mouth 1 (One) Time Per Week. 12 capsule 3   • fluticasone (FLONASE) 50 MCG/ACT nasal spray 1 spray into each nostril 2 (two) times a day.     • gabapentin (NEURONTIN) 300 MG capsule 1 capsule TID 90 capsule 0   • HYDROcodone-acetaminophen (NORCO) 7.5-325 MG per tablet Take 1 tablet by mouth 2 (Two) Times a Day. 60 tablet 0   • insulin aspart (novoLOG FLEXPEN) 100  "UNIT/ML solution pen-injector sc pen Inject 2-8 Units under the skin 3 (Three) Times a Day With Meals. INJECT 2-8 UNITS THREE TIMES A DAY BEFORE MEALS 15 pen 3   • LANTUS SOLOSTAR 100 UNIT/ML injection pen INJECT SUBCUTANEOUSLY 15   UNITS EVERY NIGHT 15 mL 3   • levothyroxine (SYNTHROID, LEVOTHROID) 100 MCG tablet TAKE 1 TABLET DAILY 90 tablet 1   • lisinopril (PRINIVIL,ZESTRIL) 20 MG tablet TAKE ONE TABLET BY MOUTH DAILY 90 tablet 3   • LORazepam (ATIVAN) 1 MG tablet Take 1 tablet by mouth At Night As Needed for Anxiety or Sleep. 30 tablet 2   • metFORMIN (GLUCOPHAGE) 850 MG tablet TAKE 1 TABLET TWICE A DAY  WITH MEALS 180 tablet 1   • montelukast (SINGULAIR) 10 MG tablet Take 1 tablet by mouth Every Night for 180 days. 30 tablet 5   • Needle, Disp, 31G X 5/16\" misc 1 each 4 (Four) Times a Day. Pen Needle; 400 each 3   • nitrofurantoin (MACRODANTIN) 100 MG capsule 1 capsule twice a day 20 capsule 0   • simvastatin (ZOCOR) 20 MG tablet 1 TABLET(S) BY MOUTH AT BEDTIME 90 tablet 3   • traZODone (DESYREL) 50 MG tablet TAKE 1 TO 2 TABLETS NIGHTLYAT BEDTIME 180 tablet 0   • TRUE METRIX BLOOD GLUCOSE TEST test strip USE TO TEST FOUR TIMES DAILY 150 each 11   • [DISCONTINUED] DULoxetine (CYMBALTA) 60 MG capsule TAKE 1 CAPSULE DAILY 90 capsule 0   • [DISCONTINUED] levoFLOXacin (LEVAQUIN) 250 MG tablet Take 1 tablet by mouth Daily. 7 tablet 0   • [DISCONTINUED] levothyroxine (SYNTHROID, LEVOTHROID) 100 MCG tablet TAKE 1 TABLET DAILY AS     DIRECTED 90 tablet 0   • [DISCONTINUED] metFORMIN (GLUCOPHAGE) 850 MG tablet TAKE 1 TABLET TWICE A  tablet 0   • [DISCONTINUED] NOVOLOG FLEXPEN 100 UNIT/ML solution pen-injector sc pen INJECT 2-8 UNITS           SUBCUTANEOUSLY WITH MEALS 30 mL 2     No facility-administered encounter medications on file as of 1/25/2018.        Orders placed during this encounter include:  Orders Placed This Encounter   Procedures   • Comprehensive Metabolic Panel   • Hemoglobin A1c   • TSH   • " Vitamin D 25 Hydroxy   • CBC & Differential     Order Specific Question:   Manual Differential     Answer:   No         Glycemic Management        Lab Results   Component Value Date    HGBA1C 7.1 (H) 01/24/2018          Novolog mix stopped   Januvia stopped it due to headaches  Glimepiride stopped caused higher sugars         Metformin 850 mg one tablet po BID         Lantus 15 units      Novolog for meals      Breakfast -- 7     Lunch -- 8     Supper -- 9              Lipid Management      Simvastatin 20 mg one at bedtime      Lipid at goal     Total Cholesterol   Date Value Ref Range Status   01/24/2018 183 150 - 200 mg/dL Final     Triglycerides   Date Value Ref Range Status   01/24/2018 92 35 - 160 mg/dL Final     HDL Cholesterol   Date Value Ref Range Status   01/24/2018 75 35 - 100 mg/dL Final           Blood Pressure Management      Lisinopril 20 mg daily     Microvascular Complication Monitoring      Cymbalta 60 mg one daily      Gabapentin 300 mg one TID        hydrocodone-acetaminophen 7.5- 500 mg 2 times daily      3- 15 Microalbuminuria            Bone Health      h o vitamin d def      vitamin d 50,000 units weekly      Feb. 2017      Vitamin d - nl      July 2017      Vitamin d - 46     Continue supplement         Immunization: influenza vaccine Oct. 2017 , shingles vaccine Oct. 2016         Other Diabetes Related Aspects         Hypothyroidism           Levothyroxine 100 mcg one daily Monday through Friday and 1/2 tablet on Saturday and none on Sunday Nov. 2016 TSH - nl     Feb. TSH - nl            Lab Results   Component Value Date     TSH 1.670 07/19/2017      Lab Results   Component Value Date    TSH 1.130 01/24/2018            4. Follow-up: Return in about 6 months (around 7/25/2018) for Recheck.

## 2018-01-29 PROCEDURE — 82570 ASSAY OF URINE CREATININE: CPT | Performed by: NURSE PRACTITIONER

## 2018-01-29 PROCEDURE — 84156 ASSAY OF PROTEIN URINE: CPT | Performed by: NURSE PRACTITIONER

## 2018-01-30 LAB
CREAT UR-MCNC: 21.4 MG/DL
PROT UR-MCNC: 11.9 MG/DL
PROT/CREAT UR: 556.1 MG/G CREA (ref 0–200)

## 2018-02-06 ENCOUNTER — CLINICAL SUPPORT (OUTPATIENT)
Dept: ORTHOPEDIC SURGERY | Facility: CLINIC | Age: 83
End: 2018-02-06

## 2018-02-06 DIAGNOSIS — M17.11 PRIMARY OSTEOARTHRITIS OF RIGHT KNEE: Primary | ICD-10-CM

## 2018-02-06 PROCEDURE — 20610 DRAIN/INJ JOINT/BURSA W/O US: CPT | Performed by: ORTHOPAEDIC SURGERY

## 2018-02-06 NOTE — PROGRESS NOTES
Knee Joint Injection      Patient: Nyla Piña    YOB: 1935    Chief Complaint   Patient presents with   • Right Knee - Follow-up       History of Present Illness:  Patient is here for SYNVISC ONE- RT knee. Patient denies any change since her last office visit.     Physical Exam: 82 y.o. female  General Appearance:    Alert, cooperative, in no acute distress                   Vitals:    02/06/18 1301   PainSc: Comment: right   PainLoc: Knee        Patient is alert and oriented, ×3 no acute distress.  Affect is normal respiratory rate is normal unlabored.  Exam and complaints are unchanged.    Procedure:  Large Joint Arthrocentesis  Date/Time: 2/6/2018 1:02 PM  Consent given by: patient  Timeout: Immediately prior to procedure a time out was called to verify the correct patient, procedure, equipment, support staff and site/side marked as required   Supporting Documentation  Indications: pain   Procedure Details  Location: knee - R knee  Needle size: 22 G  Medications administered: 48 mg hylan 48 MG/6ML  Patient tolerance: patient tolerated the procedure well with no immediate complications          Assessment:    Diagnoses and all orders for this visit:    Primary osteoarthritis of right knee  -     Large Joint Arthrocentesis        Plan:   Slowly increase activity as tolerated.  Discussed importance of leg strengthening and general conditioning.  Discussed warning signs of injection.  Discussed that purpose of injections was symptom improvement and improved activity.  Also discussed that further treatment options depended on symptoms at followup and length of time of improvement after injections.    Return if symptoms worsen or fail to improve, for recheck.    Hieu Lozano MD

## 2018-02-09 RX ORDER — HYDROCODONE BITARTRATE AND ACETAMINOPHEN 7.5; 325 MG/1; MG/1
1 TABLET ORAL
Qty: 60 TABLET | Refills: 0 | Status: SHIPPED | OUTPATIENT
Start: 2018-02-09 | End: 2018-03-13 | Stop reason: SDUPTHER

## 2018-02-09 RX ORDER — LORAZEPAM 1 MG/1
1 TABLET ORAL NIGHTLY PRN
Qty: 30 TABLET | Refills: 2 | Status: SHIPPED | OUTPATIENT
Start: 2018-02-09 | End: 2018-03-13 | Stop reason: SDUPTHER

## 2018-02-13 RX ORDER — GABAPENTIN 300 MG/1
CAPSULE ORAL
Qty: 90 CAPSULE | Refills: 0 | Status: SHIPPED | OUTPATIENT
Start: 2018-02-13 | End: 2018-03-13 | Stop reason: SDUPTHER

## 2018-03-13 RX ORDER — HYDROCODONE BITARTRATE AND ACETAMINOPHEN 7.5; 325 MG/1; MG/1
1 TABLET ORAL
Qty: 60 TABLET | Refills: 0 | Status: SHIPPED | OUTPATIENT
Start: 2018-03-13 | End: 2018-05-23 | Stop reason: SDUPTHER

## 2018-03-13 RX ORDER — GABAPENTIN 300 MG/1
CAPSULE ORAL
Qty: 90 CAPSULE | Refills: 0 | Status: SHIPPED | OUTPATIENT
Start: 2018-03-13 | End: 2018-05-23 | Stop reason: SDUPTHER

## 2018-03-13 RX ORDER — LORAZEPAM 1 MG/1
1 TABLET ORAL NIGHTLY PRN
Qty: 30 TABLET | Refills: 2 | Status: SHIPPED | OUTPATIENT
Start: 2018-03-13 | End: 2018-05-23 | Stop reason: SDUPTHER

## 2018-03-14 RX ORDER — LISINOPRIL 20 MG/1
20 TABLET ORAL DAILY
Qty: 90 TABLET | Refills: 3 | Status: SHIPPED | OUTPATIENT
Start: 2018-03-14 | End: 2018-05-14

## 2018-03-19 RX ORDER — PEN NEEDLE, DIABETIC 31 GX5/16"
NEEDLE, DISPOSABLE MISCELLANEOUS
Qty: 200 EACH | Refills: 5 | Status: SHIPPED | OUTPATIENT
Start: 2018-03-19 | End: 2019-03-25 | Stop reason: SDUPTHER

## 2018-03-26 DIAGNOSIS — R30.0 DYSURIA: Primary | ICD-10-CM

## 2018-03-27 ENCOUNTER — LAB (OUTPATIENT)
Dept: LAB | Facility: OTHER | Age: 83
End: 2018-03-27

## 2018-03-27 DIAGNOSIS — R30.0 DYSURIA: ICD-10-CM

## 2018-03-27 LAB
BACTERIA UR QL AUTO: ABNORMAL /HPF
BILIRUB UR QL STRIP: NEGATIVE
CLARITY UR: CLEAR
COLOR UR: YELLOW
GLUCOSE UR STRIP-MCNC: NEGATIVE MG/DL
HGB UR QL STRIP.AUTO: NEGATIVE
HYALINE CASTS UR QL AUTO: ABNORMAL /LPF
KETONES UR QL STRIP: NEGATIVE
LEUKOCYTE ESTERASE UR QL STRIP.AUTO: ABNORMAL
NITRITE UR QL STRIP: NEGATIVE
PH UR STRIP.AUTO: 6 [PH] (ref 5.5–8)
PROT UR QL STRIP: NEGATIVE
RBC # UR: ABNORMAL /HPF
REF LAB TEST METHOD: ABNORMAL
SP GR UR STRIP: 1.01 (ref 1–1.03)
SQUAMOUS #/AREA URNS HPF: ABNORMAL /HPF
UROBILINOGEN UR QL STRIP: ABNORMAL
WBC UR QL AUTO: ABNORMAL /HPF

## 2018-03-27 PROCEDURE — 87086 URINE CULTURE/COLONY COUNT: CPT | Performed by: FAMILY MEDICINE

## 2018-03-27 PROCEDURE — 81001 URINALYSIS AUTO W/SCOPE: CPT | Performed by: FAMILY MEDICINE

## 2018-03-27 RX ORDER — SULFAMETHOXAZOLE AND TRIMETHOPRIM 800; 160 MG/1; MG/1
1 TABLET ORAL 2 TIMES DAILY
Qty: 20 TABLET | Refills: 0 | Status: SHIPPED | OUTPATIENT
Start: 2018-03-27 | End: 2018-04-09

## 2018-03-29 LAB — BACTERIA SPEC AEROBE CULT: NORMAL

## 2018-04-09 RX ORDER — LEVOFLOXACIN 250 MG/1
250 TABLET ORAL DAILY
Qty: 5 TABLET | Refills: 0 | Status: SHIPPED | OUTPATIENT
Start: 2018-04-09 | End: 2018-07-25

## 2018-04-17 ENCOUNTER — OFFICE VISIT (OUTPATIENT)
Dept: FAMILY MEDICINE CLINIC | Facility: CLINIC | Age: 83
End: 2018-04-17

## 2018-04-17 VITALS
WEIGHT: 185 LBS | BODY MASS INDEX: 29.73 KG/M2 | DIASTOLIC BLOOD PRESSURE: 70 MMHG | HEIGHT: 66 IN | SYSTOLIC BLOOD PRESSURE: 122 MMHG

## 2018-04-17 DIAGNOSIS — I10 ESSENTIAL HYPERTENSION, MALIGNANT: ICD-10-CM

## 2018-04-17 DIAGNOSIS — R00.0 TACHYCARDIA: Primary | ICD-10-CM

## 2018-04-17 DIAGNOSIS — R01.1 HOLOSYSTOLIC MURMUR: ICD-10-CM

## 2018-04-17 PROCEDURE — 99214 OFFICE O/P EST MOD 30 MIN: CPT | Performed by: FAMILY MEDICINE

## 2018-04-17 PROCEDURE — 93000 ELECTROCARDIOGRAM COMPLETE: CPT | Performed by: FAMILY MEDICINE

## 2018-04-17 RX ORDER — MONTELUKAST SODIUM 10 MG/1
10 TABLET ORAL NIGHTLY
Qty: 30 TABLET | Refills: 5 | Status: SHIPPED | OUTPATIENT
Start: 2018-04-17 | End: 2019-01-16 | Stop reason: SDUPTHER

## 2018-04-17 RX ORDER — FLUTICASONE PROPIONATE 50 MCG
SPRAY, SUSPENSION (ML) NASAL
Qty: 16 G | Refills: 5 | Status: ON HOLD | OUTPATIENT
Start: 2018-04-17 | End: 2020-03-07

## 2018-04-17 NOTE — PROGRESS NOTES
Subjective   Nyla Piña is a 82 y.o. female who presents to the office for several concerns.  She feels weak and dizzy and her legs feel like jello. She is falling and has to hold furniture or use a cane to walk.  She does have an extensive lab workup which didn't reveal a source for the weakness.    Fatigue   This is a chronic problem. The current episode started more than 1 month ago. The problem occurs constantly. The problem has been gradually worsening. Associated symptoms include arthralgias and fatigue. Pertinent negatives include no chills, nausea or vomiting. The symptoms are aggravated by exertion. She has tried rest for the symptoms. The treatment provided no relief.      The following portions of the patient's history were reviewed and updated as appropriate: allergies, current medications, past family history, past medical history, past social history, past surgical history and problem list.    Review of Systems   Constitutional: Positive for fatigue. Negative for chills.   HENT: Negative.    Respiratory: Negative.  Negative for shortness of breath.    Cardiovascular: Negative.    Gastrointestinal: Negative.  Negative for nausea and vomiting.   Genitourinary: Negative for flank pain, frequency, hematuria and urgency.   Musculoskeletal: Positive for arthralgias and gait problem.   Skin: Negative.    Allergic/Immunologic: Negative for immunocompromised state.   Neurological: Negative for dizziness, tremors, seizures and syncope.   Hematological: Negative.    Psychiatric/Behavioral: Positive for sleep disturbance. Negative for agitation, confusion and dysphoric mood. The patient is not nervous/anxious.    All other systems reviewed and are negative.    Objective   Physical Exam   Constitutional: She is oriented to person, place, and time. She appears well-developed and well-nourished.   HENT:   Head: Normocephalic and atraumatic.   Nose: Nose normal.   Mouth/Throat: Oropharynx is clear and moist.    Eyes: Conjunctivae and EOM are normal. Pupils are equal, round, and reactive to light.   Neck: Normal range of motion. Neck supple. No JVD present. No tracheal deviation present. No thyromegaly present.   Cardiovascular: Regular rhythm and intact distal pulses.    Murmur heard.  2/6 holosystolic murmur is noted today, rate 110   Pulmonary/Chest: Effort normal and breath sounds normal. She has no wheezes.   Abdominal: Soft. Bowel sounds are normal. She exhibits no distension. There is no tenderness.   Musculoskeletal: She exhibits tenderness. She exhibits no edema.   Lymphadenopathy:     She has no cervical adenopathy.   Neurological: She is alert and oriented to person, place, and time. Coordination normal.   Skin: Skin is warm and dry. No rash noted.       Psychiatric: She has a normal mood and affect.   Nursing note and vitals reviewed.      Assessment/Plan   Nyla was seen today for fatigue and back pain.    Diagnoses and all orders for this visit:    Tachycardia  -     Ambulatory Referral to Cardiology  -     Holter monitor - 24 hour; Future    Holosystolic murmur  -     Ambulatory Referral to Cardiology  -     Adult Transthoracic Echo Complete W/ Cont if Necessary Per Protocol; Future    Essential hypertension, malignant  -     Cancel: BNP    Suspect that the patient may be having an arrhythmia or valvular related issue given the new murmur.  Patient has says she has never been told before that she has a murmur.  She is not symptomatic with the tachycardia but certainly has a fast heart rate today.  EKG is done today showing a rate of 104, KY interval 190, QRS duration 68, sinus tachycardia with a left axis deviation.  No prior EKG for comparison    We'll get an echo, a 24 hour Holter, and refer to cardiology and advised her not to do any vigorous activity until cardiac workup is completed          This document has been electronically signed by Chantel Long MD on April 17, 2018 1:25 PM

## 2018-04-18 ENCOUNTER — DOCUMENTATION (OUTPATIENT)
Dept: CARDIOLOGY | Facility: CLINIC | Age: 83
End: 2018-04-18

## 2018-04-18 ENCOUNTER — OFFICE VISIT (OUTPATIENT)
Dept: CARDIOLOGY | Facility: CLINIC | Age: 83
End: 2018-04-18

## 2018-04-18 VITALS
SYSTOLIC BLOOD PRESSURE: 118 MMHG | DIASTOLIC BLOOD PRESSURE: 70 MMHG | WEIGHT: 185 LBS | HEIGHT: 66 IN | HEART RATE: 75 BPM | BODY MASS INDEX: 29.73 KG/M2

## 2018-04-18 DIAGNOSIS — Z79.4 TYPE 2 DIABETES MELLITUS WITHOUT COMPLICATION, WITH LONG-TERM CURRENT USE OF INSULIN (HCC): Primary | ICD-10-CM

## 2018-04-18 DIAGNOSIS — E11.9 TYPE 2 DIABETES MELLITUS WITHOUT COMPLICATION, WITH LONG-TERM CURRENT USE OF INSULIN (HCC): Primary | ICD-10-CM

## 2018-04-18 DIAGNOSIS — R06.00 DYSPNEA, UNSPECIFIED TYPE: ICD-10-CM

## 2018-04-18 DIAGNOSIS — E78.2 MIXED HYPERLIPIDEMIA: ICD-10-CM

## 2018-04-18 DIAGNOSIS — R00.0 TACHYCARDIA: Primary | ICD-10-CM

## 2018-04-18 DIAGNOSIS — I10 ESSENTIAL HYPERTENSION: ICD-10-CM

## 2018-04-18 DIAGNOSIS — R00.0 TACHYCARDIA: ICD-10-CM

## 2018-04-18 PROCEDURE — 99204 OFFICE O/P NEW MOD 45 MIN: CPT | Performed by: INTERNAL MEDICINE

## 2018-04-18 RX ORDER — METOPROLOL SUCCINATE 25 MG/1
25 TABLET, EXTENDED RELEASE ORAL DAILY
Qty: 30 TABLET | Refills: 6 | Status: SHIPPED | OUTPATIENT
Start: 2018-04-18 | End: 2018-04-18 | Stop reason: SDUPTHER

## 2018-04-18 RX ORDER — METOPROLOL SUCCINATE 25 MG/1
25 TABLET, EXTENDED RELEASE ORAL DAILY
Qty: 30 TABLET | Refills: 6 | Status: SHIPPED | OUTPATIENT
Start: 2018-04-18 | End: 2018-05-11 | Stop reason: ALTCHOICE

## 2018-04-18 NOTE — PROGRESS NOTES
Nyal Piña  82 y.o. female    04/18/2018  1. Type 2 diabetes mellitus without complication, with long-term current use of insulin    2. Essential hypertension    3. Tachycardia    4. Dyspnea, unspecified type    5. Mixed hyperlipidemia        History of Present Illness  82 years old patient physically active with long-standing history of diabetes on insulin regimen, hypothyroidism on hormone replacement, hyperlipidemia, chronic back pain, peripheral neuropathy who referred for evaluations of cardiac murmur, increasing fatigability, mild dyspnea on exertion and noted to have sinus tachycardia on the EKG.  The patient denies orthopnea PND or chest pain.  Denies syncope or near syncopal episode.  Had history of back pain but able to take care of herself very well.  Syncope or near syncopal episode reported.  Patient scheduled to have a Holter is an echocardiographic study.  Recent TSH level within normal range and lipid profile also within the recommended range.  No fever cough which was reported.  No dysuria or hematuria or bright red blood per rectum reported.  No intermittent claudication reported.    1/2018  Total Cholesterol 150 - 200 mg/dL 183    Triglycerides 35 - 160 mg/dL 92    HDL Cholesterol 35 - 100 mg/dL 75    LDL Cholesterol  mg/dL 90    VLDL Cholesterol mg/dL 18.4    LDL/HDL Ratio  1.19      TSH 0.460 - 4.680 mIU/mL 1.130          SUBJECTIVE    Allergies   Allergen Reactions   • Penicillins Swelling and Rash         Past Medical History:   Diagnosis Date   • Acute bronchitis    • Acute sinusitis    • Acute upper respiratory infection    • Anxiety    • Cellulitis of lower leg    • Chronic urinary tract infection    • Cough    • Death of     • Diabetic asymmetric polyneuropathy    • Diabetic peripheral neuropathy    • Diabetic polyneuropathy    • Disease of nail    • Dorsalgia    • Flank pain    • Hashimoto's thyroiditis    • History of echocardiogram 02/19/2013   • Hypertensive disorder    •  Insomnia    • Mixed hyperlipidemia    • Non-healing surgical wound    • Osteoarthritis of multiple joints    • Peripheral vascular disease    • Seasonal allergic rhinitis    • Type II diabetes mellitus, uncontrolled    • Upper respiratory infection    • Urinary tract infectious disease    • Urinary tract infectious disease    • Vitamin D deficiency          Past Surgical History:   Procedure Laterality Date   • EXCISION MASS LEG Left 11/05/2013    Excision of soft tissue mass of left leg   • HYSTERECTOMY     • STEROID INJECTION  02/08/2016    Depo Medrol (Methylprednisone) 80mg (Acute upper respiratory infection, unspecified)          History reviewed. No pertinent family history.      Social History     Social History   • Marital status:      Spouse name: N/A   • Number of children: N/A   • Years of education: N/A     Occupational History   • Not on file.     Social History Main Topics   • Smoking status: Never Smoker   • Smokeless tobacco: Never Used   • Alcohol use No   • Drug use: No   • Sexual activity: Defer     Other Topics Concern   • Not on file     Social History Narrative   • No narrative on file         Current Outpatient Prescriptions   Medication Sig Dispense Refill   • aspirin 81 MG tablet Take 81 mg by mouth daily.     • B-D ULTRAFINE III SHORT PEN 31G X 8 MM misc Use and discard 1 pen needle 4 times daily. 200 each 5   • Calcium Carbonate (CALCIUM 600 PO) Take 2 tablets by mouth daily.     • diclofenac (VOLTAREN) 1 % gel gel Apply 4 g topically 4 (Four) Times a Day. 100 g 11   • EASY TOUCH LANCETS 28G/TWIST misc TEST FOUR TIMES A  each 11   • ergocalciferol (ERGOCALCIFEROL) 14387 UNITS capsule Take 1 capsule by mouth 1 (One) Time Per Week. 12 capsule 3   • fluticasone (FLONASE) 50 MCG/ACT nasal spray SPRAY 1 SPRAY INTO EACH NOSTRIL BIDM 16 g 5   • gabapentin (NEURONTIN) 300 MG capsule 1 capsule TID 90 capsule 0   • HYDROcodone-acetaminophen (NORCO) 7.5-325 MG per tablet Take 1 tablet  "by mouth 2 (Two) Times a Day. 60 tablet 0   • insulin aspart (novoLOG FLEXPEN) 100 UNIT/ML solution pen-injector sc pen Inject 2-8 Units under the skin 3 (Three) Times a Day With Meals. INJECT 2-8 UNITS THREE TIMES A DAY BEFORE MEALS 15 pen 3   • LANTUS SOLOSTAR 100 UNIT/ML injection pen INJECT SUBCUTANEOUSLY 15   UNITS EVERY NIGHT 15 mL 3   • levoFLOXacin (LEVAQUIN) 250 MG tablet Take 1 tablet by mouth Daily. 5 tablet 0   • levoFLOXacin (LEVAQUIN) 250 MG tablet Take 1 tablet by mouth Daily. 5 tablet 0   • levothyroxine (SYNTHROID, LEVOTHROID) 100 MCG tablet TAKE 1 TABLET DAILY 90 tablet 1   • lisinopril (PRINIVIL,ZESTRIL) 20 MG tablet Take 1 tablet by mouth Daily. 90 tablet 3   • LORazepam (ATIVAN) 1 MG tablet Take 1 tablet by mouth At Night As Needed for Anxiety or Sleep. 30 tablet 2   • metFORMIN (GLUCOPHAGE) 850 MG tablet TAKE 1 TABLET TWICE A DAY  WITH MEALS 180 tablet 1   • montelukast (SINGULAIR) 10 MG tablet TAKE 1 TABLET BY MOUTH EVERY NIGHT  DAYS. 30 tablet 5   • Needle, Disp, 31G X 5/16\" misc 1 each 4 (Four) Times a Day. Pen Needle; 400 each 3   • nitrofurantoin (MACRODANTIN) 100 MG capsule 1 capsule twice a day 20 capsule 0   • simvastatin (ZOCOR) 20 MG tablet 1 TABLET(S) BY MOUTH AT BEDTIME 90 tablet 3   • traZODone (DESYREL) 50 MG tablet TAKE 1 TO 2 TABLETS NIGHTLYAT BEDTIME 180 tablet 0   • TRUE METRIX BLOOD GLUCOSE TEST test strip USE TO TEST FOUR TIMES DAILY 150 each 11     No current facility-administered medications for this visit.          OBJECTIVE    /70   Pulse 75   Ht 167.6 cm (65.98\")   Wt 83.9 kg (185 lb)   BMI 29.87 kg/m²         Review of Systems     Constitutional:  Denies recent weight loss, weight gain, fever or chills, no change in exercise tolerance     HENT:  Denies any hearing loss, epistaxis, hoarseness, or difficulty speaking.     Eyes: No bluring    Respiratory:  Denies dyspnea with exertion,no cough, wheezing, or hemoptysis.     Cardiovascular: See " H&P    Gastrointestinal:  Denies change in bowel habits, dyspepsia, ulcer disease, hematochezia, or melena.     Endocrine: Negative for cold intolerance, heat intolerance, polydipsia, polyphagia and polyuria. Denies any history of weight change, heat/cold intolerance, polydipsia, polyuria     Genitourinary: Negative.      Musculoskeletal: Straight back pain and diabetic neuropathy     Skin:  Denies any change in hair or nails, rashes, or skin lesions.     Allergic/Immunologic: Negative.  Negative for environmental allergies, food allergies and immunocompromised state.     Neurological:  Denies any history of recurrent headaches, strokes, TIA, or seizure disorder.     Hematological: Denies any food allergies, seasonal allergies, bleeding disorders, or lymphadenopathy.     Psychiatric/Behavioral: Denies any history of depression, substance abuse, or change in cognitive function.         Physical Exam     Constitutional: Cooperative, alert and oriented, well-developed, well-nourished, in no acute distress.     HENT:   Head: Normocephalic, normal hair patterns, no masses or tenderness.  Ears, Nose, and Throat: No gross abnormalities. No pallor or cyanosis. Dentition good.   Eyes: EOMS intact, PERRL, conjunctivae and lids unremarkable. Fundoscopic exam and visual fields not performed.   Neck: No palpable masses or adenopathy, no thyromegaly, no JVD, carotid pulses are full and equal bilaterally and without  Bruits.     Cardiovascular: Regular rhythm, S1 and S2 normal, no S3 or S4. Apical impulse not displaced. , gallops, or rubs detected. systolic murmur    Pulmonary/Chest: Chest: normal symmetry, no tenderness to palpation, normal respiratory excursion, no intercostal retraction, no use of accessory muscles.            Pulmonary: Normal breath sounds. No rales or ronchi.    Abdominal: Abdomen soft, bowel sounds normoactive, no masses, no hepatosplenomegaly, non-tender, no bruits.     Musculoskeletal: No deformities,  clubbing, cyanosis, erythema, or edema observed. There are no spinal abnormalities noted. Normal muscle strength and tone. Pulses full and equal in all extremities, no bruits auscultated.     Neurological: No gross motor or sensory deficits noted, affect appropriate, oriented to time, person, place.     Skin: Warm and dry to the touch, no apparent skin lesions or masses noted.     Psychiatric: She has a normal mood and affect. Her behavior is normal. Judgment and thought content normal.         Procedures      Lab Results   Component Value Date    WBC 9.39 01/24/2018    HGB 12.8 01/24/2018    HCT 38.6 01/24/2018    MCV 90.4 01/24/2018     01/24/2018     Lab Results   Component Value Date    GLUCOSE 147 (H) 01/24/2018    BUN 17 01/24/2018    CREATININE 0.60 01/24/2018    EGFRIFNONA 96 (H) 01/24/2018    BCR 28.3 (H) 01/24/2018    CO2 27.0 01/24/2018    CALCIUM 10.0 01/24/2018    ALBUMIN 3.90 01/24/2018    LABIL2 1.3 01/24/2018    AST 19 01/24/2018    ALT 13 01/24/2018     Lab Results   Component Value Date    CHOL 183 01/24/2018     Lab Results   Component Value Date    TRIG 92 01/24/2018    TRIG 143 07/20/2016    TRIG 147 07/20/2016     Lab Results   Component Value Date    HDL 75 01/24/2018    HDL 57.4 07/20/2016    HDL 56.5 01/04/2016     No components found for: LDLCALC  Lab Results   Component Value Date    LDL 90 01/24/2018    LDL 90 07/20/2016     07/20/2016     No results found for: HDLLDLRATIO  No components found for: CHOLHDL  Lab Results   Component Value Date    HGBA1C 7.1 (H) 01/24/2018     Lab Results   Component Value Date    TSH 1.130 01/24/2018           ASSESSMENT AND PLAN  #1 sinus tachycardia #2 long-standing history diabetes #3 hypertension #4 hyperlipidemia #5 mild dyspnea exertion with associated fatigability #6 systolic murmur    82 years old physically active with a background history of hypertension, hyperlipidemia, long-standing history of diabetes, history of secondhand  smoking, increasing fatigability and mild shortness of breath who admitted her sinus tachycardia and referred for further medications.  Given the history of diabetes and change in clinical condition seemed appropriate to further evaluate with stress test.  I will arrange Lexiscan Cardiolite given the peripheral neuropathic and chronic back pain.  I will add beta blocker such as Toprol starting the dose of 25 mg her resting heart rate in the office today 90 bpm.  Arrange an echocardiographic study and holter.  Risk factor and lifestyle modification discussed given the BMI of 29 with history of hypertension diabetes.  Recommend to continue insulin and metformin for management of diabetes simvastatin for hyperlipidemia lisinopril for hypertension and continue aspirin.  Further recommendation based based on outcome of cardiac evaluations.    Nyla was seen today for rapid heart rate.    Diagnoses and all orders for this visit:    Type 2 diabetes mellitus without complication, with long-term current use of insulin    Essential hypertension    Tachycardia    Dyspnea, unspecified type    Mixed hyperlipidemia        Eagle Haywood MD  4/18/2018  8:53 AM

## 2018-05-08 RX ORDER — NEBIVOLOL 10 MG/1
10 TABLET ORAL DAILY
Qty: 30 TABLET | Refills: 5 | Status: SHIPPED | OUTPATIENT
Start: 2018-05-08 | End: 2018-05-11 | Stop reason: ALTCHOICE

## 2018-05-09 ENCOUNTER — APPOINTMENT (OUTPATIENT)
Dept: NUCLEAR MEDICINE | Facility: HOSPITAL | Age: 83
End: 2018-05-09
Attending: INTERNAL MEDICINE

## 2018-05-09 ENCOUNTER — APPOINTMENT (OUTPATIENT)
Dept: CARDIOLOGY | Facility: HOSPITAL | Age: 83
End: 2018-05-09
Attending: INTERNAL MEDICINE

## 2018-05-11 RX ORDER — AMLODIPINE BESYLATE 5 MG/1
5 TABLET ORAL DAILY
Qty: 30 TABLET | Refills: 5 | Status: SHIPPED | OUTPATIENT
Start: 2018-05-11 | End: 2018-08-03 | Stop reason: SDUPTHER

## 2018-05-14 ENCOUNTER — OFFICE VISIT (OUTPATIENT)
Dept: FAMILY MEDICINE CLINIC | Facility: CLINIC | Age: 83
End: 2018-05-14

## 2018-05-14 VITALS
HEIGHT: 65 IN | SYSTOLIC BLOOD PRESSURE: 98 MMHG | WEIGHT: 185 LBS | DIASTOLIC BLOOD PRESSURE: 56 MMHG | BODY MASS INDEX: 30.82 KG/M2

## 2018-05-14 DIAGNOSIS — B02.9 HERPES ZOSTER WITHOUT COMPLICATION: Primary | ICD-10-CM

## 2018-05-14 DIAGNOSIS — J18.9 COMMUNITY ACQUIRED PNEUMONIA, UNSPECIFIED LATERALITY: ICD-10-CM

## 2018-05-14 PROCEDURE — 99214 OFFICE O/P EST MOD 30 MIN: CPT | Performed by: FAMILY MEDICINE

## 2018-05-14 RX ORDER — FAMCICLOVIR 250 MG/1
250 TABLET ORAL 3 TIMES DAILY
Qty: 15 TABLET | Refills: 0 | Status: ON HOLD | OUTPATIENT
Start: 2018-05-14 | End: 2020-03-07

## 2018-05-14 RX ORDER — HYDRALAZINE HYDROCHLORIDE 50 MG/1
TABLET, FILM COATED ORAL
Refills: 0 | COMMUNITY
Start: 2018-05-07 | End: 2018-08-03

## 2018-05-14 RX ORDER — VALSARTAN 160 MG/1
TABLET ORAL
Refills: 0 | COMMUNITY
Start: 2018-05-07 | End: 2018-06-07 | Stop reason: SDUPTHER

## 2018-05-14 NOTE — PROGRESS NOTES
Subjective   Nyla Piña is a 82 y.o. female who presents to the office for  Follow up on pneumonia.  She spent a week in the hospital.  She said she was very dehydrated and overall received about 15 bags of IV fluids while there.  Her blood pressure has been low and is still running low but she's been taking her blood pressure medication because when it was held it went to high.  She's really not having any hypotensive symptoms such as orthostasis.  She's completed all of her antibiotics from the hospital but is still feeling very weak.  Having as much cough however.  Also, 2 days ago she started burning and itching along the left flank and broke out in a vesicular rash.     History of Present Illness   The following portions of the patient's history were reviewed and updated as appropriate: allergies, current medications, past family history, past medical history, past social history, past surgical history and problem list.    Review of Systems   HENT: Negative.    Respiratory: Positive for cough and shortness of breath.    Cardiovascular: Negative.    Gastrointestinal: Negative.    Genitourinary: Negative for flank pain, frequency, hematuria and urgency.   Musculoskeletal: Positive for gait problem.   Skin: Negative.    Allergic/Immunologic: Negative for immunocompromised state.   Neurological: Negative for dizziness, tremors, seizures and syncope.   Hematological: Negative.    Psychiatric/Behavioral: Positive for sleep disturbance. Negative for agitation, confusion and dysphoric mood. The patient is not nervous/anxious.    All other systems reviewed and are negative.    Objective   Physical Exam   Constitutional: She is oriented to person, place, and time. She appears well-developed and well-nourished.   HENT:   Head: Normocephalic and atraumatic.   Nose: Nose normal.   Mouth/Throat: Oropharynx is clear and moist.   Eyes: Conjunctivae and EOM are normal. Pupils are equal, round, and reactive to light.    Neck: Normal range of motion. Neck supple. No JVD present. No tracheal deviation present. No thyromegaly present.   Cardiovascular: Regular rhythm and intact distal pulses.    Murmur heard.  2/6 holosystolic murmur is noted today   Pulmonary/Chest: Effort normal and breath sounds normal. She has no wheezes.   Abdominal: Soft. Bowel sounds are normal. She exhibits no distension. There is no tenderness.   Musculoskeletal: She exhibits tenderness. She exhibits no edema.   Lymphadenopathy:     She has no cervical adenopathy.   Neurological: She is alert and oriented to person, place, and time. Coordination normal.   Skin: Skin is warm and dry. No rash noted.   She has a vesicular rash in a dermatomal pattern along the right flank on the right side    Psychiatric: She has a normal mood and affect.   Nursing note and vitals reviewed.      Assessment/Plan   Nyla was seen today for follow-up and hypotension.    Diagnoses and all orders for this visit:    Herpes zoster without complication    Community acquired pneumonia, unspecified laterality    Other orders  -     famciclovir (FAMVIR) 250 MG tablet; Take 1 tablet by mouth 3 (Three) Times a Day.    Will start famciclovir for the shingles and see her back in 2 weeks    She's completed medicine for pneumonia.  Still quite weak but I suspect this has to do with the viral infection as well.  Certainly may also benefit pneumonia.  I will obtain her hospital discharge summary and records to review and check her back in 2 weeks, consider repeat chest x-ray.         This document has been electronically signed by Chantel Long MD on May 14, 2018 10:48 AM

## 2018-05-18 ENCOUNTER — TELEPHONE (OUTPATIENT)
Dept: CARDIOLOGY | Facility: CLINIC | Age: 83
End: 2018-05-18

## 2018-05-18 NOTE — TELEPHONE ENCOUNTER
Pt called regarding Zio results per MD instruction.  Attempted to make an appointment but pt states she has pneumonia and shingles and doesn't want to make one at this time.  States that she has a stress test and if she feels better to do that, she will make an appointment for that day if possible.  Otherwise, pt will call and schedule an appointment,

## 2018-05-23 RX ORDER — LORAZEPAM 1 MG/1
1 TABLET ORAL NIGHTLY PRN
Qty: 30 TABLET | Refills: 2 | Status: SHIPPED | OUTPATIENT
Start: 2018-05-23 | End: 2018-06-20 | Stop reason: SDUPTHER

## 2018-05-23 RX ORDER — LORAZEPAM 1 MG/1
1 TABLET ORAL NIGHTLY PRN
Qty: 30 TABLET | Refills: 2 | Status: SHIPPED | OUTPATIENT
Start: 2018-05-23 | End: 2018-05-23 | Stop reason: SDUPTHER

## 2018-05-23 RX ORDER — GABAPENTIN 300 MG/1
CAPSULE ORAL
Qty: 90 CAPSULE | Refills: 0 | Status: SHIPPED | OUTPATIENT
Start: 2018-05-23 | End: 2018-05-23 | Stop reason: SDUPTHER

## 2018-05-23 RX ORDER — GABAPENTIN 300 MG/1
CAPSULE ORAL
Qty: 90 CAPSULE | Refills: 0 | Status: SHIPPED | OUTPATIENT
Start: 2018-05-23 | End: 2018-06-20 | Stop reason: SDUPTHER

## 2018-05-23 RX ORDER — HYDROCODONE BITARTRATE AND ACETAMINOPHEN 7.5; 325 MG/1; MG/1
1 TABLET ORAL
Qty: 60 TABLET | Refills: 0 | Status: SHIPPED | OUTPATIENT
Start: 2018-05-23 | End: 2018-06-20 | Stop reason: SDUPTHER

## 2018-05-25 ENCOUNTER — DOCUMENTATION (OUTPATIENT)
Dept: CARDIOLOGY | Facility: CLINIC | Age: 83
End: 2018-05-25

## 2018-05-29 ENCOUNTER — APPOINTMENT (OUTPATIENT)
Dept: NUCLEAR MEDICINE | Facility: HOSPITAL | Age: 83
End: 2018-05-29
Attending: INTERNAL MEDICINE

## 2018-05-29 ENCOUNTER — APPOINTMENT (OUTPATIENT)
Dept: CARDIOLOGY | Facility: HOSPITAL | Age: 83
End: 2018-05-29
Attending: INTERNAL MEDICINE

## 2018-05-30 ENCOUNTER — OFFICE VISIT (OUTPATIENT)
Dept: FAMILY MEDICINE CLINIC | Facility: CLINIC | Age: 83
End: 2018-05-30

## 2018-05-30 ENCOUNTER — LAB (OUTPATIENT)
Dept: LAB | Facility: OTHER | Age: 83
End: 2018-05-30

## 2018-05-30 VITALS
DIASTOLIC BLOOD PRESSURE: 76 MMHG | HEIGHT: 65 IN | BODY MASS INDEX: 30.82 KG/M2 | WEIGHT: 185 LBS | SYSTOLIC BLOOD PRESSURE: 120 MMHG

## 2018-05-30 DIAGNOSIS — B02.9 HERPES ZOSTER WITHOUT COMPLICATION: ICD-10-CM

## 2018-05-30 DIAGNOSIS — J18.9 PNEUMONIA DUE TO INFECTIOUS ORGANISM, UNSPECIFIED LATERALITY, UNSPECIFIED PART OF LUNG: ICD-10-CM

## 2018-05-30 DIAGNOSIS — J18.9 COMMUNITY ACQUIRED PNEUMONIA, UNSPECIFIED LATERALITY: Primary | ICD-10-CM

## 2018-05-30 DIAGNOSIS — J18.9 COMMUNITY ACQUIRED PNEUMONIA, UNSPECIFIED LATERALITY: ICD-10-CM

## 2018-05-30 LAB
ALBUMIN SERPL-MCNC: 4.2 G/DL (ref 3.2–5.5)
ALBUMIN/GLOB SERPL: 1.2 G/DL (ref 1–3)
ALP SERPL-CCNC: 56 U/L (ref 15–121)
ALT SERPL W P-5'-P-CCNC: 24 U/L (ref 10–60)
ANION GAP SERPL CALCULATED.3IONS-SCNC: 13 MMOL/L (ref 5–15)
AST SERPL-CCNC: 27 U/L (ref 10–60)
BASOPHILS # BLD AUTO: 0.04 10*3/MM3 (ref 0–0.2)
BASOPHILS NFR BLD AUTO: 0.4 % (ref 0–2)
BILIRUB SERPL-MCNC: 0.7 MG/DL (ref 0.2–1)
BUN BLD-MCNC: 22 MG/DL (ref 8–25)
BUN/CREAT SERPL: 15.7 (ref 7–25)
CALCIUM SPEC-SCNC: 10.8 MG/DL (ref 8.4–10.8)
CHLORIDE SERPL-SCNC: 96 MMOL/L (ref 100–112)
CO2 SERPL-SCNC: 25 MMOL/L (ref 20–32)
CREAT BLD-MCNC: 1.4 MG/DL (ref 0.4–1.3)
DEPRECATED RDW RBC AUTO: 45.3 FL (ref 36.4–46.3)
EOSINOPHIL # BLD AUTO: 0.39 10*3/MM3 (ref 0–0.7)
EOSINOPHIL NFR BLD AUTO: 3.8 % (ref 0–7)
ERYTHROCYTE [DISTWIDTH] IN BLOOD BY AUTOMATED COUNT: 13.8 % (ref 11.5–14.5)
GFR SERPL CREATININE-BSD FRML MDRD: 36 ML/MIN/1.73 (ref 39–90)
GLOBULIN UR ELPH-MCNC: 3.5 GM/DL (ref 2.5–4.6)
GLUCOSE BLD-MCNC: 161 MG/DL (ref 70–100)
HCT VFR BLD AUTO: 40.2 % (ref 35–45)
HGB BLD-MCNC: 13 G/DL (ref 12–15.5)
LYMPHOCYTES # BLD AUTO: 3.52 10*3/MM3 (ref 0.6–4.2)
LYMPHOCYTES NFR BLD AUTO: 34.3 % (ref 10–50)
MCH RBC QN AUTO: 30.1 PG (ref 26.5–34)
MCHC RBC AUTO-ENTMCNC: 32.3 G/DL (ref 31.4–36)
MCV RBC AUTO: 93.1 FL (ref 80–98)
MONOCYTES # BLD AUTO: 1.07 10*3/MM3 (ref 0–0.9)
MONOCYTES NFR BLD AUTO: 10.4 % (ref 0–12)
NEUTROPHILS # BLD AUTO: 5.24 10*3/MM3 (ref 2–8.6)
NEUTROPHILS NFR BLD AUTO: 51.1 % (ref 37–80)
PLATELET # BLD AUTO: 372 10*3/MM3 (ref 150–450)
PMV BLD AUTO: 8.7 FL (ref 8–12)
POTASSIUM BLD-SCNC: 4.9 MMOL/L (ref 3.4–5.4)
PROT SERPL-MCNC: 7.7 G/DL (ref 6.7–8.2)
RBC # BLD AUTO: 4.32 10*6/MM3 (ref 3.77–5.16)
SODIUM BLD-SCNC: 134 MMOL/L (ref 134–146)
WBC NRBC COR # BLD: 10.26 10*3/MM3 (ref 3.2–9.8)

## 2018-05-30 PROCEDURE — 36415 COLL VENOUS BLD VENIPUNCTURE: CPT | Performed by: FAMILY MEDICINE

## 2018-05-30 PROCEDURE — 99214 OFFICE O/P EST MOD 30 MIN: CPT | Performed by: FAMILY MEDICINE

## 2018-05-30 PROCEDURE — 80053 COMPREHEN METABOLIC PANEL: CPT | Performed by: FAMILY MEDICINE

## 2018-05-30 PROCEDURE — 85025 COMPLETE CBC W/AUTO DIFF WBC: CPT | Performed by: FAMILY MEDICINE

## 2018-05-30 NOTE — PROGRESS NOTES
Subjective   Nyla Piña is a 82 y.o. female who presents to the office for  Follow up on pneumonia.  She is still just not bouncing back with her energy.  She is here today for her follow-up chest x-ray.  She finished her antibiotics.  She still just very tired and weak overall.  Also the shingles is still bothering her but is somewhat better.  She's finished the medicine and is noticed on the topical cream for this  History of Present Illness   The following portions of the patient's history were reviewed and updated as appropriate: allergies, current medications, past family history, past medical history, past social history, past surgical history and problem list.    Review of Systems   HENT: Negative.    Respiratory: Positive for cough and shortness of breath.    Cardiovascular: Negative.    Gastrointestinal: Negative.    Genitourinary: Negative for flank pain, frequency, hematuria and urgency.   Musculoskeletal: Positive for gait problem.   Skin: Negative.    Allergic/Immunologic: Negative for immunocompromised state.   Neurological: Negative for dizziness, tremors, seizures and syncope.   Hematological: Negative.    Psychiatric/Behavioral: Positive for sleep disturbance. Negative for agitation, confusion and dysphoric mood. The patient is not nervous/anxious.    All other systems reviewed and are negative.    Objective   Physical Exam   Constitutional: She is oriented to person, place, and time. She appears well-developed and well-nourished.   HENT:   Head: Normocephalic and atraumatic.   Nose: Nose normal.   Mouth/Throat: Oropharynx is clear and moist.   Eyes: Conjunctivae and EOM are normal. Pupils are equal, round, and reactive to light.   Neck: Normal range of motion. Neck supple. No JVD present. No tracheal deviation present. No thyromegaly present.   Cardiovascular: Regular rhythm and intact distal pulses.    Murmur heard.  2/6 holosystolic murmur is noted today   Pulmonary/Chest: Effort normal and  breath sounds normal. She has no wheezes.   Abdominal: Soft. Bowel sounds are normal. She exhibits no distension. There is no tenderness.   Musculoskeletal: She exhibits tenderness. She exhibits no edema.   Lymphadenopathy:     She has no cervical adenopathy.   Neurological: She is alert and oriented to person, place, and time. Coordination normal.   Skin: Skin is warm and dry. No rash noted.   She has a rash in a dermatomal pattern along the right flank on the right side, it is flat and no vesicles today   Psychiatric: She has a normal mood and affect.   Nursing note and vitals reviewed.    Radiology Images   Study Result        TWO VIEW CHEST     HISTORY: Recent pneumonia.     Frontal and lateral films of the chest were obtained.  COMPARISON: July 29, 2014     Chronic obstructive pulmonary disease.  Linear atelectasis or scar lingular segment left upper lobe.  Minimal cardiomegaly.  The pulmonary vasculature is not increased.  No pleural effusion.  No pneumothorax.  No acute osseous abnormality.  Degenerative changes are present in the thoracic spine.  Atherosclerotic calcification aortic arch and descending aorta.  Clips upper abdomen.     IMPRESSION:  CONCLUSION:  Chronic obstructive pulmonary disease.  Linear atelectasis or scar lingular segment left upper lobe.  Minimal cardiomegaly.     [id number]     Electronically signed by:  Toni Queen MD  5/30/2018 2:58 PM CDT  Workstation: [email or web address]         Assessment/Plan   Nyla was seen today for follow-up.    Diagnoses and all orders for this visit:    Community acquired pneumonia, unspecified laterality  -     Cancel: XR Chest PA & Lateral; Future  -     XR Chest PA & Lateral; Future    Herpes zoster without complication     Chest x-ray does show improvement, with no pneumonia or residual.  She does have COPD but she's never been a smoker but lived with a smoker.  Breo inhaler to use with instructions for use, we'll check labs today (cbc, CMP) as she is still  quite fatigued.  Have her come back in 2 weeks or sooner for problems    She may continue the topical shingles cream, it looks like it is healing well        This document has been electronically signed by Chantel Long MD on May 30, 2018 2:23 PM

## 2018-05-31 DIAGNOSIS — E86.0 DEHYDRATION: Primary | ICD-10-CM

## 2018-06-01 RX ORDER — DULOXETIN HYDROCHLORIDE 60 MG/1
CAPSULE, DELAYED RELEASE ORAL
Qty: 90 CAPSULE | Refills: 1 | Status: SHIPPED | OUTPATIENT
Start: 2018-06-01 | End: 2018-11-20 | Stop reason: SDUPTHER

## 2018-06-06 RX ORDER — INSULIN GLARGINE 100 [IU]/ML
15 INJECTION, SOLUTION SUBCUTANEOUS NIGHTLY
Qty: 15 ML | Refills: 5 | Status: SHIPPED | OUTPATIENT
Start: 2018-06-06 | End: 2018-06-08 | Stop reason: CLARIF

## 2018-06-07 RX ORDER — VALSARTAN 160 MG/1
160 TABLET ORAL DAILY
Qty: 30 TABLET | Refills: 5 | Status: SHIPPED | OUTPATIENT
Start: 2018-06-07 | End: 2018-07-25 | Stop reason: ALTCHOICE

## 2018-06-20 ENCOUNTER — OFFICE VISIT (OUTPATIENT)
Dept: FAMILY MEDICINE CLINIC | Facility: CLINIC | Age: 83
End: 2018-06-20

## 2018-06-20 VITALS
WEIGHT: 185 LBS | HEIGHT: 65 IN | SYSTOLIC BLOOD PRESSURE: 110 MMHG | BODY MASS INDEX: 30.82 KG/M2 | DIASTOLIC BLOOD PRESSURE: 70 MMHG

## 2018-06-20 DIAGNOSIS — G47.00 INSOMNIA, UNSPECIFIED TYPE: ICD-10-CM

## 2018-06-20 DIAGNOSIS — J44.9 CHRONIC OBSTRUCTIVE PULMONARY DISEASE, UNSPECIFIED COPD TYPE (HCC): ICD-10-CM

## 2018-06-20 DIAGNOSIS — B02.23 NEUROPATHY DUE TO HERPES ZOSTER: ICD-10-CM

## 2018-06-20 DIAGNOSIS — M15.9 OSTEOARTHRITIS OF MULTIPLE JOINTS, UNSPECIFIED OSTEOARTHRITIS TYPE: ICD-10-CM

## 2018-06-20 DIAGNOSIS — E11.42 DIABETIC PERIPHERAL NEUROPATHY (HCC): Primary | ICD-10-CM

## 2018-06-20 PROCEDURE — 99214 OFFICE O/P EST MOD 30 MIN: CPT | Performed by: FAMILY MEDICINE

## 2018-06-20 RX ORDER — HYDROCODONE BITARTRATE AND ACETAMINOPHEN 7.5; 325 MG/1; MG/1
1 TABLET ORAL
Qty: 60 TABLET | Refills: 0 | Status: SHIPPED | OUTPATIENT
Start: 2018-06-20 | End: 2018-07-27 | Stop reason: SDUPTHER

## 2018-06-20 RX ORDER — ALBUTEROL SULFATE 90 UG/1
2 AEROSOL, METERED RESPIRATORY (INHALATION) EVERY 6 HOURS PRN
Qty: 1 INHALER | Refills: 3 | Status: ON HOLD | OUTPATIENT
Start: 2018-06-20 | End: 2020-03-07

## 2018-06-20 RX ORDER — GABAPENTIN 300 MG/1
CAPSULE ORAL
Qty: 90 CAPSULE | Refills: 0 | Status: SHIPPED | OUTPATIENT
Start: 2018-06-20 | End: 2018-07-27 | Stop reason: SDUPTHER

## 2018-06-20 RX ORDER — LORAZEPAM 1 MG/1
1 TABLET ORAL NIGHTLY PRN
Qty: 30 TABLET | Refills: 2 | Status: SHIPPED | OUTPATIENT
Start: 2018-06-20 | End: 2018-07-25 | Stop reason: SDUPTHER

## 2018-06-20 NOTE — PROGRESS NOTES
Subjective   Nyla Piña is a 83 y.o. female who presents to the office for follow-up on shingles.  She is not having any new lesions but has some scarring around the back on the right side and dermatomal pattern and still has burning that deep within.  The gabapentin does seem to help somewhat.  She needs refills of her medicines including pain medicine for arthritis.   She was recently diagnosed with COPD after an episode of pneumonia.  She still has some cough and wheezing at times and doesn't have a rescue inhaler.  The Breo didn't seem to help    History of Present Illness   Arthritis   Presents for follow-up visit. Complains of pain, stiffness and joint swelling, reports no joint warmth. The symptoms have been worsening. Affected location: diffuse. Pain is at a severity of 8/10. Associated symptoms include fatigue, pain at night and pain while resting. Pertinent negatives include no diarrhea, dry eyes, dry mouth, dysuria, fever, rash, Raynaud's syndrome, uveitis or weight loss. Compliance with total regimen is %. Compliance with medications is %.     The following portions of the patient's history were reviewed and updated as appropriate: allergies, current medications, past family history, past medical history, past social history, past surgical history and problem list.    Review of Systems   HENT: Negative.    Respiratory: Positive for cough and shortness of breath.    Cardiovascular: Negative.    Gastrointestinal: Negative.    Genitourinary: Negative for flank pain, frequency, hematuria and urgency.   Musculoskeletal: Positive for gait problem.   Skin: Negative.    Allergic/Immunologic: Negative for immunocompromised state.   Neurological: Negative for dizziness, tremors, seizures and syncope.   Hematological: Negative.    Psychiatric/Behavioral: Positive for sleep disturbance. Negative for agitation, confusion and dysphoric mood. The patient is not nervous/anxious.    All other systems  reviewed and are negative.    Objective   Physical Exam   Constitutional: She is oriented to person, place, and time. She appears well-developed and well-nourished.   HENT:   Head: Normocephalic and atraumatic.   Nose: Nose normal.   Mouth/Throat: Oropharynx is clear and moist.   Eyes: Conjunctivae and EOM are normal. Pupils are equal, round, and reactive to light.   Neck: Normal range of motion. Neck supple. No JVD present. No tracheal deviation present. No thyromegaly present.   Cardiovascular: Regular rhythm and intact distal pulses.    Murmur heard.  2/6 holosystolic murmur is noted today   Pulmonary/Chest: Effort normal and breath sounds normal. She has no wheezes.   Abdominal: Soft. Bowel sounds are normal. She exhibits no distension. There is no tenderness.   Musculoskeletal: She exhibits tenderness. She exhibits no edema.   Lymphadenopathy:     She has no cervical adenopathy.   Neurological: She is alert and oriented to person, place, and time. Coordination normal.   Skin: Skin is warm and dry. No rash noted.   She has a rash in a dermatomal pattern along the right flank on the right side, it is flat and no vesicles today   Psychiatric: She has a normal mood and affect.   Nursing note and vitals reviewed.    Assessment/Plan   Nlya was seen today for follow-up and med refill.    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  -     LORazepam (ATIVAN) 1 MG tablet; Take 1 tablet by mouth At Night As Needed for Anxiety or Sleep.  -     albuterol (VENTOLIN HFA) 108 (90 Base) MCG/ACT inhaler; Inhale 2 puffs  Every 6 (Six) Hours As Needed for Wheezing or Shortness of Air.       Continue with lorazepam to help her sleep at night, with issues related to anxiety and bereavement after the death of her .    Gave her a Proventil inhaler for the COPD to use only when needed and if she has to use frequent consider adding back a daily routine 1.    Continue with gabapentin for postherpetic neuropathy and hydrocodone for pain issues including arthritis.    The patient has read and signed the [facility] Controlled Substance Contract.  I will continue to see patient for regular follow up appointments. Patient is well controlled on the medication.  ANGELITO has been reviewed by me and is updated every 3 months. The patient is aware of the potential for addiction and dependence.           This document has been electronically signed by Chantel Long MD on June 20, 2018 11:50 AM

## 2018-07-18 LAB
25(OH)D3 SERPL-MCNC: 46.2 NG/ML (ref 30–100)
ALBUMIN SERPL-MCNC: 4.5 G/DL (ref 3.5–5)
ALBUMIN/GLOB SERPL: 1.5 G/DL (ref 1.1–1.8)
ALP SERPL-CCNC: 63 U/L (ref 38–126)
ALT SERPL W P-5'-P-CCNC: 17 U/L
ANION GAP SERPL CALCULATED.3IONS-SCNC: 9 MMOL/L (ref 5–15)
AST SERPL-CCNC: 21 U/L (ref 14–36)
BASOPHILS # BLD AUTO: 0.05 10*3/MM3 (ref 0–0.2)
BASOPHILS NFR BLD AUTO: 0.5 % (ref 0–2)
BILIRUB SERPL-MCNC: 1 MG/DL (ref 0.2–1.3)
BUN BLD-MCNC: 16 MG/DL (ref 7–17)
BUN/CREAT SERPL: 22.2 (ref 7–25)
CALCIUM SPEC-SCNC: 10.1 MG/DL (ref 8.4–10.2)
CHLORIDE SERPL-SCNC: 99 MMOL/L (ref 98–107)
CO2 SERPL-SCNC: 28 MMOL/L (ref 22–30)
CREAT BLD-MCNC: 0.72 MG/DL (ref 0.52–1.04)
DEPRECATED RDW RBC AUTO: 46.6 FL (ref 36.4–46.3)
EOSINOPHIL # BLD AUTO: 0.53 10*3/MM3 (ref 0–0.7)
EOSINOPHIL NFR BLD AUTO: 5.6 % (ref 0–7)
ERYTHROCYTE [DISTWIDTH] IN BLOOD BY AUTOMATED COUNT: 14.1 % (ref 11.5–14.5)
GFR SERPL CREATININE-BSD FRML MDRD: 78 ML/MIN/1.73 (ref 39–90)
GLOBULIN UR ELPH-MCNC: 3 GM/DL (ref 2.3–3.5)
GLUCOSE BLD-MCNC: 164 MG/DL (ref 74–99)
HBA1C MFR BLD: 6.9 % (ref 4–5.6)
HCT VFR BLD AUTO: 40.4 % (ref 35–45)
HGB BLD-MCNC: 13.1 G/DL (ref 12–15.5)
LYMPHOCYTES # BLD AUTO: 4.66 10*3/MM3 (ref 0.6–4.2)
LYMPHOCYTES NFR BLD AUTO: 49.1 % (ref 10–50)
MCH RBC QN AUTO: 30.1 PG (ref 26.5–34)
MCHC RBC AUTO-ENTMCNC: 32.4 G/DL (ref 31.4–36)
MCV RBC AUTO: 92.9 FL (ref 80–98)
MONOCYTES # BLD AUTO: 0.85 10*3/MM3 (ref 0–0.9)
MONOCYTES NFR BLD AUTO: 9 % (ref 0–12)
NEUTROPHILS # BLD AUTO: 3.4 10*3/MM3 (ref 2–8.6)
NEUTROPHILS NFR BLD AUTO: 35.8 % (ref 37–80)
PLATELET # BLD AUTO: 353 10*3/MM3 (ref 150–450)
PMV BLD AUTO: 8.7 FL (ref 8–12)
POTASSIUM BLD-SCNC: 4.4 MMOL/L (ref 3.4–5)
PROT SERPL-MCNC: 7.5 G/DL (ref 6.3–8.2)
RBC # BLD AUTO: 4.35 10*6/MM3 (ref 3.77–5.16)
SODIUM BLD-SCNC: 136 MMOL/L (ref 137–145)
TSH SERPL DL<=0.05 MIU/L-ACNC: 2.5 MIU/ML (ref 0.46–4.68)
WBC NRBC COR # BLD: 9.49 10*3/MM3 (ref 3.2–9.8)

## 2018-07-18 PROCEDURE — 36415 COLL VENOUS BLD VENIPUNCTURE: CPT | Performed by: NURSE PRACTITIONER

## 2018-07-18 PROCEDURE — 84443 ASSAY THYROID STIM HORMONE: CPT | Performed by: NURSE PRACTITIONER

## 2018-07-18 PROCEDURE — 80053 COMPREHEN METABOLIC PANEL: CPT | Performed by: NURSE PRACTITIONER

## 2018-07-18 PROCEDURE — 82306 VITAMIN D 25 HYDROXY: CPT | Performed by: NURSE PRACTITIONER

## 2018-07-18 PROCEDURE — 85025 COMPLETE CBC W/AUTO DIFF WBC: CPT | Performed by: NURSE PRACTITIONER

## 2018-07-18 PROCEDURE — 83036 HEMOGLOBIN GLYCOSYLATED A1C: CPT | Performed by: NURSE PRACTITIONER

## 2018-07-25 ENCOUNTER — OFFICE VISIT (OUTPATIENT)
Dept: ENDOCRINOLOGY | Facility: CLINIC | Age: 83
End: 2018-07-25

## 2018-07-25 VITALS
SYSTOLIC BLOOD PRESSURE: 118 MMHG | HEART RATE: 102 BPM | HEIGHT: 65 IN | DIASTOLIC BLOOD PRESSURE: 60 MMHG | BODY MASS INDEX: 30.49 KG/M2 | WEIGHT: 183 LBS

## 2018-07-25 DIAGNOSIS — E11.9 CONTROLLED TYPE 2 DIABETES MELLITUS WITHOUT COMPLICATION, WITH LONG-TERM CURRENT USE OF INSULIN (HCC): Primary | ICD-10-CM

## 2018-07-25 DIAGNOSIS — Z79.4 CONTROLLED TYPE 2 DIABETES MELLITUS WITHOUT COMPLICATION, WITH LONG-TERM CURRENT USE OF INSULIN (HCC): Primary | ICD-10-CM

## 2018-07-25 DIAGNOSIS — I10 ESSENTIAL HYPERTENSION: ICD-10-CM

## 2018-07-25 DIAGNOSIS — E55.9 VITAMIN D DEFICIENCY: ICD-10-CM

## 2018-07-25 DIAGNOSIS — E78.2 MIXED HYPERLIPIDEMIA: ICD-10-CM

## 2018-07-25 PROCEDURE — 99214 OFFICE O/P EST MOD 30 MIN: CPT | Performed by: NURSE PRACTITIONER

## 2018-07-25 RX ORDER — IRBESARTAN 150 MG/1
150 TABLET ORAL NIGHTLY
Qty: 30 TABLET | Refills: 5 | Status: SHIPPED | OUTPATIENT
Start: 2018-07-25 | End: 2019-01-25 | Stop reason: SDUPTHER

## 2018-07-25 RX ORDER — LORAZEPAM 1 MG/1
1 TABLET ORAL NIGHTLY PRN
Qty: 30 TABLET | Refills: 2 | Status: SHIPPED | OUTPATIENT
Start: 2018-07-25 | End: 2018-09-20 | Stop reason: SDUPTHER

## 2018-07-25 RX ORDER — ERGOCALCIFEROL 1.25 MG/1
50000 CAPSULE ORAL WEEKLY
Qty: 12 CAPSULE | Refills: 11 | Status: SHIPPED | OUTPATIENT
Start: 2018-07-25 | End: 2019-10-29 | Stop reason: SDUPTHER

## 2018-07-25 NOTE — PROGRESS NOTES
Subjective    Nyla Piña is a 83 y.o. female. she is here today for follow-up.    History of Present Illness     Duration/Timing:Diabetes mellitus type 2, Age at onset of diabetes : 58 years, Onset of symptoms gradual         Timing constant      quality near control      Severity Complications        Severity (Complications/Hospitalizations)  Secondary Macrovascular Complications: No CAD, No CVA, No PAD  Secondary Microvascular Complications: No Diabetic Nephropathy, Microalbuminuria, No proteinuria, No Diabetic Retinopathy, Diabetic Neuropathy     Context  Diabetes Regimen: Insulin, oral medication,       Lab Results   Component Value Date    HGBA1C 6.9 (H) 07/18/2018                   Blood Glucose Readings      Several at goal      Lowest 85      Diet      Low carb      Exercise:Does not exercise     Associated Signs/Symptoms  Hyperglycemic Symptoms:Polyruria, polydipsia, polyphagia, No blurred vision, No weight gain  Hypoglycemic Episodes:No documented hypoglycemia                 The following portions of the patient's history were reviewed and updated as appropriate:   Past Medical History:   Diagnosis Date   • Acute bronchitis    • Acute sinusitis    • Acute upper respiratory infection    • Anxiety    • Cellulitis of lower leg    • Chronic urinary tract infection    • Cough    • Death of     • Diabetic asymmetric polyneuropathy (CMS/HCC)    • Diabetic peripheral neuropathy (CMS/HCC)    • Diabetic polyneuropathy (CMS/HCC)    • Disease of nail    • Dorsalgia    • Flank pain    • Hashimoto's thyroiditis    • History of echocardiogram 02/19/2013   • Hypertensive disorder    • Insomnia    • Mixed hyperlipidemia    • Non-healing surgical wound    • Osteoarthritis of multiple joints    • Peripheral vascular disease (CMS/HCC)    • Seasonal allergic rhinitis    • Type II diabetes mellitus, uncontrolled (CMS/HCC)    • Upper respiratory infection    • Urinary tract infectious disease    • Urinary tract  infectious disease    • Vitamin D deficiency      Past Surgical History:   Procedure Laterality Date   • EXCISION MASS LEG Left 2013    Excision of soft tissue mass of left leg   • HYSTERECTOMY     • STEROID INJECTION  2016    Depo Medrol (Methylprednisone) 80mg (Acute upper respiratory infection, unspecified)      History reviewed. No pertinent family history.  OB History      Para Term  AB Living    2 2 2          SAB TAB Ectopic Molar Multiple Live Births                       Current Outpatient Prescriptions   Medication Sig Dispense Refill   • albuterol (VENTOLIN HFA) 108 (90 Base) MCG/ACT inhaler Inhale 2 puffs Every 6 (Six) Hours As Needed for Wheezing or Shortness of Air. 1 inhaler 3   • amLODIPine (NORVASC) 5 MG tablet Take 1 tablet by mouth Daily. 30 tablet 5   • aspirin 81 MG tablet Take 81 mg by mouth daily.     • B-D ULTRAFINE III SHORT PEN 31G X 8 MM misc Use and discard 1 pen needle 4 times daily. 200 each 5   • Calcium Carbonate (CALCIUM 600 PO) Take 2 tablets by mouth daily.     • diclofenac (VOLTAREN) 1 % gel gel Apply 4 g topically 4 (Four) Times a Day. 100 g 11   • DULoxetine (CYMBALTA) 60 MG capsule TAKE 1 CAPSULE DAILY 90 capsule 1   • EASY TOUCH LANCETS 28G/TWIST misc TEST FOUR TIMES A  each 11   • ergocalciferol (ERGOCALCIFEROL) 54591 units capsule Take 1 capsule by mouth 1 (One) Time Per Week. 12 capsule 11   • famciclovir (FAMVIR) 250 MG tablet Take 1 tablet by mouth 3 (Three) Times a Day. 15 tablet 0   • fluticasone (FLONASE) 50 MCG/ACT nasal spray SPRAY 1 SPRAY INTO EACH NOSTRIL BIDM 16 g 5   • gabapentin (NEURONTIN) 300 MG capsule 1 capsule TID 90 capsule 0   • hydrALAZINE (APRESOLINE) 50 MG tablet   0   • HYDROcodone-acetaminophen (NORCO) 7.5-325 MG per tablet Take 1 tablet by mouth 2 (Two) Times a Day. 60 tablet 0   • insulin aspart (novoLOG FLEXPEN) 100 UNIT/ML solution pen-injector sc pen Inject 2-8 Units under the skin 3 (Three) Times a Day With  "Meals. INJECT 2-8 UNITS THREE TIMES A DAY BEFORE MEALS 15 pen 3   • Insulin Glargine (LANTUS SOLOSTAR) 100 UNIT/ML injection pen Inject 15 Units under the skin Every Night. 15 pen 1   • irbesartan (AVAPRO) 150 MG tablet Take 1 tablet by mouth Every Night. 30 tablet 5   • levothyroxine (SYNTHROID, LEVOTHROID) 100 MCG tablet TAKE 1 TABLET DAILY 90 tablet 1   • LORazepam (ATIVAN) 1 MG tablet Take 1 tablet by mouth At Night As Needed for Anxiety or Sleep. 30 tablet 2   • metFORMIN (GLUCOPHAGE) 850 MG tablet TAKE 1 TABLET TWICE A DAY  WITH MEALS 180 tablet 1   • montelukast (SINGULAIR) 10 MG tablet TAKE 1 TABLET BY MOUTH EVERY NIGHT  DAYS. 30 tablet 5   • Needle, Disp, 31G X 5/16\" misc 1 each 4 (Four) Times a Day. Pen Needle; 400 each 3   • nitrofurantoin (MACRODANTIN) 100 MG capsule 1 capsule twice a day 20 capsule 0   • traZODone (DESYREL) 50 MG tablet TAKE 1 TO 2 TABLETS NIGHTLYAT BEDTIME 180 tablet 0   • TRUE METRIX BLOOD GLUCOSE TEST test strip USE TO TEST FOUR TIMES DAILY 150 each 11     No current facility-administered medications for this visit.      Allergies   Allergen Reactions   • Penicillins Swelling and Rash     Social History     Social History   • Marital status:      Social History Main Topics   • Smoking status: Never Smoker   • Smokeless tobacco: Never Used   • Alcohol use No   • Drug use: No   • Sexual activity: Defer     Other Topics Concern   • Not on file       Review of Systems  Review of Systems   Constitutional: Negative for activity change, appetite change, diaphoresis and fatigue.   HENT: Negative for facial swelling, sneezing, sore throat, tinnitus, trouble swallowing and voice change.    Eyes: Negative for photophobia, pain, discharge, redness, itching and visual disturbance.   Respiratory: Negative for apnea, cough, choking, chest tightness and shortness of breath.    Cardiovascular: Negative for chest pain, palpitations and leg swelling.   Gastrointestinal: Negative for " "abdominal distention, abdominal pain, constipation, diarrhea, nausea and vomiting.   Endocrine: Negative for cold intolerance, heat intolerance, polydipsia, polyphagia and polyuria.   Genitourinary: Negative for difficulty urinating, dysuria, frequency, hematuria and urgency.   Musculoskeletal: Negative for arthralgias, back pain, gait problem, joint swelling, myalgias, neck pain and neck stiffness.   Skin: Negative for color change, pallor, rash and wound.   Neurological: Negative for dizziness, tremors, weakness, light-headedness, numbness and headaches.   Hematological: Negative for adenopathy. Does not bruise/bleed easily.   Psychiatric/Behavioral: Negative for behavioral problems, confusion and sleep disturbance.        Objective    /60 (BP Location: Right arm, Patient Position: Sitting, Cuff Size: Adult)   Pulse 102   Ht 165.1 cm (65\")   Wt 83 kg (183 lb)   BMI 30.45 kg/m²   Physical Exam   Constitutional: She is oriented to person, place, and time. She appears well-developed and well-nourished. No distress.   HENT:   Head: Normocephalic and atraumatic.   Right Ear: External ear normal.   Left Ear: External ear normal.   Nose: Nose normal.   Eyes: Pupils are equal, round, and reactive to light. Conjunctivae and EOM are normal.   Neck: Normal range of motion. Neck supple. No tracheal deviation present. No thyromegaly present.   Cardiovascular: Normal rate, regular rhythm and normal heart sounds.    No murmur heard.  Pulmonary/Chest: Effort normal and breath sounds normal. No respiratory distress. She has no wheezes.   Abdominal: Soft. Bowel sounds are normal. There is no tenderness. There is no rebound and no guarding.   Musculoskeletal: Normal range of motion. She exhibits no edema, tenderness or deformity.   Neurological: She is alert and oriented to person, place, and time. No cranial nerve deficit.   Skin: Skin is warm and dry. No rash noted.   Psychiatric: She has a normal mood and affect. Her " behavior is normal. Judgment and thought content normal.       Lab Review  Glucose (mg/dL)   Date Value   07/18/2018 164 (H)   05/30/2018 161 (H)   01/24/2018 147 (H)     Sodium (mmol/L)   Date Value   07/18/2018 136 (L)   05/30/2018 134   01/24/2018 135     Potassium (mmol/L)   Date Value   07/18/2018 4.4   05/30/2018 4.9   01/24/2018 4.4     Chloride (mmol/L)   Date Value   07/18/2018 99   05/30/2018 96 (L)   01/24/2018 98 (L)     CO2 (mmol/L)   Date Value   07/18/2018 28.0   05/30/2018 25.0   01/24/2018 27.0     BUN (mg/dL)   Date Value   07/18/2018 16   05/30/2018 22   01/24/2018 17     Creatinine (mg/dL)   Date Value   07/18/2018 0.72   05/30/2018 1.40 (H)   01/24/2018 0.60     Hemoglobin A1C (%)   Date Value   07/18/2018 6.9 (H)   01/24/2018 7.1 (H)   07/19/2017 7.20 (H)     Triglycerides   Date Value   01/24/2018 92 mg/dL   07/20/2016 143 mg/dl   07/20/2016 147 mg/dl   01/04/2016 105 mg/dl     LDLCALC ELECT (mg/dl)   Date Value   07/20/2016 103     LDL Cholesterol    Date Value   01/24/2018 90 mg/dL   07/20/2016 90 mg/dl   01/04/2016 77 mg/dl   09/17/2015 70 mg/dl       Assessment/Plan      1. Controlled type 2 diabetes mellitus without complication, with long-term current use of insulin (CMS/[facility])    2. Vitamin D deficiency    3. Mixed hyperlipidemia    4. Essential hypertension    .    Medications prescribed:  Outpatient Encounter Prescriptions as of 7/25/2018   Medication Sig Dispense Refill   • albuterol (VENTOLIN HFA) 108 (90 Base) MCG/ACT inhaler Inhale 2 puffs Every 6 (Six) Hours As Needed for Wheezing or Shortness of Air. 1 inhaler 3   • amLODIPine (NORVASC) 5 MG tablet Take 1 tablet by mouth Daily. 30 tablet 5   • aspirin 81 MG tablet Take 81 mg by mouth daily.     • B-D ULTRAFINE III SHORT PEN 31G X 8 MM misc Use and discard 1 pen needle 4 times daily. 200 each 5   • Calcium Carbonate (CALCIUM 600 PO) Take 2 tablets by mouth daily.     • diclofenac (VOLTAREN) 1 % gel gel Apply 4 g topically 4 (Four)  "Times a Day. 100 g 11   • DULoxetine (CYMBALTA) 60 MG capsule TAKE 1 CAPSULE DAILY 90 capsule 1   • EASY TOUCH LANCETS 28G/TWIST misc TEST FOUR TIMES A  each 11   • ergocalciferol (ERGOCALCIFEROL) 52326 units capsule Take 1 capsule by mouth 1 (One) Time Per Week. 12 capsule 11   • famciclovir (FAMVIR) 250 MG tablet Take 1 tablet by mouth 3 (Three) Times a Day. 15 tablet 0   • fluticasone (FLONASE) 50 MCG/ACT nasal spray SPRAY 1 SPRAY INTO EACH NOSTRIL BIDM 16 g 5   • gabapentin (NEURONTIN) 300 MG capsule 1 capsule TID 90 capsule 0   • hydrALAZINE (APRESOLINE) 50 MG tablet   0   • HYDROcodone-acetaminophen (NORCO) 7.5-325 MG per tablet Take 1 tablet by mouth 2 (Two) Times a Day. 60 tablet 0   • insulin aspart (novoLOG FLEXPEN) 100 UNIT/ML solution pen-injector sc pen Inject 2-8 Units under the skin 3 (Three) Times a Day With Meals. INJECT 2-8 UNITS THREE TIMES A DAY BEFORE MEALS 15 pen 3   • Insulin Glargine (LANTUS SOLOSTAR) 100 UNIT/ML injection pen Inject 15 Units under the skin Every Night. 15 pen 1   • levothyroxine (SYNTHROID, LEVOTHROID) 100 MCG tablet TAKE 1 TABLET DAILY 90 tablet 1   • LORazepam (ATIVAN) 1 MG tablet Take 1 tablet by mouth At Night As Needed for Anxiety or Sleep. 30 tablet 2   • metFORMIN (GLUCOPHAGE) 850 MG tablet TAKE 1 TABLET TWICE A DAY  WITH MEALS 180 tablet 1   • montelukast (SINGULAIR) 10 MG tablet TAKE 1 TABLET BY MOUTH EVERY NIGHT  DAYS. 30 tablet 5   • Needle, Disp, 31G X 5/16\" misc 1 each 4 (Four) Times a Day. Pen Needle; 400 each 3   • nitrofurantoin (MACRODANTIN) 100 MG capsule 1 capsule twice a day 20 capsule 0   • traZODone (DESYREL) 50 MG tablet TAKE 1 TO 2 TABLETS NIGHTLYAT BEDTIME 180 tablet 0   • TRUE METRIX BLOOD GLUCOSE TEST test strip USE TO TEST FOUR TIMES DAILY 150 each 11   • [DISCONTINUED] ergocalciferol (ERGOCALCIFEROL) 85160 UNITS capsule Take 1 capsule by mouth 1 (One) Time Per Week. 12 capsule 3   • [DISCONTINUED] levoFLOXacin (LEVAQUIN) 250 MG " tablet Take 1 tablet by mouth Daily. 5 tablet 0   • [DISCONTINUED] levoFLOXacin (LEVAQUIN) 250 MG tablet Take 1 tablet by mouth Daily. 5 tablet 0   • [DISCONTINUED] LORazepam (ATIVAN) 1 MG tablet Take 1 tablet by mouth At Night As Needed for Anxiety or Sleep. 30 tablet 2   • [DISCONTINUED] valsartan (DIOVAN) 160 MG tablet Take 1 tablet by mouth Daily. 30 tablet 5     No facility-administered encounter medications on file as of 7/25/2018.        Orders placed during this encounter include:  Orders Placed This Encounter   Procedures   • Comprehensive Metabolic Panel   • Hemoglobin A1c   • Vitamin D 25 Hydroxy   • Lipid Panel   • TSH   • Vitamin B12   • CBC & Differential     Order Specific Question:   Manual Differential     Answer:   No     Glycemic Management     Lab Results   Component Value Date    HGBA1C 6.9 (H) 07/18/2018                Novolog mix stopped   Januvia stopped it due to headaches  Glimepiride stopped caused higher sugars         Metformin 850 mg one tablet po BID         Lantus 15 units --changed to Basaglar      Novolog for meals      Breakfast -- 7     Lunch -- 8     Supper -- 9         Approve for [employer] Personal Use        #1  Patient has diabetes mellitus, insulin-dependent.     #2 She performs blood glucose testing 4 times daily and blood glucose log was brought to office with variability from .     #3  She is requiring  Basal insulin  and Prandial Insulin 3 times daily for a total of 4 injections per day.     #4 Based on blood glucose readings we are making adjustments.      #5 I have personally seen patient within the past 6 months     #6 We plan on seeing her every 2-3 months for continuous adjustment of her diabetes regimen      #7 patient has hypoglycemia with unawareness.     #8 patient has day-to-day variation in her mealtime which confounds the degree of insulin dosing with multiple daily injections.     #9 patient has completed diabetes education program with us.     #10 she has  demonstrated the ability to self monitor her glucose.         #11 Patient is motivated in improving  diabetes control               Lipid Management      Simvastatin 20 mg one at bedtime      Lipid at goal            Total Cholesterol   Date Value Ref Range Status   01/24/2018 183 150 - 200 mg/dL Final            Triglycerides   Date Value Ref Range Status   01/24/2018 92 35 - 160 mg/dL Final            HDL Cholesterol   Date Value Ref Range Status   01/24/2018 75 35 - 100 mg/dL Final            Blood Pressure Management      Lisinopril 20 mg daily-- stopped    amlodipine 5 mg daily     Valsartan -- on recall          Microvascular Complication Monitoring      Cymbalta 60 mg one daily      Gabapentin 300 mg one TID        hydrocodone-acetaminophen 7.5- 500 mg 2 times daily      3- 15 Microalbuminuria            Bone Health      h o vitamin d def      vitamin d 50,000 units weekly      Component      Latest Ref Rng & Units 7/18/2018   25 Hydroxy, Vitamin D      30.0 - 100.0 ng/ml 46.2           Immunization: influenza vaccine Oct. 2017 , shingles vaccine Oct. 2016         Other Diabetes Related Aspects         Hypothyroidism           Levothyroxine 100 mcg one daily Monday through Friday and 1/2 tablet on Saturday and none on Sunday        Lab Results   Component Value Date    TSH 2.500 07/18/2018              4. Follow-up: Return in about 6 months (around 1/25/2019) for Recheck.

## 2018-07-27 ENCOUNTER — DOCUMENTATION (OUTPATIENT)
Dept: ENDOCRINOLOGY | Facility: CLINIC | Age: 83
End: 2018-07-27

## 2018-07-27 RX ORDER — GABAPENTIN 300 MG/1
CAPSULE ORAL
Qty: 90 CAPSULE | Refills: 1 | Status: SHIPPED | OUTPATIENT
Start: 2018-07-27 | End: 2018-09-20 | Stop reason: SDUPTHER

## 2018-07-27 RX ORDER — HYDROCODONE BITARTRATE AND ACETAMINOPHEN 7.5; 325 MG/1; MG/1
1 TABLET ORAL
Qty: 60 TABLET | Refills: 0 | Status: SHIPPED | OUTPATIENT
Start: 2018-07-27 | End: 2018-12-20 | Stop reason: SDUPTHER

## 2018-07-31 ENCOUNTER — TELEPHONE (OUTPATIENT)
Dept: FAMILY MEDICINE CLINIC | Facility: CLINIC | Age: 83
End: 2018-07-31

## 2018-08-03 ENCOUNTER — OFFICE VISIT (OUTPATIENT)
Dept: FAMILY MEDICINE CLINIC | Facility: CLINIC | Age: 83
End: 2018-08-03

## 2018-08-03 VITALS
SYSTOLIC BLOOD PRESSURE: 160 MMHG | TEMPERATURE: 96.6 F | WEIGHT: 184 LBS | BODY MASS INDEX: 30.66 KG/M2 | OXYGEN SATURATION: 99 % | HEIGHT: 65 IN | HEART RATE: 80 BPM | DIASTOLIC BLOOD PRESSURE: 82 MMHG

## 2018-08-03 DIAGNOSIS — I10 ESSENTIAL HYPERTENSION: Primary | ICD-10-CM

## 2018-08-03 PROCEDURE — 99213 OFFICE O/P EST LOW 20 MIN: CPT | Performed by: FAMILY MEDICINE

## 2018-08-03 RX ORDER — AMLODIPINE BESYLATE 10 MG/1
10 TABLET ORAL DAILY
Qty: 30 TABLET | Refills: 0 | Status: SHIPPED | OUTPATIENT
Start: 2018-08-03 | End: 2018-08-27 | Stop reason: SDUPTHER

## 2018-08-03 NOTE — PROGRESS NOTES
"Subjective   Chief Complaint   Patient presents with   • Hypertension     [facility] pt     Nylasimon Piña is a 83 y.o. female.   Hypertension ([facility] pt)    History of Present Illness     Has complaints of hypertension  Daily readings are around 120/80 normally however recently it has been uncontrolled  Today her blood pressure reading at home was 179/82  Denies any complaints  She admits to feeling worried about her blood pressure  She is currently managed with avapro, norvasc  She was recently changed from avapro due to the diovan being recalled    The following portions of the patient's history were reviewed and updated as appropriate: allergies, current medications, past family history, past medical history, past social history, past surgical history and problem list.    Review of Systems   Constitutional: Negative for appetite change, chills, fatigue and fever.   HENT: Negative for congestion, ear pain, rhinorrhea and sore throat.    Eyes: Negative for pain.   Respiratory: Negative for cough and shortness of breath.    Cardiovascular: Negative for chest pain and palpitations.   Gastrointestinal: Negative for abdominal pain, constipation, diarrhea and nausea.   Genitourinary: Negative for dysuria.   Musculoskeletal: Negative for back pain, joint swelling and neck pain.   Skin: Negative for rash.   Neurological: Negative for dizziness and headaches.       Objective   /82   Pulse 80   Temp 96.6 °F (35.9 °C)   Ht 165.1 cm (65\")   Wt 83.5 kg (184 lb)   SpO2 99%   BMI 30.62 kg/m²   Physical Exam   Constitutional: She is oriented to person, place, and time. She appears well-developed and well-nourished.   HENT:   Head: Normocephalic and atraumatic.   Eyes: Pupils are equal, round, and reactive to light.   Neck: Normal range of motion. Neck supple.   Cardiovascular: Normal rate, regular rhythm and normal heart sounds.    Pulmonary/Chest: Effort normal and breath sounds normal. No respiratory distress. She " has no wheezes. She has no rales.   Abdominal: Soft. Bowel sounds are normal.   Musculoskeletal: Normal range of motion.   Neurological: She is alert and oriented to person, place, and time.   Skin: Skin is warm and dry.   Psychiatric: She has a normal mood and affect.   Nursing note and vitals reviewed.      Assessment/Plan   Problems Addressed this Visit        Cardiovascular and Mediastinum    Essential hypertension - Primary    Relevant Medications    amLODIPine (NORVASC) 10 MG tablet        Continue with avapro  Adjusted norvasc  Recheck bp next week

## 2018-08-08 ENCOUNTER — OFFICE VISIT (OUTPATIENT)
Dept: ORTHOPEDIC SURGERY | Facility: CLINIC | Age: 83
End: 2018-08-08

## 2018-08-08 ENCOUNTER — OFFICE VISIT (OUTPATIENT)
Dept: FAMILY MEDICINE CLINIC | Facility: CLINIC | Age: 83
End: 2018-08-08

## 2018-08-08 VITALS — BODY MASS INDEX: 30.66 KG/M2 | HEIGHT: 65 IN | WEIGHT: 184 LBS

## 2018-08-08 VITALS
DIASTOLIC BLOOD PRESSURE: 62 MMHG | BODY MASS INDEX: 30.66 KG/M2 | HEIGHT: 65 IN | WEIGHT: 184 LBS | SYSTOLIC BLOOD PRESSURE: 112 MMHG

## 2018-08-08 DIAGNOSIS — M17.0 PRIMARY OSTEOARTHRITIS OF BOTH KNEES: Primary | ICD-10-CM

## 2018-08-08 DIAGNOSIS — M25.561 CHRONIC PAIN OF BOTH KNEES: ICD-10-CM

## 2018-08-08 DIAGNOSIS — I10 ESSENTIAL HYPERTENSION: Primary | ICD-10-CM

## 2018-08-08 DIAGNOSIS — G89.29 CHRONIC PAIN OF BOTH KNEES: ICD-10-CM

## 2018-08-08 DIAGNOSIS — Z79.4 CONTROLLED TYPE 2 DIABETES MELLITUS WITHOUT COMPLICATION, WITH LONG-TERM CURRENT USE OF INSULIN (HCC): ICD-10-CM

## 2018-08-08 DIAGNOSIS — M25.562 CHRONIC PAIN OF BOTH KNEES: ICD-10-CM

## 2018-08-08 DIAGNOSIS — E11.9 CONTROLLED TYPE 2 DIABETES MELLITUS WITHOUT COMPLICATION, WITH LONG-TERM CURRENT USE OF INSULIN (HCC): ICD-10-CM

## 2018-08-08 DIAGNOSIS — I10 ESSENTIAL HYPERTENSION: ICD-10-CM

## 2018-08-08 PROCEDURE — 99213 OFFICE O/P EST LOW 20 MIN: CPT | Performed by: ORTHOPAEDIC SURGERY

## 2018-08-08 PROCEDURE — 20610 DRAIN/INJ JOINT/BURSA W/O US: CPT | Performed by: ORTHOPAEDIC SURGERY

## 2018-08-08 PROCEDURE — 99213 OFFICE O/P EST LOW 20 MIN: CPT | Performed by: FAMILY MEDICINE

## 2018-08-08 RX ORDER — TRIAMCINOLONE ACETONIDE 40 MG/ML
40 INJECTION, SUSPENSION INTRA-ARTICULAR; INTRAMUSCULAR
Status: COMPLETED | OUTPATIENT
Start: 2018-08-08 | End: 2018-08-08

## 2018-08-08 RX ORDER — LIDOCAINE HYDROCHLORIDE 20 MG/ML
2 INJECTION, SOLUTION INFILTRATION; PERINEURAL
Status: COMPLETED | OUTPATIENT
Start: 2018-08-08 | End: 2018-08-08

## 2018-08-08 RX ADMIN — TRIAMCINOLONE ACETONIDE 40 MG: 40 INJECTION, SUSPENSION INTRA-ARTICULAR; INTRAMUSCULAR at 09:48

## 2018-08-08 RX ADMIN — LIDOCAINE HYDROCHLORIDE 2 ML: 20 INJECTION, SOLUTION INFILTRATION; PERINEURAL at 09:48

## 2018-08-08 NOTE — PROGRESS NOTES
"Nyla Piña is a 83 y.o. female returns for     Chief Complaint   Patient presents with   • Left Knee - Follow-up, Pain   • Right Knee - Pain, Follow-up       HISTORY OF PRESENT ILLNESS: f/u bilateral knee pain, patient requesting evaluation for steroid injections. synvisc one injection right knee done on 2/6/2018, last steroid injection right knee done on 1/5/2018  Pain with walking  Using a cane  Pain with ADL's  Mostly dull aches but have occasional sharp pains too.  No new injury       CONCURRENT MEDICAL HISTORY:    The following portions of the patient's history were reviewed and updated as appropriate: allergies, current medications, past family history, past medical history, past social history, past surgical history and problem list.     ROS  No fevers or chills.  No chest pain or shortness of air.  No GI or  disturbances.    PHYSICAL EXAMINATION:       Ht 165.1 cm (65\")   Wt 83.5 kg (184 lb)   BMI 30.62 kg/m²     Physical Exam   Constitutional: She is oriented to person, place, and time. She appears well-developed and well-nourished.   Neurological: She is alert and oriented to person, place, and time.   Psychiatric: She has a normal mood and affect. Her behavior is normal. Judgment and thought content normal.       GAIT:     []  Normal  [x]  Antalgic    Assistive device: []  None  []  Walker     []  Crutches  [x]  Cane     []  Wheelchair  []  Stretcher    Right Knee Exam     Tenderness   Right knee tenderness location: diffuse.    Range of Motion   Extension: -5   Flexion: 120     Muscle Strength     The patient has normal right knee strength.    Tests   Drawer:       Anterior - negative    Posterior - negative  Varus: negative  Valgus: negative    Other   Sensation: normal  Pulse: present  Swelling: mild    Comments:  Crepitation on motion.  Mild to moderate pain through arc of motion      Left Knee Exam     Tenderness   Left knee tenderness location: diffuse.    Range of Motion   Extension: -5 "   Flexion: 120     Muscle Strength     The patient has normal left knee strength.    Tests   Drawer:       Anterior - negative     Posterior - negative  Varus: negative  Valgus: negative    Other   Sensation: normal  Pulse: present  Swelling: none    Comments:  Crepitation with motion  Mild to moderate pain through arc of motion                  Large Joint Arthrocentesis  Date/Time: 8/8/2018 9:48 AM  Consent given by: patient  Site marked: site marked  Timeout: Immediately prior to procedure a time out was called to verify the correct patient, procedure, equipment, support staff and site/side marked as required   Supporting Documentation  Indications: pain   Procedure Details  Location: knee - R knee  Preparation: Patient was prepped and draped in the usual sterile fashion  Needle size: 22 G  Approach: anteromedial  Medications administered: 40 mg triamcinolone acetonide 40 MG/ML; 2 mL lidocaine 2%  Patient tolerance: patient tolerated the procedure well with no immediate complications    Large Joint Arthrocentesis  Date/Time: 8/8/2018 9:48 AM  Consent given by: patient  Site marked: site marked  Timeout: Immediately prior to procedure a time out was called to verify the correct patient, procedure, equipment, support staff and site/side marked as required   Supporting Documentation  Indications: pain   Procedure Details  Location: knee - L knee  Preparation: Patient was prepped and draped in the usual sterile fashion  Needle size: 22 G  Approach: anteromedial  Medications administered: 40 mg triamcinolone acetonide 40 MG/ML; 2 mL lidocaine 2%  Patient tolerance: patient tolerated the procedure well with no immediate complications              ASSESSMENT:    Diagnoses and all orders for this visit:    Primary osteoarthritis of both knees  -     Large Joint Arthrocentesis  -     Large Joint Arthrocentesis    Chronic pain of both knees  -     Large Joint Arthrocentesis  -     Large Joint Arthrocentesis    Controlled  type 2 diabetes mellitus without complication, with long-term current use of insulin (CMS/[facility])    Essential hypertension          PLAN    Inject both knees today.  Continue with ROM/STR exercises as tolerated.  Activity as tolerated  Continue moving and using cane for support.  Repeat xrays of both knees when she returns.    Return if symptoms worsen or fail to improve, for Recheck with repeat xrays both knees.    Hieu Lozano MD

## 2018-08-24 RX ORDER — GLUCOSAM/CHON-MSM1/C/MANG/BOSW 500-416.6
TABLET ORAL
Qty: 150 EACH | Refills: 11 | Status: SHIPPED | OUTPATIENT
Start: 2018-08-24

## 2018-08-27 RX ORDER — AMLODIPINE BESYLATE 10 MG/1
10 TABLET ORAL DAILY
Qty: 30 TABLET | Refills: 0 | Status: SHIPPED | OUTPATIENT
Start: 2018-08-27 | End: 2018-09-25 | Stop reason: SDUPTHER

## 2018-09-20 ENCOUNTER — OFFICE VISIT (OUTPATIENT)
Dept: FAMILY MEDICINE CLINIC | Facility: CLINIC | Age: 83
End: 2018-09-20

## 2018-09-20 VITALS
DIASTOLIC BLOOD PRESSURE: 82 MMHG | BODY MASS INDEX: 30.66 KG/M2 | SYSTOLIC BLOOD PRESSURE: 130 MMHG | WEIGHT: 184 LBS | HEIGHT: 65 IN

## 2018-09-20 DIAGNOSIS — F41.9 ANXIETY: Primary | ICD-10-CM

## 2018-09-20 DIAGNOSIS — R22.43 LOCALIZED SWELLING OF BOTH LOWER LEGS: ICD-10-CM

## 2018-09-20 DIAGNOSIS — F33.1 MODERATE EPISODE OF RECURRENT MAJOR DEPRESSIVE DISORDER (HCC): ICD-10-CM

## 2018-09-20 DIAGNOSIS — M26.609 TMJ (TEMPOROMANDIBULAR JOINT DISORDER): ICD-10-CM

## 2018-09-20 DIAGNOSIS — M15.9 OSTEOARTHRITIS OF MULTIPLE JOINTS, UNSPECIFIED OSTEOARTHRITIS TYPE: ICD-10-CM

## 2018-09-20 PROCEDURE — 96372 THER/PROPH/DIAG INJ SC/IM: CPT | Performed by: FAMILY MEDICINE

## 2018-09-20 PROCEDURE — 99214 OFFICE O/P EST MOD 30 MIN: CPT | Performed by: FAMILY MEDICINE

## 2018-09-20 RX ORDER — LORAZEPAM 1 MG/1
1 TABLET ORAL NIGHTLY PRN
Qty: 30 TABLET | Refills: 2 | Status: SHIPPED | OUTPATIENT
Start: 2018-09-20 | End: 2018-12-21 | Stop reason: SDUPTHER

## 2018-09-20 RX ORDER — GABAPENTIN 300 MG/1
CAPSULE ORAL
Qty: 90 CAPSULE | Refills: 1 | Status: SHIPPED | OUTPATIENT
Start: 2018-09-20 | End: 2018-11-19 | Stop reason: SDUPTHER

## 2018-09-20 RX ORDER — INSULIN GLARGINE 100 [IU]/ML
INJECTION, SOLUTION SUBCUTANEOUS
COMMUNITY
Start: 2018-08-20 | End: 2019-05-23 | Stop reason: CLARIF

## 2018-09-20 RX ORDER — METHYLPREDNISOLONE ACETATE 80 MG/ML
80 INJECTION, SUSPENSION INTRA-ARTICULAR; INTRALESIONAL; INTRAMUSCULAR; SOFT TISSUE ONCE
Status: COMPLETED | OUTPATIENT
Start: 2018-09-20 | End: 2018-09-20

## 2018-09-20 RX ADMIN — METHYLPREDNISOLONE ACETATE 80 MG: 80 INJECTION, SUSPENSION INTRA-ARTICULAR; INTRALESIONAL; INTRAMUSCULAR; SOFT TISSUE at 11:13

## 2018-09-20 NOTE — PROGRESS NOTES
Subjective   Nyla Piña is a 83 y.o. female who presents to the office for a few concerns.  She needs a refill of medications for anxiety, which she has had basically since the death of her .  She remains very lonely and her children live out of state.  We isolated and somewhat depressed.  She's on Cymbalta and doesn't really wish to raise the dose of it.  She feels that this is something she just needs to work through.  Her children have asked her to move that she really doesn't wish to leave her home.    She's noted a pressure just in front of the right ear at times.  She says is not really down in the year but sometimes she can push on it and feels like something is moving.    She continues to have swelling of the legs, with the left being worse than the right and she's had some previous surgery on the left leg however.    History of Present Illness   Hypertension   This is a chronic problem. The current episode started more than 1 year ago. The problem has been waxing and waning since onset. Associated symptoms include anxiety, headaches and malaise/fatigue. Pertinent negatives include no blurred vision, chest pain, neck pain, orthopnea, palpitations, peripheral edema, PND, shortness of breath or sweats. There are no associated agents to hypertension. Risk factors for coronary artery disease include dyslipidemia and family history. Past treatments include calcium channel blockers, ARB's.  The current treatment provides moderate improvement. There are no compliance problems.      The following portions of the patient's history were reviewed and updated as appropriate: allergies, current medications, past family history, past medical history, past social history, past surgical history and problem list.    Review of Systems   HENT: Negative.    Respiratory: Positive for cough.    Cardiovascular: Negative.    Gastrointestinal: Negative.    Genitourinary: Negative for flank pain, frequency, hematuria and  urgency.   Musculoskeletal: Positive for gait problem.   Skin: Negative.    Allergic/Immunologic: Negative for immunocompromised state.   Neurological: Negative for dizziness, tremors, seizures and syncope.   Hematological: Negative.    Psychiatric/Behavioral: Positive for sleep disturbance. Negative for agitation, confusion and dysphoric mood. The patient is not nervous/anxious.    All other systems reviewed and are negative.    Objective   Physical Exam   Constitutional: She is oriented to person, place, and time. She appears well-developed and well-nourished.   HENT:   Head: Normocephalic and atraumatic.   Nose: Nose normal.   Mouth/Throat: Oropharynx is clear and moist.   Mild tenderness of the TMJ joint on the right is noted on palpation   Eyes: Pupils are equal, round, and reactive to light. Conjunctivae and EOM are normal.   Neck: Normal range of motion. Neck supple. No JVD present. No tracheal deviation present. No thyromegaly present.   Cardiovascular: Regular rhythm and intact distal pulses.    Murmur heard.  2/6 holosystolic murmur is noted today   Pulmonary/Chest: Effort normal and breath sounds normal. She has no wheezes.   Abdominal: Soft. Bowel sounds are normal. She exhibits no distension. There is no tenderness.   Musculoskeletal: She exhibits tenderness. She exhibits no edema.   Lymphadenopathy:     She has no cervical adenopathy.   Neurological: She is alert and oriented to person, place, and time. Coordination normal.   Skin: Skin is warm and dry. No rash noted.       Psychiatric: She has a normal mood and affect.   Nursing note and vitals reviewed.    Assessment/Plan   Nyla was seen today for med refill.    Diagnoses and all orders for this visit:    Anxiety    Osteoarthritis of multiple joints, unspecified osteoarthritis type  -     methylPREDNISolone acetate (DEPO-medrol) injection 80 mg; Inject 1 mL into the appropriate muscle as directed by prescriber 1 (One) Time.    Moderate episode of  recurrent major depressive disorder (CMS/HCC)    TMJ (temporomandibular joint disorder)    Localized swelling of both lower legs    Other orders  -     LORazepam (ATIVAN) 1 MG tablet; Take 1 tablet by mouth At Night As Needed for Anxiety or Sleep.  -     gabapentin (NEURONTIN) 300 MG capsule; 1 capsule TID    The patient has read and signed the [facility] Controlled Substance Contract.  I will continue to see patient for regular follow up appointments. Patient is well controlled on the medication.  ANGELITO has been reviewed by me and is updated every 3 months. The patient is aware of the potential for addiction and dependence.    Continue lorazepam when needed for anxiety.    Gave Depo-Medrol 80 mg IM for arthritis including suspected TMJ joint arthritis and diffuse arthritis and we'll continue gabapentin for the arthritis pain as well.  She has some pain pills that she takes only when needed for the more severe episodes of pain but doesn't need a refill of them today.    Consider an increase in her Cymbalta and she will let me know if she decides to do so.    She is advised to elevate the legs, wear compression stockings at times of a lot of walking or standing        This document has been electronically signed by Chantel Long MD on September 20, 2018 10:53 AM

## 2018-09-25 RX ORDER — AMLODIPINE BESYLATE 10 MG/1
10 TABLET ORAL DAILY
Qty: 30 TABLET | Refills: 0 | Status: SHIPPED | OUTPATIENT
Start: 2018-09-25 | End: 2018-11-09 | Stop reason: SDUPTHER

## 2018-09-27 ENCOUNTER — HOSPITAL ENCOUNTER (OUTPATIENT)
Dept: CARDIOLOGY | Facility: HOSPITAL | Age: 83
Discharge: HOME | End: 2018-09-27

## 2018-09-27 ENCOUNTER — DOCUMENTATION (OUTPATIENT)
Dept: CARDIOLOGY | Facility: CLINIC | Age: 83
End: 2018-09-27

## 2018-09-27 ENCOUNTER — HOSPITAL ENCOUNTER (OUTPATIENT)
Dept: NUCLEAR MEDICINE | Facility: HOSPITAL | Age: 83
Discharge: HOME | End: 2018-09-27

## 2018-09-27 DIAGNOSIS — I10 ESSENTIAL HYPERTENSION: ICD-10-CM

## 2018-09-27 DIAGNOSIS — R06.00 DYSPNEA, UNSPECIFIED TYPE: ICD-10-CM

## 2018-09-27 DIAGNOSIS — Z79.4 TYPE 2 DIABETES MELLITUS WITHOUT COMPLICATION, WITH LONG-TERM CURRENT USE OF INSULIN (HCC): ICD-10-CM

## 2018-09-27 DIAGNOSIS — E11.9 TYPE 2 DIABETES MELLITUS WITHOUT COMPLICATION, WITH LONG-TERM CURRENT USE OF INSULIN (HCC): ICD-10-CM

## 2018-09-27 LAB
BH CV STRESS BP STAGE 1: NORMAL
BH CV STRESS COMMENTS STAGE 1: NORMAL
BH CV STRESS DOSE REGADENOSON STAGE 1: 0.4
BH CV STRESS DURATION MIN STAGE 1: 0
BH CV STRESS DURATION SEC STAGE 1: 10
BH CV STRESS HR STAGE 1: 65
BH CV STRESS PROTOCOL 1: NORMAL
BH CV STRESS RECOVERY BP: NORMAL MMHG
BH CV STRESS RECOVERY HR: 78 BPM
BH CV STRESS STAGE 1: 1
LV EF NUC BP: 70 %
MAXIMAL PREDICTED HEART RATE: 139 BPM
PERCENT MAX PREDICTED HR: 64.03 %
STRESS BASELINE BP: NORMAL MMHG
STRESS BASELINE HR: 70 BPM
STRESS PERCENT HR: 75 %
STRESS POST ESTIMATED WORKLOAD: 1 METS
STRESS POST PEAK BP: NORMAL MMHG
STRESS POST PEAK HR: 89 BPM
STRESS TARGET HR: 118 BPM

## 2018-09-27 PROCEDURE — 78452 HT MUSCLE IMAGE SPECT MULT: CPT | Performed by: INTERNAL MEDICINE

## 2018-09-27 PROCEDURE — 93016 CV STRESS TEST SUPVJ ONLY: CPT | Performed by: INTERNAL MEDICINE

## 2018-09-27 PROCEDURE — A9500 TC99M SESTAMIBI: HCPCS | Performed by: INTERNAL MEDICINE

## 2018-09-27 PROCEDURE — 93018 CV STRESS TEST I&R ONLY: CPT | Performed by: INTERNAL MEDICINE

## 2018-09-27 PROCEDURE — 93017 CV STRESS TEST TRACING ONLY: CPT

## 2018-09-27 PROCEDURE — 25010000002 REGADENOSON 0.4 MG/5ML SOLUTION: Performed by: INTERNAL MEDICINE

## 2018-09-27 PROCEDURE — 0 TECHNETIUM SESTAMIBI: Performed by: INTERNAL MEDICINE

## 2018-09-27 PROCEDURE — 78452 HT MUSCLE IMAGE SPECT MULT: CPT

## 2018-09-27 RX ORDER — 0.9 % SODIUM CHLORIDE 0.9 %
10 VIAL (ML) INJECTION AS NEEDED
Status: DISCONTINUED | OUTPATIENT
Start: 2018-09-27 | End: 2018-09-28 | Stop reason: HOSPADM

## 2018-09-27 RX ADMIN — SODIUM CHLORIDE, PRESERVATIVE FREE 10 ML: 5 INJECTION INTRAVENOUS at 09:49

## 2018-09-27 RX ADMIN — TECHNETIUM TC 99M SESTAMIBI 1 DOSE: 1 INJECTION INTRAVENOUS at 09:50

## 2018-09-27 RX ADMIN — REGADENOSON 0.4 MG: 0.08 INJECTION, SOLUTION INTRAVENOUS at 09:49

## 2018-09-27 RX ADMIN — TECHNETIUM TC 99M SESTAMIBI 1 DOSE: 1 INJECTION INTRAVENOUS at 08:06

## 2018-10-19 ENCOUNTER — CLINICAL SUPPORT (OUTPATIENT)
Dept: FAMILY MEDICINE CLINIC | Facility: CLINIC | Age: 83
End: 2018-10-19

## 2018-10-19 DIAGNOSIS — Z23 NEED FOR VACCINATION: Primary | ICD-10-CM

## 2018-10-19 PROCEDURE — G0008 ADMIN INFLUENZA VIRUS VAC: HCPCS | Performed by: FAMILY MEDICINE

## 2018-10-19 PROCEDURE — 90662 IIV NO PRSV INCREASED AG IM: CPT | Performed by: FAMILY MEDICINE

## 2018-10-25 RX ORDER — AMLODIPINE BESYLATE 10 MG/1
10 TABLET ORAL DAILY
Qty: 30 TABLET | Refills: 0 | OUTPATIENT
Start: 2018-10-25

## 2018-10-29 ENCOUNTER — OFFICE VISIT (OUTPATIENT)
Dept: CARDIOLOGY | Facility: CLINIC | Age: 83
End: 2018-10-29

## 2018-10-29 VITALS
HEART RATE: 71 BPM | WEIGHT: 188 LBS | BODY MASS INDEX: 31.32 KG/M2 | DIASTOLIC BLOOD PRESSURE: 84 MMHG | HEIGHT: 65 IN | SYSTOLIC BLOOD PRESSURE: 124 MMHG | OXYGEN SATURATION: 99 %

## 2018-10-29 DIAGNOSIS — R00.0 TACHYCARDIA: ICD-10-CM

## 2018-10-29 DIAGNOSIS — R06.00 DYSPNEA, UNSPECIFIED TYPE: ICD-10-CM

## 2018-10-29 DIAGNOSIS — E11.42 DIABETIC POLYNEUROPATHY ASSOCIATED WITH TYPE 2 DIABETES MELLITUS (HCC): Primary | ICD-10-CM

## 2018-10-29 DIAGNOSIS — R01.1 CARDIAC MURMUR: ICD-10-CM

## 2018-10-29 DIAGNOSIS — E78.2 MIXED HYPERLIPIDEMIA: ICD-10-CM

## 2018-10-29 DIAGNOSIS — I10 ESSENTIAL HYPERTENSION: ICD-10-CM

## 2018-10-29 PROCEDURE — 99213 OFFICE O/P EST LOW 20 MIN: CPT | Performed by: INTERNAL MEDICINE

## 2018-10-29 RX ORDER — METOPROLOL SUCCINATE 25 MG/1
25 TABLET, EXTENDED RELEASE ORAL DAILY
Qty: 90 TABLET | Refills: 3 | Status: SHIPPED | OUTPATIENT
Start: 2018-10-29 | End: 2019-11-08 | Stop reason: SDUPTHER

## 2018-10-29 NOTE — PROGRESS NOTES
Nyla Piña  83 y.o. female    10/29/2018  1. Diabetic polyneuropathy associated with type 2 diabetes mellitus (CMS/HCC)    2. Mixed hyperlipidemia    3. Essential hypertension    4. Tachycardia    5. Cardiac murmur    6. Dyspnea, unspecified type        History of Present Illness:  82 years old patient physically active with long-standing history of diabetes on insulin regimen, hypothyroidism on hormone replacement, hyperlipidemia, chronic back pain, peripheral neuropathy who referred for evaluations of cardiac murmur, increasing fatigability, mild dyspnea on exertion and noted to have sinus tachycardia on the EKG.  The patient denies orthopnea PND or chest pain.  Denies syncope or near syncopal episode.  Had history of back pain but able to take care of herself very well.  No Syncope or near syncopal episode reported.  Patient underwent Holter monitor echocardiographic study.  Holter monitor reported to have nonsustained wide complex tachycardia 8 week at rate of approximately 1 90 bpm and nonsustained atrial tachycardia longest was 14 beat at rate approximately 1 40 bpm.    TSH level within normal range and lipid profile also within the recommended range.  No fever cough which was reported.  No dysuria or hematuria or bright red blood per rectum reported.  No intermittent claudication reported.     1/2018  Total Cholesterol 150 - 200 mg/dL 183    Triglycerides 35 - 160 mg/dL 92    HDL Cholesterol 35 - 100 mg/dL 75    LDL Cholesterol  mg/dL 90    VLDL Cholesterol mg/dL 18.4    LDL/HDL Ratio   1.19       TSH 0.460 - 4.680 mIU/mL 1.130          STRESS TEST 9/2018  · Findings consistent with an equivocal ECG stress test.  · Myocardial perfusion imaging indicates a normal myocardial perfusion study with no evidence of ischemia.  · Impressions are consistent with a low risk study.  · Left ventricular ejection fraction is normal (Calculated EF = 70%).      Abnormal ZIO PATCH 4/2018  Predominant rhythm is sinus  with evidence of prolonged VA interval and nonsustained wide QRS comlex tachycardia 8 at rate of 190 to 210 bpm ..  There is nonsustained atrial tachycardia longest 14 beat at a rate approximate 140 bpm.  There are frequent premature supraventricular ventricle ectopic beats representing 3.5% of total atrial activity and a rare premature supraventricular couplet.  There are isolated premature ventricular complex proximate 3500 and with some bigeminy pattern.  No evidence significant bradyarrhythmia or pauses noted      4/2018    Left Ventricle Left ventricular systolic function is normal. Estimated EF was in agreement with the calculated EF. Estimated EF appears to be in the range of 56 - 60%. Estimated EF = 60%. Normal left ventricular cavity size noted. All left ventricular wall segments contract normally. Left ventricular wall thickness is consistent with mild concentric hypertrophy. Left ventricular diastolic dysfunction is noted (grade I) consistent with impaired relaxation.      Right Ventricle Normal right ventricular cavity size and systolic function noted.      Left Atrium Normal left atrial size and volume noted.      Right Atrium Normal right atrial size noted.      Aortic Valve The aortic valve is abnormal in structure. There is calcification of the aortic valve.No significant aortic valve regurgitation is present. Mild aortic valve stenosis is present.      Mitral Valve The mitral valve is grossly normal in structure. Trace mitral valve regurgitation is present.      Tricuspid Valve The tricuspid valve is grossly normal. Trace tricuspid valve regurgitation is present.      Pulmonic Valve The pulmonic valve is grossly normal in structure. There is no significant pulmonic valve regurgitation present.      Greater Vessels No dilation of the aortic root is present. No dilation of the sinuses of Valsalva is present.      Pericardium There is no evidence of pericardial effusion                 SUBJECTIVE:    Allergies   Allergen Reactions   • Latex Itching   • Penicillins Swelling and Rash         Past Medical History:   Diagnosis Date   • Acute bronchitis    • Acute sinusitis    • Acute upper respiratory infection    • Anxiety    • Cellulitis of lower leg    • Chronic urinary tract infection    • Cough    • Death of     • Diabetic asymmetric polyneuropathy (CMS/[facility])    • Diabetic peripheral neuropathy (CMS/[facility])    • Diabetic polyneuropathy (CMS/[facility])    • Disease of nail    • Dorsalgia    • Flank pain    • Hashimoto's thyroiditis    • History of echocardiogram 02/19/2013   • Hypertensive disorder    • Insomnia    • Mixed hyperlipidemia    • Non-healing surgical wound    • Osteoarthritis of multiple joints    • Peripheral vascular disease (CMS/[facility])    • Seasonal allergic rhinitis    • Type II diabetes mellitus, uncontrolled (CMS/[facility])    • Upper respiratory infection    • Urinary tract infectious disease    • Urinary tract infectious disease    • Vitamin D deficiency          Past Surgical History:   Procedure Laterality Date   • EXCISION MASS LEG Left 11/05/2013    Excision of soft tissue mass of left leg   • HYSTERECTOMY     • STEROID INJECTION  02/08/2016    Depo Medrol (Methylprednisone) 80mg (Acute upper respiratory infection, unspecified)          No family history on file.      Social History     Social History   • Marital status:      Spouse name: N/A   • Number of children: N/A   • Years of education: N/A     Occupational History   • Not on file.     Social History Main Topics   • Smoking status: Never Smoker   • Smokeless tobacco: Never Used   • Alcohol use No   • Drug use: No   • Sexual activity: Defer     Other Topics Concern   • Not on file     Social History Narrative   • No narrative on file         Current Outpatient Prescriptions   Medication Sig Dispense Refill   • albuterol (VENTOLIN HFA) 108 (90 Base) MCG/ACT inhaler Inhale 2 puffs Every 6 (Six) Hours As Needed  "for Wheezing or Shortness of Air. 1 inhaler 3   • amLODIPine (NORVASC) 10 MG tablet TAKE 1 TABLET BY MOUTH DAILY. 30 tablet 0   • aspirin 81 MG tablet Take 81 mg by mouth daily.     • B-D ULTRAFINE III SHORT PEN 31G X 8 MM misc Use and discard 1 pen needle 4 times daily. 200 each 5   • Calcium Carbonate (CALCIUM 600 PO) Take 2 tablets by mouth daily.     • diclofenac (VOLTAREN) 1 % gel gel Apply 4 g topically 4 (Four) Times a Day. 100 g 11   • DULoxetine (CYMBALTA) 60 MG capsule TAKE 1 CAPSULE DAILY 90 capsule 1   • ergocalciferol (ERGOCALCIFEROL) 30222 units capsule Take 1 capsule by mouth 1 (One) Time Per Week. 12 capsule 11   • famciclovir (FAMVIR) 250 MG tablet Take 1 tablet by mouth 3 (Three) Times a Day. 15 tablet 0   • fluticasone (FLONASE) 50 MCG/ACT nasal spray SPRAY 1 SPRAY INTO EACH NOSTRIL BIDM 16 g 5   • gabapentin (NEURONTIN) 300 MG capsule 1 capsule TID 90 capsule 1   • HYDROcodone-acetaminophen (NORCO) 7.5-325 MG per tablet Take 1 tablet by mouth 2 (Two) Times a Day. 60 tablet 0   • insulin aspart (novoLOG FLEXPEN) 100 UNIT/ML solution pen-injector sc pen Inject 2-8 Units under the skin 3 (Three) Times a Day With Meals. INJECT 2-8 UNITS THREE TIMES A DAY BEFORE MEALS 15 pen 3   • Insulin Glargine (BASAGLAR KWIKPEN) 100 UNIT/ML injection pen      • Insulin Glargine (LANTUS SOLOSTAR) 100 UNIT/ML injection pen Inject 15 Units under the skin Every Night. 15 pen 1   • irbesartan (AVAPRO) 150 MG tablet Take 1 tablet by mouth Every Night. 30 tablet 5   • levothyroxine (SYNTHROID, LEVOTHROID) 100 MCG tablet TAKE 1 TABLET DAILY 90 tablet 1   • LORazepam (ATIVAN) 1 MG tablet Take 1 tablet by mouth At Night As Needed for Anxiety or Sleep. 30 tablet 2   • metFORMIN (GLUCOPHAGE) 850 MG tablet TAKE 1 TABLET TWICE A DAY  WITH MEALS 180 tablet 1   • Needle, Disp, 31G X 5/16\" misc 1 each 4 (Four) Times a Day. Pen Needle; 400 each 3   • nitrofurantoin (MACRODANTIN) 100 MG capsule 1 capsule twice a day 20 capsule 0   • " "TRUE METRIX BLOOD GLUCOSE TEST test strip USE TO TEST FOUR TIMES DAILY 150 each 11   • TRUEPLUS LANCETS 28G misc TEST FOUR TIMES A  each 11   • traZODone (DESYREL) 50 MG tablet TAKE 1 TO 2 TABLETS NIGHTLYAT BEDTIME 180 tablet 0     No current facility-administered medications for this visit.            Review of Systems:     Constitutional:  Denies recent weight loss, weight gain, fever or chills, no change in exercise tolerance.     HENT:  Denies any hearing loss, epistaxis, hoarseness, or difficulty speaking.     Eyes: Wears eyeglasses or contact lenses.    Respiratory: Baseline shortness of breath     Cardiovascular: Negative for palpations, chest pain, orthopnea, PND, peripheral edema, syncope, or claudication.     Gastrointestinal:  Denies change in bowel habits, dyspepsia, ulcer disease, hematochezia, or melena.     Endocrine: Negative for cold intolerance, heat intolerance, polydipsia, polyphagia and polyuria. Denies any history of weight change, polydipsia, polyuria.     Genitourinary: Negative.      Musculoskeletal: Osteotome    Skin:  Denies any change in hair or nails, rashes, or skin lesions.     Allergic/Immunologic: Negative.  Negative for environmental allergies, food allergies and immunocompromised state.     Neurological:  Denies any history of recurrent headaches, strokes, TIA, or seizure disorder.     Hematological: Denies any food allergies, seasonal allergies, bleeding disorders, or lymphadenopathy.     Psychiatric/Behavioral: Denies any history of depression, substance abuse, or change in cognitive function.       OBJECTIVE:    /84   Pulse 71   Ht 165.1 cm (65\")   Wt 85.3 kg (188 lb)   SpO2 99%   BMI 31.28 kg/m²       Physical Exam:     Constitutional: Cooperative, alert and oriented, well-developed, well-nourished, in no acute distress.     HENT:   Head: Normocephalic, normal hair patterns, no masses or tenderness.  Ears, Nose, and Throat: No gross abnormalities. No pallor or " cyanosis. Dentition good.   Eyes: EOMS intact, PERRL, conjunctivae and lids unremarkable. Fundoscopic exam and visual fields not performed.   Neck: No palpable masses or adenopathy, no thyromegaly, no JVD, carotid pulses are full and equal bilaterally and without  Bruits.     Cardiovascular: Regular rhythm, S1 and S2 normal, no S3 or S4. Apical impulse not displaced. No murmurs, gallops, or rubs detected.     Pulmonary/Chest: Chest: normal symmetry, no tenderness to palpation, normal respiratory excursion, no intercostal retraction, no use of accessory muscles. Pulmonary: Normal breath sounds. No rales or rhonchi.    Abdominal: Abdomen soft, bowel sounds normoactive, no masses, no hepatosplenomegaly, non-tender, no bruits.     Musculoskeletal: No deformities, clubbing, cyanosis, erythema, or edema observed. There are no spinal abnormalities noted. Normal muscle strength and tone. Pulses full and equal in all extremities, no bruits auscultated.     Neurological: No gross motor or sensory deficits noted, affect appropriate, oriented to time, person, place.     Skin: Warm and dry to the touch, no apparent skin lesions or masses noted.     Psychiatric: She has a normal mood and affect. Her behavior is normal. Judgment and thought content normal.         Procedures      Lab Results   Component Value Date    WBC 9.49 07/18/2018    HGB 13.1 07/18/2018    HCT 40.4 07/18/2018    MCV 92.9 07/18/2018     07/18/2018     Lab Results   Component Value Date    GLUCOSE 164 (H) 07/18/2018    BUN 16 07/18/2018    CREATININE 0.72 07/18/2018    EGFRIFNONA 78 07/18/2018    BCR 22.2 07/18/2018    CO2 28.0 07/18/2018    CALCIUM 10.1 07/18/2018    ALBUMIN 4.50 07/18/2018    AST 21 07/18/2018    ALT 17 07/18/2018     Lab Results   Component Value Date    CHOL 183 01/24/2018     Lab Results   Component Value Date    TRIG 92 01/24/2018    TRIG 143 07/20/2016    TRIG 147 07/20/2016     Lab Results   Component Value Date    HDL 75  01/24/2018    HDL 57.4 07/20/2016    HDL 56.5 01/04/2016     No components found for: LDLCALC  Lab Results   Component Value Date    LDL 90 01/24/2018    LDL 90 07/20/2016     07/20/2016     No results found for: HDLLDLRATIO  No components found for: CHOLHDL  Lab Results   Component Value Date    HGBA1C 6.9 (H) 07/18/2018     Lab Results   Component Value Date    TSH 2.500 07/18/2018           ASSESSMENT AND PLAN:    #1 sinus tachycardia #2 long-standing history diabetes #3 hypertension #4 hyperlipidemia #5 mild dyspnea exertion with associated fatigability #6 systolic murmur     82 years old physically active with a background history of hypertension, hyperlipidemia, long-standing history of diabetes, history of secondhand smoking from underwent cardiac evaluation with Holter monitor, echocardiographic study and stress test.  Finding of monitor echo and stress test discussed with the patient.  Patient documented nonsustained supraventricular tachycardia and 6 beat of wide complex tachycardia.  Given that normal stress test abnormal left and a systolic function no further risk stratification needed.  The patient denied any further palpitation.  I will I will start the patient's on beta blocker such as Toprol XL 25 mg daily.  Risk factor lifestyle modification discussed.  Heart shortness of breath multifactorial in etiology given her body habitus and comorbid condition and history of back pain and diabetic neuropathy.    Nyla was seen today for follow-up.    Diagnoses and all orders for this visit:    Diabetic polyneuropathy associated with type 2 diabetes mellitus (CMS/[facility])    Mixed hyperlipidemia    Essential hypertension    Tachycardia    Cardiac murmur    Dyspnea, unspecified type        Eagle Haywood MD  10/29/2018  10:05 AM

## 2018-11-01 ENCOUNTER — OFFICE VISIT (OUTPATIENT)
Dept: FAMILY MEDICINE CLINIC | Facility: CLINIC | Age: 83
End: 2018-11-01

## 2018-11-01 VITALS
DIASTOLIC BLOOD PRESSURE: 70 MMHG | HEIGHT: 65 IN | BODY MASS INDEX: 31.32 KG/M2 | SYSTOLIC BLOOD PRESSURE: 118 MMHG | WEIGHT: 188 LBS

## 2018-11-01 DIAGNOSIS — E11.42 DIABETIC PERIPHERAL NEUROPATHY (HCC): ICD-10-CM

## 2018-11-01 DIAGNOSIS — I47.1 PAROXYSMAL SVT (SUPRAVENTRICULAR TACHYCARDIA) (HCC): ICD-10-CM

## 2018-11-01 DIAGNOSIS — Z79.4 CONTROLLED TYPE 2 DIABETES MELLITUS WITHOUT COMPLICATION, WITH LONG-TERM CURRENT USE OF INSULIN (HCC): Primary | ICD-10-CM

## 2018-11-01 DIAGNOSIS — E11.9 CONTROLLED TYPE 2 DIABETES MELLITUS WITHOUT COMPLICATION, WITH LONG-TERM CURRENT USE OF INSULIN (HCC): Primary | ICD-10-CM

## 2018-11-01 PROCEDURE — 99214 OFFICE O/P EST MOD 30 MIN: CPT | Performed by: FAMILY MEDICINE

## 2018-11-01 RX ORDER — AMLODIPINE BESYLATE 10 MG/1
10 TABLET ORAL DAILY
Qty: 30 TABLET | Refills: 0 | Status: CANCELLED | OUTPATIENT
Start: 2018-11-01

## 2018-11-01 RX ORDER — MONTELUKAST SODIUM 10 MG/1
TABLET ORAL
Refills: 6 | Status: ON HOLD | COMMUNITY
Start: 2018-10-25 | End: 2020-03-07

## 2018-11-01 RX ORDER — LEVOTHYROXINE SODIUM 0.1 MG/1
TABLET ORAL
Qty: 90 TABLET | Refills: 1 | Status: SHIPPED | OUTPATIENT
Start: 2018-11-01 | End: 2019-04-30 | Stop reason: SDUPTHER

## 2018-11-01 NOTE — PROGRESS NOTES
Subjective   Nyla Piña is a 83 y.o. female with hypertension, diabetes, and history of depression after death of her  who presents to the office for a few concerns.  She has diabetic peripheral neuropathy and needs diabetic shoes ordered.  These do help with her walking.   Her blood sugars of been doing better overall and she monitors daily.  Her blood pressure is doing better.  She does not need refills today.  She just had a cardiac workup done.  She was found to have nonsustained SVT and was started on Toprol which she is tolerating well.  She has a 6 month follow-up set.    History of Present Illness   Hypertension   This is a chronic problem. The current episode started more than 1 year ago. The problem has been waxing and waning since onset. Associated symptoms include anxiety, headaches and malaise/fatigue. Pertinent negatives include no blurred vision, chest pain, neck pain, orthopnea, palpitations, peripheral edema, PND, shortness of breath or sweats. There are no associated agents to hypertension. Risk factors for coronary artery disease include dyslipidemia and family history. Past treatments include calcium channel blockers, ARB's.  The current treatment provides moderate improvement. There are no compliance problems.      The following portions of the patient's history were reviewed and updated as appropriate: allergies, current medications, past family history, past medical history, past social history, past surgical history and problem list.    Review of Systems   HENT: Negative.    Respiratory: Positive for cough.    Cardiovascular: Negative.    Gastrointestinal: Negative.    Genitourinary: Negative for flank pain, frequency, hematuria and urgency.   Musculoskeletal: Positive for gait problem.   Skin: Negative.    Allergic/Immunologic: Negative for immunocompromised state.   Neurological: Negative for syncope.   Hematological: Negative.    Psychiatric/Behavioral: Positive for sleep  disturbance. Negative for agitation and dysphoric mood.   All other systems reviewed and are negative.    Objective   Physical Exam   Constitutional: She is oriented to person, place, and time. She appears well-developed and well-nourished.   HENT:   Head: Normocephalic and atraumatic.   Nose: Nose normal.   Mouth/Throat: Oropharynx is clear and moist.   Mild tenderness of the TMJ joint on the right is noted on palpation   Eyes: Pupils are equal, round, and reactive to light. Conjunctivae and EOM are normal.   Neck: Normal range of motion. Neck supple. No JVD present. No tracheal deviation present. No thyromegaly present.   Cardiovascular: Regular rhythm and intact distal pulses.    Murmur heard.  2/6 holosystolic murmur is noted today   Pulmonary/Chest: Effort normal and breath sounds normal. She has no wheezes.   Abdominal: Soft. Bowel sounds are normal. She exhibits no distension. There is no tenderness.   Musculoskeletal: She exhibits tenderness. She exhibits no edema.   Lymphadenopathy:     She has no cervical adenopathy.   Neurological: She is alert and oriented to person, place, and time. Coordination normal.   Skin: Skin is warm and dry. No rash noted.       Psychiatric: She has a normal mood and affect.   Nursing note and vitals reviewed.    Assessment/Plan   Nyla was seen today for diabetes.    Diagnoses and all orders for this visit:    Controlled type 2 diabetes mellitus without complication, with long-term current use of insulin (CMS/HCC)    Diabetic peripheral neuropathy (CMS/HCC)    Paroxysmal SVT (supraventricular tachycardia) (CMS/[facility])    Diabetic shoes are ordered.  Continue to test blood sugars daily and when needed and recheck in 3 months.    Follow up with cardiology as above, continue metoprolol           This document has been electronically signed by Chantel Long MD on November 1, 2018 10:36 AM

## 2018-11-09 RX ORDER — AMLODIPINE BESYLATE 10 MG/1
10 TABLET ORAL DAILY
Qty: 30 TABLET | Refills: 4 | Status: SHIPPED | OUTPATIENT
Start: 2018-11-09 | End: 2019-04-05 | Stop reason: SDUPTHER

## 2018-11-19 DIAGNOSIS — M54.9 BACK PAIN, UNSPECIFIED BACK LOCATION, UNSPECIFIED BACK PAIN LATERALITY, UNSPECIFIED CHRONICITY: Primary | ICD-10-CM

## 2018-11-20 RX ORDER — DULOXETIN HYDROCHLORIDE 60 MG/1
CAPSULE, DELAYED RELEASE ORAL
Qty: 90 CAPSULE | Refills: 1 | Status: SHIPPED | OUTPATIENT
Start: 2018-11-20 | End: 2019-05-27 | Stop reason: SDUPTHER

## 2018-11-21 RX ORDER — GABAPENTIN 300 MG/1
CAPSULE ORAL
Qty: 90 CAPSULE | Refills: 1 | Status: SHIPPED | OUTPATIENT
Start: 2018-11-21 | End: 2018-12-21 | Stop reason: SDUPTHER

## 2018-11-29 DIAGNOSIS — M54.9 UPPER BACK PAIN: Primary | ICD-10-CM

## 2018-11-30 ENCOUNTER — OFFICE VISIT (OUTPATIENT)
Dept: ORTHOPEDIC SURGERY | Facility: CLINIC | Age: 83
End: 2018-11-30

## 2018-11-30 VITALS — WEIGHT: 191 LBS | BODY MASS INDEX: 31.82 KG/M2 | HEIGHT: 65 IN

## 2018-11-30 DIAGNOSIS — M54.6 CHRONIC MIDLINE THORACIC BACK PAIN: Primary | ICD-10-CM

## 2018-11-30 DIAGNOSIS — G89.29 CHRONIC MIDLINE THORACIC BACK PAIN: Primary | ICD-10-CM

## 2018-11-30 PROCEDURE — 99214 OFFICE O/P EST MOD 30 MIN: CPT | Performed by: ORTHOPAEDIC SURGERY

## 2018-11-30 NOTE — PROGRESS NOTES
Nyla Piña is a 83 y.o. female   Primary provider:  Chantel Long MD       Chief Complaint   Patient presents with   • Thoracic Spine - Back Pain       HISTORY OF PRESENT ILLNESS: patient has pain in middle part of back, xrays done today. Patient states that pain increases with prolonged standing. Using cane for assistance. Pain 8/10 today.     Back Pain   This is a chronic problem. The current episode started more than 1 year ago. The problem occurs intermittently. The problem is unchanged. The pain is present in the thoracic spine. The quality of the pain is described as aching. The pain is severe. The pain is the same all the time. The symptoms are aggravated by standing. Risk factors include obesity. She has tried bed rest and NSAIDs for the symptoms.   long standing back pain, the pain is worse with standing.  The pain does not travel.    Numbness of feet, legs and hands   no fevers, no chills      CONCURRENT MEDICAL HISTORY:    Past Medical History:   Diagnosis Date   • Acute bronchitis    • Acute sinusitis    • Acute upper respiratory infection    • Anxiety    • Cellulitis of lower leg    • Chronic urinary tract infection    • Cough    • Death of     • Diabetic asymmetric polyneuropathy (CMS/HCC)    • Diabetic peripheral neuropathy (CMS/HCC)    • Diabetic polyneuropathy (CMS/HCC)    • Disease of nail    • Dorsalgia    • Flank pain    • Hashimoto's thyroiditis    • History of echocardiogram 02/19/2013   • Hypertensive disorder    • Insomnia    • Mixed hyperlipidemia    • Non-healing surgical wound    • Osteoarthritis of multiple joints    • Peripheral vascular disease (CMS/HCC)    • Seasonal allergic rhinitis    • Type II diabetes mellitus, uncontrolled (CMS/HCC)    • Upper respiratory infection    • Urinary tract infectious disease    • Urinary tract infectious disease    • Vitamin D deficiency        Allergies   Allergen Reactions   • Latex Itching   • Penicillins Swelling and Rash          Current Outpatient Medications:   •  albuterol (VENTOLIN HFA) 108 (90 Base) MCG/ACT inhaler, Inhale 2 puffs Every 6 (Six) Hours As Needed for Wheezing or Shortness of Air., Disp: 1 inhaler, Rfl: 3  •  amLODIPine (NORVASC) 10 MG tablet, Take 1 tablet by mouth Daily., Disp: 30 tablet, Rfl: 4  •  aspirin 81 MG tablet, Take 81 mg by mouth daily., Disp: , Rfl:   •  B-D ULTRAFINE III SHORT PEN 31G X 8 MM misc, Use and discard 1 pen needle 4 times daily., Disp: 200 each, Rfl: 5  •  Calcium Carbonate (CALCIUM 600 PO), Take 2 tablets by mouth daily., Disp: , Rfl:   •  diclofenac (VOLTAREN) 1 % gel gel, Apply 4 g topically 4 (Four) Times a Day., Disp: 100 g, Rfl: 11  •  DULoxetine (CYMBALTA) 60 MG capsule, TAKE 1 CAPSULE DAILY, Disp: 90 capsule, Rfl: 1  •  ergocalciferol (ERGOCALCIFEROL) 84101 units capsule, Take 1 capsule by mouth 1 (One) Time Per Week., Disp: 12 capsule, Rfl: 11  •  famciclovir (FAMVIR) 250 MG tablet, Take 1 tablet by mouth 3 (Three) Times a Day., Disp: 15 tablet, Rfl: 0  •  fluticasone (FLONASE) 50 MCG/ACT nasal spray, SPRAY 1 SPRAY INTO EACH NOSTRIL BIDM, Disp: 16 g, Rfl: 5  •  gabapentin (NEURONTIN) 300 MG capsule, TAKE ONE CAPSULE BY MOUTH THREE TIMES A DAY, Disp: 90 capsule, Rfl: 1  •  HYDROcodone-acetaminophen (NORCO) 7.5-325 MG per tablet, Take 1 tablet by mouth 2 (Two) Times a Day., Disp: 60 tablet, Rfl: 0  •  insulin aspart (novoLOG FLEXPEN) 100 UNIT/ML solution pen-injector sc pen, Inject 2-8 Units under the skin 3 (Three) Times a Day With Meals. INJECT 2-8 UNITS THREE TIMES A DAY BEFORE MEALS, Disp: 15 pen, Rfl: 3  •  Insulin Glargine (BASAGLAR KWIKPEN) 100 UNIT/ML injection pen, , Disp: , Rfl:   •  Insulin Glargine (LANTUS SOLOSTAR) 100 UNIT/ML injection pen, Inject 15 Units under the skin Every Night., Disp: 15 pen, Rfl: 1  •  irbesartan (AVAPRO) 150 MG tablet, Take 1 tablet by mouth Every Night., Disp: 30 tablet, Rfl: 5  •  levothyroxine (SYNTHROID, LEVOTHROID) 100 MCG tablet, TAKE 1  "TABLET DAILY, Disp: 90 tablet, Rfl: 1  •  LORazepam (ATIVAN) 1 MG tablet, Take 1 tablet by mouth At Night As Needed for Anxiety or Sleep., Disp: 30 tablet, Rfl: 2  •  metFORMIN (GLUCOPHAGE) 850 MG tablet, TAKE 1 TABLET TWICE A DAY  WITH MEALS, Disp: 180 tablet, Rfl: 1  •  metoprolol succinate XL (TOPROL-XL) 25 MG 24 hr tablet, Take 1 tablet by mouth Daily., Disp: 90 tablet, Rfl: 3  •  montelukast (SINGULAIR) 10 MG tablet, , Disp: , Rfl: 6  •  Needle, Disp, 31G X 5/16\" misc, 1 each 4 (Four) Times a Day. Pen Needle;, Disp: 400 each, Rfl: 3  •  nitrofurantoin (MACRODANTIN) 100 MG capsule, 1 capsule twice a day, Disp: 20 capsule, Rfl: 0  •  TRUE METRIX BLOOD GLUCOSE TEST test strip, USE TO TEST FOUR TIMES DAILY, Disp: 150 each, Rfl: 11  •  TRUEPLUS LANCETS 28G misc, TEST FOUR TIMES A DAY, Disp: 150 each, Rfl: 11    Past Surgical History:   Procedure Laterality Date   • EXCISION MASS LEG Left 11/05/2013    Excision of soft tissue mass of left leg   • HYSTERECTOMY     • STEROID INJECTION  02/08/2016    Depo Medrol (Methylprednisone) 80mg (Acute upper respiratory infection, unspecified)        No family history on file.    Social History     Socioeconomic History   • Marital status:      Spouse name: Not on file   • Number of children: Not on file   • Years of education: Not on file   • Highest education level: Not on file   Social Needs   • Financial resource strain: Not on file   • Food insecurity - worry: Not on file   • Food insecurity - inability: Not on file   • Transportation needs - medical: Not on file   • Transportation needs - non-medical: Not on file   Occupational History   • Not on file   Tobacco Use   • Smoking status: Never Smoker   • Smokeless tobacco: Never Used   Substance and Sexual Activity   • Alcohol use: No   • Drug use: No   • Sexual activity: Defer   Other Topics Concern   • Not on file   Social History Narrative   • Not on file        Review of Systems   Constitutional: Negative.    HENT: " "Positive for hearing loss.         Ringing in ears.    Eyes: Negative.    Respiratory: Negative.    Cardiovascular: Negative.    Gastrointestinal: Negative.    Endocrine: Negative.    Genitourinary: Negative.    Musculoskeletal: Positive for back pain and joint swelling.   Skin: Negative.    Allergic/Immunologic: Negative.    Neurological: Negative.    Hematological: Negative.    Psychiatric/Behavioral: Negative.    All other systems reviewed and are negative.      PHYSICAL EXAMINATION:       Ht 165.1 cm (65\")   Wt 86.6 kg (191 lb)   BMI 31.78 kg/m²     Physical Exam   Constitutional: She is oriented to person, place, and time. She appears well-developed and well-nourished. No distress.   elderly   HENT:   Head: Normocephalic.   Mouth/Throat: No oropharyngeal exudate.   Eyes: Pupils are equal, round, and reactive to light.   Neck: No JVD present. No tracheal deviation present. No thyromegaly present.   Cardiovascular: Normal rate and intact distal pulses.   Pulmonary/Chest: Effort normal. No stridor. No respiratory distress. She has no wheezes.   Abdominal: Soft. She exhibits no distension. There is no tenderness. There is no guarding.   Neurological: She is alert and oriented to person, place, and time. She displays normal reflexes. No cranial nerve deficit. Coordination normal.   Skin: Skin is warm and dry. Capillary refill takes less than 2 seconds. No erythema.   Psychiatric: She has a normal mood and affect. Her behavior is normal.       GAIT:     []  Normal  []  Antalgic    Assistive device: []  None  []  Walker     []  Crutches  [x]  Cane     []  Wheelchair  []  Stretcher    Ortho Exam      Tenderness: Thoracic   Swelling:  Mild       Range of Motion:      Flexion: 30     Extension: 15     Lateral Bend:   Right 5                              Left 5     Rotation:          Right 10                              Left 10       SLR:                  Right Negative                             Left Negative     "   Muscle Strength      Abductor: 5/5     Adductor: 5/5     Quadriceps: 5/5     Hamstrings: 5/5       Reflexes     Patellar: 1/4    Achilles: Not done    Babinski: Not done    Biceps: Not done    Triceps: Not done       Sensation: Normal   Gait: Normal   Toe Walk: exam deferred   Heel Walk: exam deferred       Xr Spine Thoracic 2 View    Result Date: 11/30/2018  Narrative: Ordering Provider:  Kenn Raya MD Ordering Diagnosis/Indication:  Upper back pain Procedure:  XR SPINE THORACIC 2 VW Exam Date:  11/30/18 RELEVANT PRIOR IMAGES:  XR spine thoracic 2 vw 03/17/2016 [id number] Final COMPARISON:  Not applicable, no relevant images available.     Impression:  poor bone quality, increased thoracic kyphosis, eggsaterated thoracic kyphosis., thoracic disc spaces are normal with good disc height Bone quality: poor Alignment: increased thoracic kyphosis Fracture: none Soft tissue: aorta calcification.           ASSESSMENT:    Diagnoses and all orders for this visit:    Chronic midline thoracic back pain          PLAN    TLSO brace is ordered for her.        Kenn Raya MD

## 2018-12-20 ENCOUNTER — OFFICE VISIT (OUTPATIENT)
Dept: FAMILY MEDICINE CLINIC | Facility: CLINIC | Age: 83
End: 2018-12-20

## 2018-12-20 ENCOUNTER — TELEPHONE (OUTPATIENT)
Dept: ORTHOPEDIC SURGERY | Facility: CLINIC | Age: 83
End: 2018-12-20

## 2018-12-20 VITALS
HEART RATE: 89 BPM | TEMPERATURE: 98.2 F | BODY MASS INDEX: 31.78 KG/M2 | DIASTOLIC BLOOD PRESSURE: 60 MMHG | SYSTOLIC BLOOD PRESSURE: 110 MMHG | WEIGHT: 191 LBS

## 2018-12-20 DIAGNOSIS — G89.29 CHRONIC MIDLINE THORACIC BACK PAIN: ICD-10-CM

## 2018-12-20 DIAGNOSIS — M54.6 CHRONIC MIDLINE THORACIC BACK PAIN: ICD-10-CM

## 2018-12-20 DIAGNOSIS — M15.9 OSTEOARTHRITIS OF MULTIPLE JOINTS, UNSPECIFIED OSTEOARTHRITIS TYPE: Primary | ICD-10-CM

## 2018-12-20 PROCEDURE — 99214 OFFICE O/P EST MOD 30 MIN: CPT | Performed by: FAMILY MEDICINE

## 2018-12-20 RX ORDER — HYDROCODONE BITARTRATE AND ACETAMINOPHEN 7.5; 325 MG/1; MG/1
1 TABLET ORAL
Qty: 60 TABLET | Refills: 0 | Status: SHIPPED | OUTPATIENT
Start: 2018-12-20 | End: 2019-03-28 | Stop reason: SDUPTHER

## 2018-12-20 NOTE — TELEPHONE ENCOUNTER
[facility] CALLED PER THE PT REQUEST TO CHECK AN SEE WHEN SHE WILL BE GETTING HER BACK BRACE SHE SAYS ITS BEEN OVER A MONTH NOW...

## 2018-12-20 NOTE — TELEPHONE ENCOUNTER
The order is in the computer, if you can't find it, check with [employer], reprint it if necessary.  Was it sent to the company?  If not, perhaps it needs to be sent, or sent again.  If it was sent already, check with the company find out what the delay is.

## 2018-12-21 RX ORDER — LORAZEPAM 1 MG/1
1 TABLET ORAL NIGHTLY PRN
Qty: 30 TABLET | Refills: 2 | Status: SHIPPED | OUTPATIENT
Start: 2018-12-21 | End: 2019-03-28 | Stop reason: SDUPTHER

## 2018-12-21 RX ORDER — GABAPENTIN 300 MG/1
CAPSULE ORAL
Qty: 90 CAPSULE | Refills: 2 | Status: SHIPPED | OUTPATIENT
Start: 2018-12-21 | End: 2019-03-28 | Stop reason: SDUPTHER

## 2019-01-16 RX ORDER — MONTELUKAST SODIUM 10 MG/1
10 TABLET ORAL NIGHTLY
Qty: 30 TABLET | Refills: 6 | Status: SHIPPED | OUTPATIENT
Start: 2019-01-16 | End: 2019-02-20

## 2019-01-25 RX ORDER — IRBESARTAN 150 MG/1
150 TABLET ORAL NIGHTLY
Qty: 30 TABLET | Refills: 5 | Status: SHIPPED | OUTPATIENT
Start: 2019-01-25 | End: 2019-06-24 | Stop reason: SDUPTHER

## 2019-02-04 RX ORDER — INSULIN ASPART 100 [IU]/ML
INJECTION, SOLUTION INTRAVENOUS; SUBCUTANEOUS
Qty: 30 ML | Refills: 2 | OUTPATIENT
Start: 2019-02-04

## 2019-02-05 ENCOUNTER — LAB (OUTPATIENT)
Dept: LAB | Facility: OTHER | Age: 84
End: 2019-02-05

## 2019-02-05 DIAGNOSIS — E86.0 DEHYDRATION: ICD-10-CM

## 2019-02-05 LAB
25(OH)D3 SERPL-MCNC: 45.5 NG/ML (ref 30–100)
ALBUMIN SERPL-MCNC: 4.3 G/DL (ref 3.5–5)
ALBUMIN/GLOB SERPL: 1.5 G/DL (ref 1.1–1.8)
ALP SERPL-CCNC: 69 U/L (ref 38–126)
ALT SERPL W P-5'-P-CCNC: 16 U/L
ANION GAP SERPL CALCULATED.3IONS-SCNC: 11 MMOL/L (ref 5–15)
AST SERPL-CCNC: 18 U/L (ref 14–36)
BASOPHILS # BLD AUTO: 0.03 10*3/MM3 (ref 0–0.2)
BASOPHILS NFR BLD AUTO: 0.3 % (ref 0–2)
BILIRUB SERPL-MCNC: 1 MG/DL (ref 0.2–1.3)
BUN BLD-MCNC: 15 MG/DL (ref 7–17)
BUN/CREAT SERPL: 20.8 (ref 7–25)
CALCIUM SPEC-SCNC: 9.2 MG/DL (ref 8.4–10.2)
CHLORIDE SERPL-SCNC: 100 MMOL/L (ref 98–107)
CHOLEST SERPL-MCNC: 188 MG/DL (ref 150–200)
CO2 SERPL-SCNC: 27 MMOL/L (ref 22–30)
CREAT BLD-MCNC: 0.72 MG/DL (ref 0.52–1.04)
DEPRECATED RDW RBC AUTO: 45.8 FL (ref 36.4–46.3)
EOSINOPHIL # BLD AUTO: 0.44 10*3/MM3 (ref 0–0.7)
EOSINOPHIL NFR BLD AUTO: 4.9 % (ref 0–7)
ERYTHROCYTE [DISTWIDTH] IN BLOOD BY AUTOMATED COUNT: 14.2 % (ref 11.5–14.5)
GFR SERPL CREATININE-BSD FRML MDRD: 78 ML/MIN/1.73 (ref 39–90)
GLOBULIN UR ELPH-MCNC: 2.9 GM/DL (ref 2.3–3.5)
GLUCOSE BLD-MCNC: 132 MG/DL (ref 74–99)
HBA1C MFR BLD: 7.2 % (ref 4–5.6)
HCT VFR BLD AUTO: 37 % (ref 35–45)
HDLC SERPL-MCNC: 55 MG/DL (ref 40–59)
HGB BLD-MCNC: 11.7 G/DL (ref 12–15.5)
LDLC SERPL CALC-MCNC: 103 MG/DL
LDLC/HDLC SERPL: 1.87 {RATIO} (ref 0–3.22)
LYMPHOCYTES # BLD AUTO: 3.31 10*3/MM3 (ref 0.6–4.2)
LYMPHOCYTES NFR BLD AUTO: 36.9 % (ref 10–50)
MCH RBC QN AUTO: 29 PG (ref 26.5–34)
MCHC RBC AUTO-ENTMCNC: 31.6 G/DL (ref 31.4–36)
MCV RBC AUTO: 91.6 FL (ref 80–98)
MONOCYTES # BLD AUTO: 0.85 10*3/MM3 (ref 0–0.9)
MONOCYTES NFR BLD AUTO: 9.5 % (ref 0–12)
NEUTROPHILS # BLD AUTO: 4.34 10*3/MM3 (ref 2–8.6)
NEUTROPHILS NFR BLD AUTO: 48.4 % (ref 37–80)
PLATELET # BLD AUTO: 344 10*3/MM3 (ref 150–450)
PMV BLD AUTO: 9.1 FL (ref 8–12)
POTASSIUM BLD-SCNC: 4.2 MMOL/L (ref 3.4–5)
PROT SERPL-MCNC: 7.2 G/DL (ref 6.3–8.2)
RBC # BLD AUTO: 4.04 10*6/MM3 (ref 3.77–5.16)
SODIUM BLD-SCNC: 138 MMOL/L (ref 137–145)
TRIGL SERPL-MCNC: 151 MG/DL
VIT B12 BLD-MCNC: 247 PG/ML (ref 239–931)
VLDLC SERPL-MCNC: 30.2 MG/DL
WBC NRBC COR # BLD: 8.97 10*3/MM3 (ref 3.2–9.8)

## 2019-02-05 PROCEDURE — 85025 COMPLETE CBC W/AUTO DIFF WBC: CPT | Performed by: NURSE PRACTITIONER

## 2019-02-05 PROCEDURE — 84443 ASSAY THYROID STIM HORMONE: CPT | Performed by: NURSE PRACTITIONER

## 2019-02-05 PROCEDURE — 36415 COLL VENOUS BLD VENIPUNCTURE: CPT | Performed by: NURSE PRACTITIONER

## 2019-02-05 PROCEDURE — 82607 VITAMIN B-12: CPT | Performed by: NURSE PRACTITIONER

## 2019-02-05 PROCEDURE — 83036 HEMOGLOBIN GLYCOSYLATED A1C: CPT | Performed by: NURSE PRACTITIONER

## 2019-02-05 PROCEDURE — 36415 COLL VENOUS BLD VENIPUNCTURE: CPT | Performed by: FAMILY MEDICINE

## 2019-02-05 PROCEDURE — 80053 COMPREHEN METABOLIC PANEL: CPT | Performed by: FAMILY MEDICINE

## 2019-02-05 PROCEDURE — 82306 VITAMIN D 25 HYDROXY: CPT | Performed by: NURSE PRACTITIONER

## 2019-02-05 PROCEDURE — 80061 LIPID PANEL: CPT | Performed by: NURSE PRACTITIONER

## 2019-02-06 LAB — TSH SERPL DL<=0.05 MIU/L-ACNC: 1.46 MIU/ML (ref 0.46–4.68)

## 2019-02-08 ENCOUNTER — OFFICE VISIT (OUTPATIENT)
Dept: ENDOCRINOLOGY | Facility: CLINIC | Age: 84
End: 2019-02-08

## 2019-02-08 VITALS
HEIGHT: 65 IN | WEIGHT: 186.2 LBS | HEART RATE: 93 BPM | BODY MASS INDEX: 31.02 KG/M2 | OXYGEN SATURATION: 98 % | DIASTOLIC BLOOD PRESSURE: 70 MMHG | SYSTOLIC BLOOD PRESSURE: 112 MMHG

## 2019-02-08 DIAGNOSIS — E11.9 CONTROLLED TYPE 2 DIABETES MELLITUS WITHOUT COMPLICATION, WITH LONG-TERM CURRENT USE OF INSULIN (HCC): Primary | ICD-10-CM

## 2019-02-08 DIAGNOSIS — Z79.4 CONTROLLED TYPE 2 DIABETES MELLITUS WITHOUT COMPLICATION, WITH LONG-TERM CURRENT USE OF INSULIN (HCC): Primary | ICD-10-CM

## 2019-02-08 DIAGNOSIS — E11.42 DIABETIC POLYNEUROPATHY ASSOCIATED WITH TYPE 2 DIABETES MELLITUS (HCC): ICD-10-CM

## 2019-02-08 DIAGNOSIS — E06.3 HASHIMOTO'S THYROIDITIS: ICD-10-CM

## 2019-02-08 DIAGNOSIS — E55.9 VITAMIN D DEFICIENCY: ICD-10-CM

## 2019-02-08 PROCEDURE — 99214 OFFICE O/P EST MOD 30 MIN: CPT | Performed by: NURSE PRACTITIONER

## 2019-02-08 NOTE — PROGRESS NOTES
Subjective    Nyla Piña is a 83 y.o. female. she is here today for follow-up.    History of Present Illness       Duration/Timing:Diabetes mellitus type 2, Age at onset of diabetes : 58 years, Onset of symptoms gradual         Timing constant      quality near control      Severity Complications        Severity (Complications/Hospitalizations)  Secondary Macrovascular Complications: No CAD, No CVA, No PAD  Secondary Microvascular Complications: No Diabetic Nephropathy, Microalbuminuria, No proteinuria, No Diabetic Retinopathy, Diabetic Neuropathy     Context  Diabetes Regimen: Insulin, oral medication,      Lab Results   Component Value Date    HGBA1C 7.2 (H) 02/05/2019                         Blood Glucose Readings      Several at goal      Lowest 85      Diet      Low carb      Exercise:Does not exercise     Associated Signs/Symptoms  Hyperglycemic Symptoms:Polyruria, polydipsia, polyphagia, No blurred vision, No weight gain  Hypoglycemic Episodes:No documented hypoglycemia                  The following portions of the patient's history were reviewed and updated as appropriate:   Past Medical History:   Diagnosis Date   • Acute bronchitis    • Acute sinusitis    • Acute upper respiratory infection    • Anxiety    • Cellulitis of lower leg    • Chronic urinary tract infection    • Cough    • Death of     • Diabetic asymmetric polyneuropathy (CMS/HCC)    • Diabetic peripheral neuropathy (CMS/HCC)    • Diabetic polyneuropathy (CMS/HCC)    • Disease of nail    • Dorsalgia    • Flank pain    • Hashimoto's thyroiditis    • History of echocardiogram 02/19/2013   • Hypertensive disorder    • Insomnia    • Mixed hyperlipidemia    • Non-healing surgical wound    • Osteoarthritis of multiple joints    • Peripheral vascular disease (CMS/HCC)    • Seasonal allergic rhinitis    • Type II diabetes mellitus, uncontrolled (CMS/HCC)    • Upper respiratory infection    • Urinary tract infectious disease    •  Urinary tract infectious disease    • Vitamin D deficiency      Past Surgical History:   Procedure Laterality Date   • EXCISION MASS LEG Left 2013    Excision of soft tissue mass of left leg   • HYSTERECTOMY      Age 27   • STEROID INJECTION  2016    Depo Medrol (Methylprednisone) 80mg (Acute upper respiratory infection, unspecified)      No family history on file.  OB History      Para Term  AB Living    2 2 2          SAB TAB Ectopic Molar Multiple Live Births                       Current Outpatient Medications   Medication Sig Dispense Refill   • albuterol (VENTOLIN HFA) 108 (90 Base) MCG/ACT inhaler Inhale 2 puffs Every 6 (Six) Hours As Needed for Wheezing or Shortness of Air. 1 inhaler 3   • amLODIPine (NORVASC) 10 MG tablet Take 1 tablet by mouth Daily. 30 tablet 4   • aspirin 81 MG tablet Take 81 mg by mouth daily.     • B-D ULTRAFINE III SHORT PEN 31G X 8 MM misc Use and discard 1 pen needle 4 times daily. 200 each 5   • Calcium Carbonate (CALCIUM 600 PO) Take 2 tablets by mouth daily.     • diclofenac (VOLTAREN) 1 % gel gel Apply 4 g topically 4 (Four) Times a Day. 100 g 11   • DULoxetine (CYMBALTA) 60 MG capsule TAKE 1 CAPSULE DAILY 90 capsule 1   • ergocalciferol (ERGOCALCIFEROL) 38737 units capsule Take 1 capsule by mouth 1 (One) Time Per Week. 12 capsule 11   • famciclovir (FAMVIR) 250 MG tablet Take 1 tablet by mouth 3 (Three) Times a Day. 15 tablet 0   • fluticasone (FLONASE) 50 MCG/ACT nasal spray SPRAY 1 SPRAY INTO EACH NOSTRIL BIDM 16 g 5   • gabapentin (NEURONTIN) 300 MG capsule TAKE ONE CAPSULE BY MOUTH THREE TIMES A DAY 90 capsule 2   • HYDROcodone-acetaminophen (NORCO) 7.5-325 MG per tablet Take 1 tablet by mouth 2 (Two) Times a Day. 60 tablet 0   • insulin aspart (novoLOG FLEXPEN) 100 UNIT/ML solution pen-injector sc pen Take 15 units TID before each meal 15 pen 3   • Insulin Glargine (BASAGLAR KWIKPEN) 100 UNIT/ML injection pen      • Insulin Glargine (LANTUS  "SOLOSTAR) 100 UNIT/ML injection pen Inject 15 Units under the skin Every Night. 15 pen 1   • irbesartan (AVAPRO) 150 MG tablet TAKE 1 TABLET BY MOUTH EVERY NIGHT. 30 tablet 5   • levothyroxine (SYNTHROID, LEVOTHROID) 100 MCG tablet TAKE 1 TABLET DAILY 90 tablet 1   • LORazepam (ATIVAN) 1 MG tablet Take 1 tablet by mouth At Night As Needed for Anxiety or Sleep. 30 tablet 2   • metFORMIN (GLUCOPHAGE) 850 MG tablet TAKE 1 TABLET TWICE A DAY  WITH MEALS 180 tablet 1   • metoprolol succinate XL (TOPROL-XL) 25 MG 24 hr tablet Take 1 tablet by mouth Daily. 90 tablet 3   • montelukast (SINGULAIR) 10 MG tablet   6   • montelukast (SINGULAIR) 10 MG tablet TAKE 1 TABLET BY MOUTH EVERY NIGHT  DAYS. 30 tablet 6   • Needle, Disp, 31G X 5/16\" misc 1 each 4 (Four) Times a Day. Pen Needle; 400 each 3   • nitrofurantoin (MACRODANTIN) 100 MG capsule 1 capsule twice a day 20 capsule 0   • TRUE METRIX BLOOD GLUCOSE TEST test strip USE TO TEST FOUR TIMES DAILY 150 each 11   • TRUEPLUS LANCETS 28G misc TEST FOUR TIMES A  each 11     No current facility-administered medications for this visit.      Allergies   Allergen Reactions   • Latex Itching   • Penicillins Swelling and Rash     Social History     Socioeconomic History   • Marital status:      Spouse name: Not on file   • Number of children: Not on file   • Years of education: Not on file   • Highest education level: Not on file   Tobacco Use   • Smoking status: Never Smoker   • Smokeless tobacco: Never Used   Substance and Sexual Activity   • Alcohol use: No   • Drug use: No   • Sexual activity: Defer       Review of Systems  Review of Systems   Constitutional: Negative for activity change, appetite change, diaphoresis and fatigue.   HENT: Negative for facial swelling, sneezing, sore throat, tinnitus, trouble swallowing and voice change.    Eyes: Negative for photophobia, pain, discharge, redness, itching and visual disturbance.   Respiratory: Negative for apnea, " "cough, choking, chest tightness and shortness of breath.    Cardiovascular: Negative for chest pain, palpitations and leg swelling.   Gastrointestinal: Negative for abdominal distention, abdominal pain, constipation, diarrhea, nausea and vomiting.   Endocrine: Negative for cold intolerance, heat intolerance, polydipsia, polyphagia and polyuria.   Genitourinary: Negative for difficulty urinating, dysuria, frequency, hematuria and urgency.   Musculoskeletal: Negative for arthralgias, back pain, gait problem, joint swelling, myalgias, neck pain and neck stiffness.   Skin: Negative for color change, pallor, rash and wound.   Neurological: Negative for dizziness, tremors, weakness, light-headedness, numbness and headaches.   Hematological: Negative for adenopathy. Does not bruise/bleed easily.   Psychiatric/Behavioral: Negative for behavioral problems, confusion and sleep disturbance.        Objective    /70   Pulse 93   Ht 165.1 cm (65\")   Wt 84.5 kg (186 lb 3.2 oz)   SpO2 98%   BMI 30.99 kg/m²   Physical Exam   Constitutional: She is oriented to person, place, and time. She appears well-developed and well-nourished. No distress.   HENT:   Head: Normocephalic and atraumatic.   Right Ear: External ear normal.   Left Ear: External ear normal.   Nose: Nose normal.   Eyes: Conjunctivae and EOM are normal. Pupils are equal, round, and reactive to light.   Neck: Normal range of motion. Neck supple. No tracheal deviation present. No thyromegaly present.   Cardiovascular: Normal rate, regular rhythm and normal heart sounds.   No murmur heard.  Pulmonary/Chest: Effort normal and breath sounds normal. No respiratory distress. She has no wheezes.   Abdominal: Soft. Bowel sounds are normal. There is no tenderness. There is no rebound and no guarding.   Musculoskeletal: Normal range of motion. She exhibits no edema, tenderness or deformity.   Neurological: She is alert and oriented to person, place, and time. No cranial " nerve deficit.   Skin: Skin is warm and dry. No rash noted.   Psychiatric: She has a normal mood and affect. Her behavior is normal. Judgment and thought content normal.       Lab Review  Glucose (mg/dL)   Date Value   02/05/2019 132 (H)   07/18/2018 164 (H)   05/30/2018 161 (H)     Sodium (mmol/L)   Date Value   02/05/2019 138   07/18/2018 136 (L)   05/30/2018 134     Potassium (mmol/L)   Date Value   02/05/2019 4.2   07/18/2018 4.4   05/30/2018 4.9     Chloride (mmol/L)   Date Value   02/05/2019 100   07/18/2018 99   05/30/2018 96 (L)     CO2 (mmol/L)   Date Value   02/05/2019 27.0   07/18/2018 28.0   05/30/2018 25.0     BUN (mg/dL)   Date Value   02/05/2019 15   07/18/2018 16   05/30/2018 22     Creatinine (mg/dL)   Date Value   02/05/2019 0.72   07/18/2018 0.72   05/30/2018 1.40 (H)     Hemoglobin A1C (%)   Date Value   02/05/2019 7.2 (H)   07/18/2018 6.9 (H)   01/24/2018 7.1 (H)     Triglycerides   Date Value   02/05/2019 151 mg/dL (H)   01/24/2018 92 mg/dL   07/20/2016 143 mg/dl   07/20/2016 147 mg/dl   01/04/2016 105 mg/dl     LDL Cholesterol    Date Value   02/05/2019 103 mg/dL (H)   01/24/2018 90 mg/dL   07/20/2016 90 mg/dl   01/04/2016 77 mg/dl   09/17/2015 70 mg/dl     LDLCALC ELECT (mg/dl)   Date Value   07/20/2016 103       Assessment/Plan      1. Controlled type 2 diabetes mellitus without complication, with long-term current use of insulin (CMS/[facility])    2. Hashimoto's thyroiditis    3. Diabetic polyneuropathy associated with type 2 diabetes mellitus (CMS/[facility])    4. Vitamin D deficiency    .    Medications prescribed:  Outpatient Encounter Medications as of 2/8/2019   Medication Sig Dispense Refill   • albuterol (VENTOLIN HFA) 108 (90 Base) MCG/ACT inhaler Inhale 2 puffs Every 6 (Six) Hours As Needed for Wheezing or Shortness of Air. 1 inhaler 3   • amLODIPine (NORVASC) 10 MG tablet Take 1 tablet by mouth Daily. 30 tablet 4   • aspirin 81 MG tablet Take 81 mg by mouth daily.     • B-D ULTRAFINE III  "SHORT PEN 31G X 8 MM misc Use and discard 1 pen needle 4 times daily. 200 each 5   • Calcium Carbonate (CALCIUM 600 PO) Take 2 tablets by mouth daily.     • diclofenac (VOLTAREN) 1 % gel gel Apply 4 g topically 4 (Four) Times a Day. 100 g 11   • DULoxetine (CYMBALTA) 60 MG capsule TAKE 1 CAPSULE DAILY 90 capsule 1   • ergocalciferol (ERGOCALCIFEROL) 66573 units capsule Take 1 capsule by mouth 1 (One) Time Per Week. 12 capsule 11   • famciclovir (FAMVIR) 250 MG tablet Take 1 tablet by mouth 3 (Three) Times a Day. 15 tablet 0   • fluticasone (FLONASE) 50 MCG/ACT nasal spray SPRAY 1 SPRAY INTO EACH NOSTRIL BIDM 16 g 5   • gabapentin (NEURONTIN) 300 MG capsule TAKE ONE CAPSULE BY MOUTH THREE TIMES A DAY 90 capsule 2   • HYDROcodone-acetaminophen (NORCO) 7.5-325 MG per tablet Take 1 tablet by mouth 2 (Two) Times a Day. 60 tablet 0   • insulin aspart (novoLOG FLEXPEN) 100 UNIT/ML solution pen-injector sc pen Take 15 units TID before each meal 15 pen 3   • Insulin Glargine (BASAGLAR KWIKPEN) 100 UNIT/ML injection pen      • Insulin Glargine (LANTUS SOLOSTAR) 100 UNIT/ML injection pen Inject 15 Units under the skin Every Night. 15 pen 1   • irbesartan (AVAPRO) 150 MG tablet TAKE 1 TABLET BY MOUTH EVERY NIGHT. 30 tablet 5   • levothyroxine (SYNTHROID, LEVOTHROID) 100 MCG tablet TAKE 1 TABLET DAILY 90 tablet 1   • LORazepam (ATIVAN) 1 MG tablet Take 1 tablet by mouth At Night As Needed for Anxiety or Sleep. 30 tablet 2   • metFORMIN (GLUCOPHAGE) 850 MG tablet TAKE 1 TABLET TWICE A DAY  WITH MEALS 180 tablet 1   • metoprolol succinate XL (TOPROL-XL) 25 MG 24 hr tablet Take 1 tablet by mouth Daily. 90 tablet 3   • montelukast (SINGULAIR) 10 MG tablet   6   • montelukast (SINGULAIR) 10 MG tablet TAKE 1 TABLET BY MOUTH EVERY NIGHT  DAYS. 30 tablet 6   • Needle, Disp, 31G X 5/16\" misc 1 each 4 (Four) Times a Day. Pen Needle; 400 each 3   • nitrofurantoin (MACRODANTIN) 100 MG capsule 1 capsule twice a day 20 capsule 0   • " TRUE METRIX BLOOD GLUCOSE TEST test strip USE TO TEST FOUR TIMES DAILY 150 each 11   • TRUEPLUS LANCETS 28G misc TEST FOUR TIMES A  each 11   • [DISCONTINUED] insulin aspart (novoLOG FLEXPEN) 100 UNIT/ML solution pen-injector sc pen Inject 2-8 Units under the skin into the appropriate area as directed 3 (Three) Times a Day With Meals. 15 pen 3     No facility-administered encounter medications on file as of 2/8/2019.        Orders placed during this encounter include:  Orders Placed This Encounter   Procedures   • Comprehensive Metabolic Panel     Standing Status:   Future     Standing Expiration Date:   2/8/2020   • Hemoglobin A1c     Standing Status:   Future     Standing Expiration Date:   2/8/2020   • Lipid Panel     Standing Status:   Future     Standing Expiration Date:   2/8/2020   • TSH     Standing Status:   Future     Standing Expiration Date:   2/8/2020   • Vitamin D 25 Hydroxy     Standing Status:   Future     Standing Expiration Date:   2/8/2020   • Vitamin B12     Standing Status:   Future     Standing Expiration Date:   2/8/2020   • CBC & Differential     Standing Status:   Future     Standing Expiration Date:   2/8/2020     Order Specific Question:   Manual Differential     Answer:   No     Glycemic Management      Lab Results   Component Value Date    HGBA1C 7.2 (H) 02/05/2019                      Novolog mix stopped   Januvia stopped it due to headaches  Glimepiride stopped caused higher sugars         Metformin 850 mg one tablet po BID         Basaglar 15 units      Novolog for meals      Breakfast -- 7     Lunch -- 8     Supper -- 9                         Lipid Management      Simvastatin 20 mg one at bedtime      Lipid not fasting      Component      Latest Ref Rng & Units 2/5/2019             Total Cholesterol      150 - 200 mg/dL 188   Triglycerides      <=150 mg/dL 151 (H)   HDL Cholesterol      40 - 59 mg/dL 55   LDL Cholesterol      <=100 mg/dL 103 (H)   VLDL Cholesterol      mg/dL  30.2   LDL/HDL Ratio      0.00 - 3.22 1.87           Blood Pressure Management      Lisinopril 20 mg daily-- stopped     amlodipine 5 mg daily      Valsartan -- on recall            Microvascular Complication Monitoring      Cymbalta 60 mg one daily      Gabapentin 300 mg one TID        hydrocodone-acetaminophen 7.5- 500 mg 2 times daily      3- 15 Microalbuminuria            Bone Health      h o vitamin d def      vitamin d 50,000 units weekly      Component      Latest Ref Rng & Units 7/18/2018   25 Hydroxy, Vitamin D      30.0 - 100.0 ng/ml 46.2            Immunization: influenza vaccine Oct. 2018 , shingles vaccine Oct. 2016         Other Diabetes Related Aspects         Hypothyroidism           Levothyroxine 100 mcg one daily Monday through Friday and 1/2 tablet on Saturday and none on Sunday              Lab Results   Component Value Date     TSH 2.500 07/18/2018         Lab Results   Component Value Date    TSH 1.460 02/05/2019            4. Follow-up: Return in about 6 months (around 8/8/2019) for Recheck.

## 2019-02-19 ENCOUNTER — TELEPHONE (OUTPATIENT)
Dept: ENDOCRINOLOGY | Facility: CLINIC | Age: 84
End: 2019-02-19

## 2019-02-19 DIAGNOSIS — M79.672 BILATERAL FOOT PAIN: ICD-10-CM

## 2019-02-19 DIAGNOSIS — M79.671 BILATERAL FOOT PAIN: ICD-10-CM

## 2019-02-19 DIAGNOSIS — G89.29 CHRONIC PAIN OF BOTH KNEES: Primary | ICD-10-CM

## 2019-02-19 DIAGNOSIS — M25.561 CHRONIC PAIN OF BOTH KNEES: Primary | ICD-10-CM

## 2019-02-19 DIAGNOSIS — M25.562 CHRONIC PAIN OF BOTH KNEES: Primary | ICD-10-CM

## 2019-02-20 ENCOUNTER — OFFICE VISIT (OUTPATIENT)
Dept: ORTHOPEDIC SURGERY | Facility: CLINIC | Age: 84
End: 2019-02-20

## 2019-02-20 VITALS — HEIGHT: 66 IN | BODY MASS INDEX: 30.05 KG/M2 | WEIGHT: 187 LBS

## 2019-02-20 DIAGNOSIS — G89.29 CHRONIC PAIN OF BOTH KNEES: Primary | ICD-10-CM

## 2019-02-20 DIAGNOSIS — M17.0 PRIMARY OSTEOARTHRITIS OF BOTH KNEES: ICD-10-CM

## 2019-02-20 DIAGNOSIS — M25.561 CHRONIC PAIN OF BOTH KNEES: Primary | ICD-10-CM

## 2019-02-20 DIAGNOSIS — M25.562 CHRONIC PAIN OF BOTH KNEES: Primary | ICD-10-CM

## 2019-02-20 PROCEDURE — 20610 DRAIN/INJ JOINT/BURSA W/O US: CPT | Performed by: NURSE PRACTITIONER

## 2019-02-20 PROCEDURE — 99214 OFFICE O/P EST MOD 30 MIN: CPT | Performed by: NURSE PRACTITIONER

## 2019-02-20 RX ORDER — LIDOCAINE HYDROCHLORIDE 10 MG/ML
2 INJECTION, SOLUTION EPIDURAL; INFILTRATION; INTRACAUDAL; PERINEURAL
Status: COMPLETED | OUTPATIENT
Start: 2019-02-20 | End: 2019-02-20

## 2019-02-20 RX ORDER — TRIAMCINOLONE ACETONIDE 40 MG/ML
40 INJECTION, SUSPENSION INTRA-ARTICULAR; INTRAMUSCULAR
Status: COMPLETED | OUTPATIENT
Start: 2019-02-20 | End: 2019-02-20

## 2019-02-20 RX ADMIN — LIDOCAINE HYDROCHLORIDE 2 ML: 10 INJECTION, SOLUTION EPIDURAL; INFILTRATION; INTRACAUDAL; PERINEURAL at 11:49

## 2019-02-20 RX ADMIN — TRIAMCINOLONE ACETONIDE 40 MG: 40 INJECTION, SUSPENSION INTRA-ARTICULAR; INTRAMUSCULAR at 11:49

## 2019-02-20 NOTE — PROGRESS NOTES
"Nyla Piña is a 83 y.o. female returns for     Chief Complaint   Patient presents with   • Left Knee - Pain   • Right Knee - Pain       HISTORY OF PRESENT ILLNESS: Patient presents to office for follow-up of chronic bilateral knee pain related to osteoarthritis with worse pain in the right knee.  Patient does report a recent fall but states that she fell onto her bottom, not her knees.  She does report some increased bilateral knee pain since her fall but that has been improving gradually and she is at her baseline for chronic knee pain.  Patient ambulates with assistance of a walker.  Patient was last seen in office per Dr. Lozano on 8/8/2018 and given intra-articular injections of steroid, which offered her good pain relief until recently.  Patient requests an evaluation for repeat intra-articular injections of steroid to help with her chronic knee pain.  Patient has also completed an Orthovisc series in August 2016 and received a Synvisc One injection in the right knee on 2/6/2018.  Patient has done physical therapy in the past and continues with home exercises for strengthening and conditioning.  X-rays are repeated today.     CONCURRENT MEDICAL HISTORY:    The following portions of the patient's history were reviewed and updated as appropriate: allergies, current medications, past family history, past medical history, past social history, past surgical history and problem list.     ROS  No fevers or chills.  No chest pain or shortness of air.  No GI or  disturbances. Right knee pain. Left knee pain.     PHYSICAL EXAMINATION:       Ht 167.6 cm (66\")   Wt 84.8 kg (187 lb)   BMI 30.18 kg/m²     Physical Exam   Constitutional: She is oriented to person, place, and time. Vital signs are normal. She appears well-developed and well-nourished. She is active and cooperative. She does not appear ill. No distress.   HENT:   Head: Normocephalic.   Pulmonary/Chest: Effort normal. No respiratory distress. "   Abdominal: Soft. She exhibits no distension.   Musculoskeletal: She exhibits edema (Mild, Right knee, Left knee) and tenderness (Right knee). She exhibits no deformity.   Neurological: She is alert and oriented to person, place, and time. GCS eye subscore is 4. GCS verbal subscore is 5. GCS motor subscore is 6.   Skin: Skin is warm, dry and intact. Capillary refill takes less than 2 seconds. No erythema.   Psychiatric: She has a normal mood and affect. Her speech is normal and behavior is normal. Judgment and thought content normal. Cognition and memory are normal.   Vitals reviewed.      GAIT:     []  Normal  [x]  Antalgic    Assistive device: []  None  [x]  Walker     []  Crutches  []  Cane     []  Wheelchair  []  Stretcher    Right Knee Exam     Muscle Strength   The patient has normal right knee strength.    Tenderness   The patient is experiencing tenderness in the medial joint line and lateral joint line.    Range of Motion   Extension: 5   Flexion: 120     Tests   Varus: negative Valgus: negative  Drawer:  Anterior - negative    Posterior - negative    Other   Erythema: absent  Sensation: normal  Pulse: present  Swelling: mild    Comments:  Pain and limitations with range of motion.  Crepitus with range of motion.  Mild swelling present, primarily to the medial aspect of the knee.  No definitive effusion.  No erythema.  No warmth.  No ecchymosis.  Skin is intact.  No signs of injury noted.  Stable joint exam.      Left Knee Exam     Muscle Strength   The patient has normal left knee strength.    Tenderness   The patient is experiencing no tenderness.     Range of Motion   Extension: 5   Flexion: 120     Tests   Varus: negative Valgus: negative  Drawer:  Anterior - negative     Posterior - negative    Other   Erythema: absent  Sensation: normal  Pulse: present  Swelling: none    Comments:  Mild pain and limitations with range of motion.  Crepitus with range of motion.  Mild swelling present, primarily to the  medial aspect of the knee.  No definitive effusion.  No erythema.  No warmth.  No ecchymosis.  Skin is intact.  No signs of injury noted.  Stable joint exam.            Xr Knee Bilateral Ap Standing    Result Date: 2/20/2019  Narrative: AP standing view of bilateral knees reveals degenerative changes bilaterally, more pronounced in the right knee.  There is severe loss of lateral compartmental joint spacing noted in the right knee with near bone-on-bone findings.  This appears slightly more advanced when compared with prior images from 1/2/2018.  There are marginal osteophytes noted in both the medial and lateral compartments of the knees bilaterally.  There is a valgus deformity noted of the right knee.  There is mild to moderate loss of medial and lateral compartmental joint spacing noted in the left knee.  No evidence of acute fracture or dislocation.  No acute bony radiologic abnormalities are noted at this time.  No significant changes are noted when compared with prior images from 1/2/2018.02/20/19 at 12:08 PM by MARTHA Yee    Xr Knee 1 Or 2 View Bilateral    Result Date: 2/20/2019  Narrative: Lateral views of the bilateral knees reveal no evidence of acute fracture or dislocation.  There are degenerative changes noted bilaterally.  There is moderate to severe loss of patellofemoral joint spacing noted.  There are osteophyte formations noted to the superior poles of the patellas bilaterally.  There are calcified loose bodies noted in the posterior compartments of each knee.  No acute bony radiologic abnormalities are noted at this time.  No significant changes are noted when compared with prior images from 1/2/2018.02/20/19 at 12:12 PM by MARTHA Yee     Large Joint Arthrocentesis: R knee  Date/Time: 2/20/2019 11:49 AM  Consent given by: patient  Site marked: site marked  Timeout: Immediately prior to procedure a time out was called to verify the correct patient, procedure, equipment, support  staff and site/side marked as required   Supporting Documentation  Indications: pain, joint swelling and diagnostic evaluation   Procedure Details  Location: knee - R knee  Preparation: Patient was prepped and draped in the usual sterile fashion  Needle size: 22 G  Approach: anteromedial  Medications administered: 2 mL lidocaine PF 1% 1 %; 40 mg triamcinolone acetonide 40 MG/ML  Patient tolerance: patient tolerated the procedure well with no immediate complications    Large Joint Arthrocentesis: L knee  Date/Time: 2/20/2019 11:49 AM  Consent given by: patient  Site marked: site marked  Timeout: Immediately prior to procedure a time out was called to verify the correct patient, procedure, equipment, support staff and site/side marked as required   Supporting Documentation  Indications: pain, joint swelling and diagnostic evaluation   Procedure Details  Location: knee - L knee  Preparation: Patient was prepped and draped in the usual sterile fashion  Needle size: 22 G  Approach: anteromedial  Medications administered: 2 mL lidocaine PF 1% 1 %; 40 mg triamcinolone acetonide 40 MG/ML  Patient tolerance: patient tolerated the procedure well with no immediate complications            ASSESSMENT:    Diagnoses and all orders for this visit:    Chronic pain of both knees  -     Large Joint Arthrocentesis: R knee  -     Large Joint Arthrocentesis: L knee    Primary osteoarthritis of both knees  -     Large Joint Arthrocentesis: R knee  -     Large Joint Arthrocentesis: L knee    PLAN    AP standing x-ray of bilateral knees and lateral x-rays of each knee are repeated in office today and reviewed with no acute findings noted.  Patient complains of increased chronic bilateral knee pain recently.  She does report a recent fall with some increased knee pain following, but states that her acute pain has improved and she is at her baseline now.  Patient denies any fall onto the knees and states that she fell onto her bottom.   Patient has had ongoing chronic bilateral knee pain related to osteoarthritis for several years with worse symptoms in the right knee.  Recommend repeat intra-articular injections of steroid today for management of knee pain/inflammation/swelling.  Last injections were given on 8/8/2018 per Dr. Lozano, which offered her good pain relief for a few months.  Patient has also done Orthovisc series in August 2016, a Synvisc One injection in the right knee on 2/6/2018, use of a walker for modified weightbearing, physical therapy and home exercises for treatment of her knee pain.  She is not currently interested in knee arthroplasty.  Continue with modified weightbearing with use of her walker.  Recommend rest and activity modification during times of increased pain.  Patient can progress her activity as tolerated as the pain improves.  Recommend Tylenol or Tylenol arthritis as needed for pain.  Patient also has Voltaren gel that she can use as needed for knee pain.  Recommend elevation and ice therapy to the bilateral knees as needed to minimize pain/swelling/inflammation.  Continue with home exercises for strengthening and conditioning of the knees.  Follow-up in 3 months for recheck as needed for any new, worsening or persistent symptoms and repeat intra-articular injections of steroid as needed/indicated.    Return in about 3 months (around 5/20/2019), or if symptoms worsen or fail to improve, for Recheck.        This document has been electronically signed by MARTHA Yee on February 20, 2019 12:16 PM      MARTHA Yee

## 2019-02-27 ENCOUNTER — OFFICE VISIT (OUTPATIENT)
Dept: FAMILY MEDICINE CLINIC | Facility: CLINIC | Age: 84
End: 2019-02-27

## 2019-02-27 VITALS
TEMPERATURE: 97.8 F | BODY MASS INDEX: 30.05 KG/M2 | OXYGEN SATURATION: 95 % | SYSTOLIC BLOOD PRESSURE: 118 MMHG | WEIGHT: 187 LBS | HEART RATE: 90 BPM | HEIGHT: 66 IN | DIASTOLIC BLOOD PRESSURE: 74 MMHG

## 2019-02-27 DIAGNOSIS — N39.0 URINARY TRACT INFECTION WITHOUT HEMATURIA, SITE UNSPECIFIED: Primary | ICD-10-CM

## 2019-02-27 LAB
BILIRUB BLD-MCNC: NEGATIVE MG/DL
CLARITY, POC: CLEAR
COLOR UR: ABNORMAL
GLUCOSE UR STRIP-MCNC: NEGATIVE MG/DL
KETONES UR QL: NEGATIVE
LEUKOCYTE EST, POC: ABNORMAL
NITRITE UR-MCNC: NEGATIVE MG/ML
PH UR: 6 [PH] (ref 5–8)
PROT UR STRIP-MCNC: ABNORMAL MG/DL
RBC # UR STRIP: NEGATIVE /UL
SP GR UR: 1.01 (ref 1–1.03)
UROBILINOGEN UR QL: NORMAL

## 2019-02-27 PROCEDURE — 81002 URINALYSIS NONAUTO W/O SCOPE: CPT | Performed by: PHYSICIAN ASSISTANT

## 2019-02-27 PROCEDURE — 99213 OFFICE O/P EST LOW 20 MIN: CPT | Performed by: PHYSICIAN ASSISTANT

## 2019-02-27 RX ORDER — NITROFURANTOIN 25; 75 MG/1; MG/1
100 CAPSULE ORAL 2 TIMES DAILY
Qty: 14 CAPSULE | Refills: 0 | Status: SHIPPED | OUTPATIENT
Start: 2019-02-27 | End: 2019-03-06

## 2019-02-27 NOTE — PROGRESS NOTES
Subjective   Nyla Piña is a 83 y.o. female.     Urinary Tract Infection    This is a new problem. The current episode started in the past 7 days. The problem has been unchanged. The quality of the pain is described as aching. The pain is at a severity of 2/10. The pain is mild. There has been no fever. She is sexually active. There is no history of pyelonephritis. Associated symptoms include a discharge (yellow), hesitancy and urgency. Pertinent negatives include no chills, flank pain, frequency, hematuria, nausea, possible pregnancy, sweats or vomiting. She has tried increased fluids for the symptoms. The treatment provided no relief. Her past medical history is significant for recurrent UTIs. There is no history of catheterization, kidney stones, a single kidney, urinary stasis or a urological procedure.      Pt admits to suprapubic pressure & discomfort x 2 days. Pt admits to hx of frequent UTIs, but has not had one recently.   The following portions of the patient's history were reviewed and updated as appropriate: allergies, current medications, past family history, past medical history, past social history, past surgical history and problem list.     Review of Systems   Constitutional: Positive for fatigue. Negative for activity change, appetite change, chills, diaphoresis, fever, unexpected weight gain and unexpected weight loss.   HENT: Negative for congestion, dental problem, drooling, ear discharge, ear pain, facial swelling, hearing loss, mouth sores, nosebleeds, postnasal drip, rhinorrhea, sinus pressure, sneezing, sore throat, tinnitus, trouble swallowing and voice change.    Eyes: Negative for blurred vision, double vision and visual disturbance.   Respiratory: Negative for apnea, cough, choking, chest tightness, shortness of breath, wheezing and stridor.    Cardiovascular: Negative for chest pain, palpitations and leg swelling.   Gastrointestinal: Negative for abdominal distention,  abdominal pain, constipation, diarrhea, nausea, vomiting, GERD and indigestion.   Endocrine: Negative.    Genitourinary: Positive for decreased urine volume, difficulty urinating, hesitancy, pelvic pain (suprapubic pressure) and urgency. Negative for urinary incontinence, dysuria, flank pain, frequency, hematuria, vaginal bleeding, vaginal discharge and vaginal pain.   Musculoskeletal: Negative for arthralgias, back pain, gait problem, joint swelling, myalgias, neck pain, neck stiffness and bursitis.   Skin: Negative.    Neurological: Negative for dizziness, weakness, light-headedness, headache and confusion.   Psychiatric/Behavioral: Negative.        Objective   Physical Exam   Constitutional: She is oriented to person, place, and time. She appears well-developed and well-nourished. No distress.   HENT:   Head: Normocephalic and atraumatic.   Right Ear: External ear normal.   Left Ear: External ear normal.   Nose: Nose normal.   Mouth/Throat: Oropharynx is clear and moist. No oropharyngeal exudate.   Eyes: Conjunctivae and EOM are normal. Pupils are equal, round, and reactive to light.   Neck: Normal range of motion. Neck supple.   Cardiovascular: Normal rate, regular rhythm and intact distal pulses. Exam reveals no gallop and no friction rub.   Murmur (holosystolic, 2/6) heard.  Pulmonary/Chest: Effort normal and breath sounds normal. No stridor. No respiratory distress. She has no wheezes. She has no rales. She exhibits no tenderness.   Abdominal: Soft. Bowel sounds are normal. She exhibits no distension and no mass. There is no tenderness. There is no rigidity, no rebound, no guarding, no CVA tenderness, no tenderness at McBurney's point and negative Bai's sign. No hernia.   BS active x 4 quadrants. Negative CVA tenderness bilaterally. Negative suprapubic tenderness.    Musculoskeletal: Normal range of motion.   Neurological: She is alert and oriented to person, place, and time.   Skin: Skin is warm and dry.  Capillary refill takes less than 2 seconds. No rash noted. She is not diaphoretic. No erythema. No pallor.   Psychiatric: She has a normal mood and affect. Her behavior is normal. Judgment and thought content normal.   Nursing note and vitals reviewed.    Vitals:    02/27/19 0952   BP: 118/74   Pulse: 90   Temp: 97.8 °F (36.6 °C)   SpO2: 95%         Assessment/Plan   Nyla was seen today for urinary tract infection.    Diagnoses and all orders for this visit:    Urinary tract infection without hematuria, site unspecified  -     nitrofurantoin, macrocrystal-monohydrate, (MACROBID) 100 MG capsule; Take 1 capsule by mouth 2 (Two) Times a Day for 7 days.  -     POCT urinalysis dipstick, manual      UTI without hematuria - urinalysis - +2 leukocytes, negative nitrites, negative blood. Prescription for macrobid, as above, sent to pharmacy. Advised pt if sx do not improve after 2-3 days of medication, to RTC for reevaluation. Advised pt to increase fluid intake. Discussed hygiene of wiping front to back, voiding every 2 hours, pre & post coital voiding.     Patient educated to follow up sooner than next scheduled appointment if symptoms worsen or do not improve. Patient stated understanding and has agreed with plan of care. After visit summary was printed and given to patient.       This document has been electronically signed by Nataly Paul PA-C on February 27, 2019 11:01 AM,.

## 2019-03-07 ENCOUNTER — OFFICE VISIT (OUTPATIENT)
Dept: FAMILY MEDICINE CLINIC | Facility: CLINIC | Age: 84
End: 2019-03-07

## 2019-03-07 VITALS
HEIGHT: 66 IN | BODY MASS INDEX: 30.05 KG/M2 | SYSTOLIC BLOOD PRESSURE: 124 MMHG | DIASTOLIC BLOOD PRESSURE: 68 MMHG | WEIGHT: 187 LBS

## 2019-03-07 DIAGNOSIS — R94.31 ABNORMAL ELECTROCARDIOGRAM: ICD-10-CM

## 2019-03-07 DIAGNOSIS — R53.1 GENERAL WEAKNESS: ICD-10-CM

## 2019-03-07 DIAGNOSIS — R00.0 SINUS TACHYCARDIA: Primary | ICD-10-CM

## 2019-03-07 PROCEDURE — 96372 THER/PROPH/DIAG INJ SC/IM: CPT | Performed by: FAMILY MEDICINE

## 2019-03-07 PROCEDURE — 99214 OFFICE O/P EST MOD 30 MIN: CPT | Performed by: FAMILY MEDICINE

## 2019-03-07 RX ORDER — CYANOCOBALAMIN 1000 UG/ML
1000 INJECTION, SOLUTION INTRAMUSCULAR; SUBCUTANEOUS
Status: SHIPPED | OUTPATIENT
Start: 2019-03-07

## 2019-03-07 RX ADMIN — CYANOCOBALAMIN 1000 MCG: 1000 INJECTION, SOLUTION INTRAMUSCULAR; SUBCUTANEOUS at 11:45

## 2019-03-07 NOTE — PROGRESS NOTES
Subjective   Nyla Piña is a 83 y.o. female with hypertension, diabetes, and arthritis here today for continued concern about extreme weakness.  We did some recent labs and everything was basically normal.  She sleeps well at night and says she does not nap during the day but just feels her muscles are so weak she cannot continue to do her usual daily activities.  She did have an echocardiogram and a 24-hour Holter last year and saw cardiology.  She was started on Toprol by her cardiologist.  Another doctor evidently discontinued it but she saw her cardiologist who put her back on it.  She had sinus tachycardia with rates as high as 210.    History of Present Illness   Back Pain   This is a chronic problem. The current episode started more than 1 year ago. The problem occurs constantly. The problem is unchanged. The pain is present in the thoracic spine. The quality of the pain is described as shooting, stabbing and aching.  . The pain is at a severity of 8/10. The pain is severe. The pain is the same all the time. The symptoms are aggravated by standing, twisting, position, bending and sitting. Stiffness is present at night, all day and in the morning. Associated symptoms include leg pain and tingling. Pertinent negatives include no abdominal pain, bladder incontinence, bowel incontinence, chest pain, dysuria, fever, headaches, numbness, paresis, paresthesias, pelvic pain, perianal numbness, weakness or weight loss. Risk factors include sedentary lifestyle. Patient has tried analgesics, NSAIDs, muscle relaxant and heat for the symptoms. The treatment provided mild relief.     The following portions of the patient's history were reviewed and updated as appropriate: allergies, current medications, past family history, past medical history, past social history, past surgical history and problem list.    Review of Systems   HENT: Negative.    Cardiovascular: Negative.    Gastrointestinal: Negative.     Genitourinary: Negative for flank pain, frequency, hematuria and urgency.   Musculoskeletal: Positive for gait problem.   Skin: Negative.    Allergic/Immunologic: Negative for immunocompromised state.   Neurological: Negative for syncope.   Hematological: Negative.    Psychiatric/Behavioral: Positive for sleep disturbance. Negative for agitation and dysphoric mood.   All other systems reviewed and are negative.    Objective   Physical Exam   Constitutional: She is oriented to person, place, and time. She appears well-developed and well-nourished.   HENT:   Head: Normocephalic and atraumatic.   Nose: Nose normal.   Mouth/Throat: Oropharynx is clear and moist.       Eyes: Conjunctivae and EOM are normal. Pupils are equal, round, and reactive to light.   Neck: Normal range of motion. Neck supple. No JVD present. No tracheal deviation present. No thyromegaly present.   Cardiovascular: Regular rhythm and intact distal pulses.   Murmur heard.  2/6 holosystolic murmur is noted today   Pulmonary/Chest: Effort normal and breath sounds normal. She has no wheezes.   Abdominal: Soft. Bowel sounds are normal. She exhibits no distension. There is no tenderness.   Musculoskeletal: She exhibits tenderness. She exhibits no edema.   Lymphadenopathy:     She has no cervical adenopathy.   Neurological: She is alert and oriented to person, place, and time. Coordination normal.   Skin: Skin is warm and dry. No rash noted.       Psychiatric: She has a normal mood and affect.   Nursing note and vitals reviewed.    Assessment/Plan   Nyla was seen today for extremity weakness.    Diagnoses and all orders for this visit:    Sinus tachycardia  -     Holter monitor - 24 hour; Future  -     Adult Transthoracic Echo Complete W/ Cont if Necessary Per Protocol; Future    General weakness  -     cyanocobalamin injection 1,000 mcg; Inject 1 mL into the appropriate muscle as directed by prescriber Every 28 (Twenty-Eight) Days.    Abnormal  electrocardiogram   -     Adult Transthoracic Echo Complete W/ Cont if Necessary Per Protocol; Future    We will get the echo and Holter done again as it has been a year and she had some abnormalities.  We will see about follow-up with cardiology.  Reviewed her labs.  She did have a B12 deficiency but I am not convinced that that alone has caused her severe weakness.  We will continue to follow closely.  B12 shot is given today           This document has been electronically signed by Chantel Long MD on March 7, 2019 11:15 AM

## 2019-03-25 RX ORDER — PEN NEEDLE, DIABETIC 31 GX5/16"
NEEDLE, DISPOSABLE MISCELLANEOUS
Qty: 200 EACH | Refills: 5 | Status: SHIPPED | OUTPATIENT
Start: 2019-03-25 | End: 2019-10-08 | Stop reason: SDUPTHER

## 2019-03-28 ENCOUNTER — OFFICE VISIT (OUTPATIENT)
Dept: FAMILY MEDICINE CLINIC | Facility: CLINIC | Age: 84
End: 2019-03-28

## 2019-03-28 VITALS
HEIGHT: 66 IN | BODY MASS INDEX: 30.05 KG/M2 | WEIGHT: 187 LBS | DIASTOLIC BLOOD PRESSURE: 58 MMHG | SYSTOLIC BLOOD PRESSURE: 86 MMHG

## 2019-03-28 DIAGNOSIS — M48.50XA VERTEBRAL COMPRESSION FRACTURE (HCC): ICD-10-CM

## 2019-03-28 DIAGNOSIS — M54.50 ACUTE MIDLINE LOW BACK PAIN WITHOUT SCIATICA: ICD-10-CM

## 2019-03-28 DIAGNOSIS — S79.919A HIP INJURY, UNSPECIFIED LATERALITY, INITIAL ENCOUNTER: Primary | ICD-10-CM

## 2019-03-28 DIAGNOSIS — F41.9 ANXIETY: ICD-10-CM

## 2019-03-28 DIAGNOSIS — R29.6 FALLS FREQUENTLY: ICD-10-CM

## 2019-03-28 PROCEDURE — 99214 OFFICE O/P EST MOD 30 MIN: CPT | Performed by: FAMILY MEDICINE

## 2019-03-28 RX ORDER — HYDROCODONE BITARTRATE AND ACETAMINOPHEN 7.5; 325 MG/1; MG/1
1 TABLET ORAL
Qty: 60 TABLET | Refills: 0 | Status: SHIPPED | OUTPATIENT
Start: 2019-03-28 | End: 2019-06-26 | Stop reason: SDUPTHER

## 2019-03-28 RX ORDER — GABAPENTIN 300 MG/1
CAPSULE ORAL
Qty: 90 CAPSULE | Refills: 2 | Status: SHIPPED | OUTPATIENT
Start: 2019-03-28 | End: 2019-06-26 | Stop reason: SDUPTHER

## 2019-03-28 RX ORDER — LORAZEPAM 1 MG/1
1 TABLET ORAL NIGHTLY PRN
Qty: 30 TABLET | Refills: 2 | Status: SHIPPED | OUTPATIENT
Start: 2019-03-28 | End: 2019-06-27 | Stop reason: SDUPTHER

## 2019-03-28 NOTE — PROGRESS NOTES
Subjective   Nyla Piña is a 83 y.o. female with hypertension, diabetes, and arthritis here today after a fall.  She is been having frequent falls and 2 weeks ago she fell and hit her low back and has had pain in her low back and hips.  She said her legs are just weak and they give out.  She has not been able to walk well since that time and she has been previously ambulatory.    History of Present Illness   Back Pain   This is a new problem. The current episode started 2 weeks ago. The problem occurs constantly. The problem is unchanged. The pain is present in the low back. The quality of the pain is described as shooting, stabbing and aching.  . The pain is at a severity of 8/10. The pain is severe. The pain is the same all the time. The symptoms are aggravated by standing, twisting, position, bending and sitting. Stiffness is present at night, all day and in the morning. Associated symptoms include leg pain and tingling. Pertinent negatives include no abdominal pain, bladder incontinence, bowel incontinence, chest pain, dysuria, fever, headaches, numbness, paresis, paresthesias, pelvic pain, perianal numbness, weakness or weight loss. Risk factors include sedentary lifestyle. Patient has tried analgesics, NSAIDs, muscle relaxant and heat for the symptoms. The treatment provided mild relief.     The following portions of the patient's history were reviewed and updated as appropriate: allergies, current medications, past family history, past medical history, past social history, past surgical history and problem list.    Review of Systems   HENT: Negative.    Cardiovascular: Negative.    Gastrointestinal: Negative.    Genitourinary: Negative for flank pain, frequency, hematuria and urgency.   Musculoskeletal: Positive for gait problem.   Skin: Negative.    Allergic/Immunologic: Negative for immunocompromised state.   Neurological: Negative for syncope.   Hematological: Negative.    Psychiatric/Behavioral:  Positive for sleep disturbance. Negative for agitation and dysphoric mood.   All other systems reviewed and are negative.    Objective   Physical Exam   Constitutional: She is oriented to person, place, and time. She appears well-developed and well-nourished.   HENT:   Head: Normocephalic and atraumatic.   Nose: Nose normal.   Mouth/Throat: Oropharynx is clear and moist.       Eyes: Conjunctivae and EOM are normal. Pupils are equal, round, and reactive to light.   Neck: Normal range of motion. Neck supple. No JVD present. No tracheal deviation present. No thyromegaly present.   Cardiovascular: Regular rhythm and intact distal pulses.   Murmur heard.  2/6 holosystolic murmur is noted today   Pulmonary/Chest: Effort normal and breath sounds normal. She has no wheezes.   Abdominal: Soft. Bowel sounds are normal. She exhibits no distension. There is no tenderness.   Musculoskeletal: She exhibits tenderness. She exhibits no edema.   Patient in a wheelchair, she is unable to ambulate today.  She has a lot of tenderness in the low back.  She also has difficulty moving both hips and pain with weightbearing   Lymphadenopathy:     She has no cervical adenopathy.   Neurological: She is alert and oriented to person, place, and time. Coordination normal.   Skin: Skin is warm and dry. No rash noted.       Psychiatric: She has a normal mood and affect.   Nursing note and vitals reviewed.  PACS Images      Radiology Images   Study Result     Procedure: Lumbar spine 3 views     Reason for exam: Fall 2 weeks ago. Pain in hips.     FINDINGS: Acute to subacute L1 anterior vertebral body wedge  compression fracture with loss of anterior vertebral body height  by 20%. Otherwise lumbar spine vertebral body heights and  alignment are normal. Intervertebral disc spaces are intact.     IMPRESSION:  1.  Acute to subacute L1 anterior vertebral body wedge  compression fracture with loss of anterior vertebral body height  by 20%.   2.  Otherwise  unremarkable lumbar spine.           PROCEDURE: AP pelvis and bilateral hip 2 views     Reason for exam: Fall 2 weeks ago. Pain in hips.     FINDINGS: Bony structures of the pelvis and bilateral hips are  normal. There is no evidence of fracture or dislocation.     IMPRESSION: Negative pelvis and bilateral hips.     Electronically signed by:  Benson Mir MD  3/28/2019 2:24 PM CDT  Workstation: [id number]      Radiology Images   Study Result     Procedure: Lumbar spine 3 views     Reason for exam: Fall 2 weeks ago. Pain in hips.     FINDINGS: Acute to subacute L1 anterior vertebral body wedge  compression fracture with loss of anterior vertebral body height  by 20%. Otherwise lumbar spine vertebral body heights and  alignment are normal. Intervertebral disc spaces are intact.     IMPRESSION:  1.  Acute to subacute L1 anterior vertebral body wedge  compression fracture with loss of anterior vertebral body height  by 20%.   2.  Otherwise unremarkable lumbar spine.           PROCEDURE: AP pelvis and bilateral hip 2 views     Reason for exam: Fall 2 weeks ago. Pain in hips.     FINDINGS: Bony structures of the pelvis and bilateral hips are  normal. There is no evidence of fracture or dislocation.     IMPRESSION: Negative pelvis and bilateral hips.     Electronically signed by:  Benson Mir MD  3/28/2019 2:24 PM CDT  Workstation: [id number]       Assessment/Plan   Nyla was seen today for follow-up.    Diagnoses and all orders for this visit:    Hip injury, unspecified laterality, initial encounter  -     XR Hips Bilateral With or Without Pelvis 2 View    Acute midline low back pain without sciatica  -     XR Spine Lumbar 2 or 3 View    Falls frequently    Anxiety    Vertebral compression fracture (CMS/HCC)  -     Ambulatory Referral to Physical Therapy    Other orders  -     HYDROcodone-acetaminophen (NORCO) 7.5-325 MG per tablet; Take 1 tablet by mouth 2 (Two) Times a Day.  -     gabapentin (NEURONTIN) 300 MG capsule; TAKE  ONE CAPSULE BY MOUTH THREE TIMES A DAY  -     LORazepam (ATIVAN) 1 MG tablet; Take 1 tablet by mouth At Night As Needed for Anxiety or Sleep.    The patient has read and signed the [facility] Controlled Substance Contract.  I will continue to see patient for regular follow up appointments. Patient is well controlled on the medication.  ANGELITO has been reviewed by me and is updated every 3 months. The patient is aware of the potential for addiction and dependence.   Will send to PT to help with strengthening to hopefully prevent falls.  Continue with pain medication, rest.  I don't feel she would be a candidate for back surgery.  Discussed fall precautions. She has a walker and a wheelchair.   Continue lorazepam for anxiety at night, understands not to take with pain meds.  She uses both only prn.           This document has been electronically signed by Chantel Long MD on March 28, 2019 1:27 PM

## 2019-04-02 DIAGNOSIS — R29.6 FALLS FREQUENTLY: ICD-10-CM

## 2019-04-02 DIAGNOSIS — M54.50 LOW BACK PAIN, NON-SPECIFIC: Primary | ICD-10-CM

## 2019-04-05 RX ORDER — AMLODIPINE BESYLATE 10 MG/1
10 TABLET ORAL DAILY
Qty: 30 TABLET | Refills: 4 | Status: SHIPPED | OUTPATIENT
Start: 2019-04-05 | End: 2019-09-26 | Stop reason: SDUPTHER

## 2019-04-30 RX ORDER — LEVOTHYROXINE SODIUM 0.1 MG/1
TABLET ORAL
Qty: 90 TABLET | Refills: 1 | Status: SHIPPED | OUTPATIENT
Start: 2019-04-30 | End: 2019-10-27 | Stop reason: SDUPTHER

## 2019-05-22 DIAGNOSIS — R30.0 DYSURIA: Primary | ICD-10-CM

## 2019-05-22 LAB
BACTERIA UR QL AUTO: ABNORMAL /HPF
BILIRUB UR QL STRIP: NEGATIVE
CLARITY UR: ABNORMAL
COLOR UR: YELLOW
GLUCOSE UR STRIP-MCNC: NEGATIVE MG/DL
HGB UR QL STRIP.AUTO: ABNORMAL
HYALINE CASTS UR QL AUTO: ABNORMAL /LPF
KETONES UR QL STRIP: NEGATIVE
LEUKOCYTE ESTERASE UR QL STRIP.AUTO: ABNORMAL
NITRITE UR QL STRIP: POSITIVE
PH UR STRIP.AUTO: 7.5 [PH] (ref 5.5–8)
PROT UR QL STRIP: ABNORMAL
RBC # UR: ABNORMAL /HPF
REF LAB TEST METHOD: ABNORMAL
SP GR UR STRIP: 1.01 (ref 1–1.03)
SQUAMOUS #/AREA URNS HPF: ABNORMAL /HPF
UROBILINOGEN UR QL STRIP: ABNORMAL
WBC UR QL AUTO: ABNORMAL /HPF

## 2019-05-22 PROCEDURE — 81001 URINALYSIS AUTO W/SCOPE: CPT | Performed by: FAMILY MEDICINE

## 2019-05-22 PROCEDURE — 87186 SC STD MICRODIL/AGAR DIL: CPT | Performed by: FAMILY MEDICINE

## 2019-05-22 PROCEDURE — 87086 URINE CULTURE/COLONY COUNT: CPT | Performed by: FAMILY MEDICINE

## 2019-05-23 RX ORDER — INSULIN GLARGINE 100 [IU]/ML
INJECTION, SOLUTION SUBCUTANEOUS
Qty: 5 PEN | Refills: 5 | Status: SHIPPED | OUTPATIENT
Start: 2019-05-23 | End: 2019-12-19 | Stop reason: SDUPTHER

## 2019-05-24 LAB — BACTERIA SPEC AEROBE CULT: ABNORMAL

## 2019-05-28 RX ORDER — DULOXETIN HYDROCHLORIDE 60 MG/1
CAPSULE, DELAYED RELEASE ORAL
Qty: 90 CAPSULE | Refills: 1 | Status: SHIPPED | OUTPATIENT
Start: 2019-05-28 | End: 2019-10-09 | Stop reason: SDUPTHER

## 2019-05-28 NOTE — PROGRESS NOTES
Please call the patient regarding her abnormal result.  Tell her the only antibiotic she is susceptible to is going to be tetracycline, send in doxycycline 100 mg twice daily for 10 days.  She may stop the other antibiotics that she is on

## 2019-05-29 RX ORDER — DOXYCYCLINE HYCLATE 100 MG/1
100 CAPSULE ORAL 2 TIMES DAILY
Qty: 20 CAPSULE | Refills: 0 | Status: SHIPPED | OUTPATIENT
Start: 2019-05-29 | End: 2019-06-26

## 2019-06-24 DIAGNOSIS — R30.0 DYSURIA: Primary | ICD-10-CM

## 2019-06-24 RX ORDER — IRBESARTAN 150 MG/1
150 TABLET ORAL NIGHTLY
Qty: 30 TABLET | Refills: 5 | Status: SHIPPED | OUTPATIENT
Start: 2019-06-24

## 2019-06-26 ENCOUNTER — OFFICE VISIT (OUTPATIENT)
Dept: FAMILY MEDICINE CLINIC | Facility: CLINIC | Age: 84
End: 2019-06-26

## 2019-06-26 VITALS
WEIGHT: 188 LBS | DIASTOLIC BLOOD PRESSURE: 72 MMHG | HEIGHT: 66 IN | SYSTOLIC BLOOD PRESSURE: 120 MMHG | BODY MASS INDEX: 30.22 KG/M2

## 2019-06-26 DIAGNOSIS — E11.42 DIABETIC PERIPHERAL NEUROPATHY (HCC): ICD-10-CM

## 2019-06-26 DIAGNOSIS — N39.0 URINARY TRACT INFECTION WITHOUT HEMATURIA, SITE UNSPECIFIED: Primary | ICD-10-CM

## 2019-06-26 DIAGNOSIS — E11.65 UNCONTROLLED TYPE 2 DIABETES MELLITUS WITH HYPERGLYCEMIA (HCC): ICD-10-CM

## 2019-06-26 LAB
BACTERIA UR QL AUTO: ABNORMAL /HPF
BILIRUB UR QL STRIP: NEGATIVE
CLARITY UR: ABNORMAL
COLOR UR: YELLOW
GLUCOSE UR STRIP-MCNC: ABNORMAL MG/DL
HGB UR QL STRIP.AUTO: ABNORMAL
HYALINE CASTS UR QL AUTO: ABNORMAL /LPF
KETONES UR QL STRIP: NEGATIVE
LEUKOCYTE ESTERASE UR QL STRIP.AUTO: ABNORMAL
NITRITE UR QL STRIP: POSITIVE
PH UR STRIP.AUTO: 6.5 [PH] (ref 5.5–8)
PROT UR QL STRIP: ABNORMAL
RBC # UR: ABNORMAL /HPF
REF LAB TEST METHOD: ABNORMAL
SP GR UR STRIP: 1.02 (ref 1–1.03)
SQUAMOUS #/AREA URNS HPF: ABNORMAL /HPF
UROBILINOGEN UR QL STRIP: ABNORMAL
WBC UR QL AUTO: ABNORMAL /HPF

## 2019-06-26 PROCEDURE — 87186 SC STD MICRODIL/AGAR DIL: CPT | Performed by: FAMILY MEDICINE

## 2019-06-26 PROCEDURE — 87077 CULTURE AEROBIC IDENTIFY: CPT | Performed by: FAMILY MEDICINE

## 2019-06-26 PROCEDURE — 99214 OFFICE O/P EST MOD 30 MIN: CPT | Performed by: FAMILY MEDICINE

## 2019-06-26 PROCEDURE — 81001 URINALYSIS AUTO W/SCOPE: CPT | Performed by: FAMILY MEDICINE

## 2019-06-26 PROCEDURE — 87086 URINE CULTURE/COLONY COUNT: CPT | Performed by: FAMILY MEDICINE

## 2019-06-26 RX ORDER — GABAPENTIN 300 MG/1
CAPSULE ORAL
Qty: 90 CAPSULE | Refills: 2 | Status: SHIPPED | OUTPATIENT
Start: 2019-06-26 | End: 2019-09-26 | Stop reason: SDUPTHER

## 2019-06-26 RX ORDER — HYDROCODONE BITARTRATE AND ACETAMINOPHEN 7.5; 325 MG/1; MG/1
1 TABLET ORAL
Qty: 60 TABLET | Refills: 0 | Status: SHIPPED | OUTPATIENT
Start: 2019-06-26

## 2019-06-26 RX ORDER — CLINDAMYCIN HYDROCHLORIDE 300 MG/1
300 CAPSULE ORAL 4 TIMES DAILY
Qty: 40 CAPSULE | Refills: 0 | Status: SHIPPED | OUTPATIENT
Start: 2019-06-26 | End: 2019-07-06

## 2019-06-26 NOTE — PROGRESS NOTES
Subjective   Nyla Piña is a 84 y.o. female with hypertension, diabetes, diabetic neuropathy who has had a lot of recent UTIs.  She is here today to refill gabapentin for the neuropathy and is having a lot of burning in the feet still.  She does not wish to go up on the dose though.  She would like to have a refill of her pain medicine to take intermittently when she needs it.  She does not take it every day.  She does have a prescription of lorazepam that she takes sometimes at night when she gets upset or nervous, since the death of her , but she understands not to take this in the pain medicine together.    She is had recurrent UTIs and her recent culture showed staph which was resistant to most everything.  She had most recently been on doxycycline as it showed some sensitivity to tetracycline.  Symptoms however returned when she finishes the medications.    History of Present Illness   Back Pain   This is a new problem. The current episode started 2 weeks ago. The problem occurs constantly. The problem is unchanged. The pain is present in the low back. The quality of the pain is described as shooting, stabbing and aching.  . The pain is at a severity of 8/10. The pain is severe. The pain is the same all the time. The symptoms are aggravated by standing, twisting, position, bending and sitting. Stiffness is present at night, all day and in the morning. Associated symptoms include leg pain and tingling. Pertinent negatives include no abdominal pain, bladder incontinence, bowel incontinence, chest pain, dysuria, fever, headaches, numbness, paresis, paresthesias, pelvic pain, perianal numbness, weakness or weight loss. Risk factors include sedentary lifestyle. Patient has tried analgesics, NSAIDs, muscle relaxant and heat for the symptoms. The treatment provided mild relief.     The following portions of the patient's history were reviewed and updated as appropriate: allergies, current medications,  past family history, past medical history, past social history, past surgical history and problem list.    Review of Systems   HENT: Negative.    Cardiovascular: Negative.    Gastrointestinal: Negative.    Genitourinary: Negative for flank pain, frequency, hematuria and urgency.   Musculoskeletal: Positive for gait problem.   Skin: Negative.    Allergic/Immunologic: Negative for immunocompromised state.   Neurological: Negative for syncope.   Hematological: Negative.    Psychiatric/Behavioral: Positive for sleep disturbance. Negative for agitation and dysphoric mood.   All other systems reviewed and are negative.    Objective   Physical Exam   Constitutional: She is oriented to person, place, and time. She appears well-developed and well-nourished.   HENT:   Head: Normocephalic and atraumatic.   Nose: Nose normal.   Mouth/Throat: Oropharynx is clear and moist.       Eyes: Conjunctivae and EOM are normal. Pupils are equal, round, and reactive to light.   Neck: Normal range of motion. Neck supple. No JVD present. No tracheal deviation present. No thyromegaly present.   Cardiovascular: Regular rhythm and intact distal pulses.   Murmur heard.  2/6 holosystolic murmur is noted today   Pulmonary/Chest: Effort normal and breath sounds normal. She has no wheezes.   Abdominal: Soft. Bowel sounds are normal. She exhibits no distension. There is no tenderness.   Musculoskeletal: She exhibits tenderness. She exhibits no edema.   Patient in a wheelchair, she is unable to ambulate today.  She has a lot of tenderness in the low back.  She also has difficulty moving both hips and pain with weightbearing   Lymphadenopathy:     She has no cervical adenopathy.   Neurological: She is alert and oriented to person, place, and time. Coordination normal.   Skin: Skin is warm and dry. No rash noted.       Psychiatric: She has a normal mood and affect.   Nursing note and vitals reviewed.  Contains abnormal data Urine Culture - Urine, Urine,  Clean Catch   Order: [id number] - Reflex for Order [id number]   Collected:  5/22/2019 15:30   Status:  Final result   Visible to patient:  No (Not Released)   Dx:  Dysuria   Specimen Information: Urine, Clean Catch        Urine Culture >100,000 CFU/mL Staphylococcus epidermidis Abnormal           Resulting Agency: [facility]   Susceptibility      Staphylococcus epidermidis     WU     Nitrofurantoin <=16 ug/ml Susceptible     Oxacillin 0.5 ug/ml Resistant     Tetracycline 2 ug/ml Susceptible     Vancomycin <=0.5 ug/ml Susceptible           Susceptibility Comments     Staphylococcus epidermidis   This isolate does not demonstrate inducible clindamycin resistance in vitro.                 Assessment/Plan   Nyla was seen today for follow-up.    Diagnoses and all orders for this visit:    Urinary tract infection without hematuria, site unspecified    Diabetic peripheral neuropathy (CMS/HCC)    Uncontrolled type 2 diabetes mellitus with hyperglycemia (CMS/HCC)    Other orders  -     gabapentin (NEURONTIN) 300 MG capsule; TAKE ONE CAPSULE BY MOUTH THREE TIMES A DAY  -     clindamycin (CLEOCIN) 300 MG capsule; Take 1 capsule by mouth 4 (Four) Times a Day for 10 days.    The patient has read and signed the [facility] Controlled Substance Contract.  I will continue to see patient for regular follow up appointments. Patient is well controlled on the medication.  ANGELITO has been reviewed by me and is updated every 3 months. The patient is aware of the potential for addiction and dependence.     Continue lorazepam for anxiety at night, understands not to take with pain meds.  She uses both only prn.    Continue gabapentin for diabetic peripheral neuropathy.  Continue to test blood sugars at least daily and when needed and follow-up with me in 3 months.    Prescribed clindamycin for the urinary tract infection but I want her to follow-up in 2 weeks given the chronicity of the infections that she is had and her persistent  symptoms.  While I did show resistant staph, it did not demonstrate resistance in vitro at least and hopefully the clindamycin will help.  If not may need to consider IV antibiotics        This document has been electronically signed by Chantel Long MD on June 26, 2019 3:16 PM

## 2019-06-27 RX ORDER — LORAZEPAM 1 MG/1
1 TABLET ORAL NIGHTLY PRN
Qty: 30 TABLET | Refills: 2 | Status: SHIPPED | OUTPATIENT
Start: 2019-06-27 | End: 2019-09-26 | Stop reason: SDUPTHER

## 2019-06-28 LAB — BACTERIA SPEC AEROBE CULT: ABNORMAL

## 2019-07-10 ENCOUNTER — LAB (OUTPATIENT)
Dept: LAB | Facility: OTHER | Age: 84
End: 2019-07-10

## 2019-07-10 ENCOUNTER — OFFICE VISIT (OUTPATIENT)
Dept: FAMILY MEDICINE CLINIC | Facility: CLINIC | Age: 84
End: 2019-07-10

## 2019-07-10 VITALS
DIASTOLIC BLOOD PRESSURE: 70 MMHG | SYSTOLIC BLOOD PRESSURE: 114 MMHG | WEIGHT: 188 LBS | BODY MASS INDEX: 30.22 KG/M2 | HEIGHT: 66 IN

## 2019-07-10 DIAGNOSIS — R30.0 DYSURIA: ICD-10-CM

## 2019-07-10 DIAGNOSIS — R30.0 DYSURIA: Primary | ICD-10-CM

## 2019-07-10 DIAGNOSIS — N39.0 CHRONIC UTI: Primary | ICD-10-CM

## 2019-07-10 LAB
BACTERIA UR QL AUTO: ABNORMAL /HPF
BILIRUB UR QL STRIP: NEGATIVE
CLARITY UR: ABNORMAL
COLOR UR: YELLOW
GLUCOSE UR STRIP-MCNC: NEGATIVE MG/DL
HGB UR QL STRIP.AUTO: ABNORMAL
HYALINE CASTS UR QL AUTO: ABNORMAL /LPF
KETONES UR QL STRIP: NEGATIVE
LEUKOCYTE ESTERASE UR QL STRIP.AUTO: ABNORMAL
NITRITE UR QL STRIP: POSITIVE
PH UR STRIP.AUTO: 7.5 [PH] (ref 5.5–8)
PROT UR QL STRIP: ABNORMAL
RBC # UR: ABNORMAL /HPF
REF LAB TEST METHOD: ABNORMAL
SP GR UR STRIP: 1.02 (ref 1–1.03)
SQUAMOUS #/AREA URNS HPF: ABNORMAL /HPF
UROBILINOGEN UR QL STRIP: ABNORMAL
WBC UR QL AUTO: ABNORMAL /HPF

## 2019-07-10 PROCEDURE — 99214 OFFICE O/P EST MOD 30 MIN: CPT | Performed by: FAMILY MEDICINE

## 2019-07-10 PROCEDURE — 87086 URINE CULTURE/COLONY COUNT: CPT | Performed by: FAMILY MEDICINE

## 2019-07-10 PROCEDURE — 81001 URINALYSIS AUTO W/SCOPE: CPT | Performed by: FAMILY MEDICINE

## 2019-07-10 PROCEDURE — 87186 SC STD MICRODIL/AGAR DIL: CPT | Performed by: FAMILY MEDICINE

## 2019-07-10 RX ORDER — SULFAMETHOXAZOLE AND TRIMETHOPRIM 800; 160 MG/1; MG/1
1 TABLET ORAL 2 TIMES DAILY
Qty: 20 TABLET | Refills: 0 | Status: SHIPPED | OUTPATIENT
Start: 2019-07-10 | End: 2019-09-19

## 2019-07-10 RX ORDER — NITROFURANTOIN 25; 75 MG/1; MG/1
100 CAPSULE ORAL 2 TIMES DAILY
Qty: 20 CAPSULE | Refills: 0 | Status: SHIPPED | OUTPATIENT
Start: 2019-07-10 | End: 2019-09-19

## 2019-07-10 NOTE — PROGRESS NOTES
Subjective   Nyla Piña is a 84 y.o. female with hypertension, diabetes, diabetic neuropathy who has had a lot of recent UTIs.  She is symptomatic again and a urinalysis today does confirm infection.  She said a lot of resistance but her last culture and sensitivity showed mild sensitivity to both Bactrim and Macrodantin.  Most recently she was on clindamycin which did not seem to help.    Difficulty Urinating   This is a recurrent problem. The current episode started more than 1 month ago. The problem occurs constantly. The problem has been unchanged. Associated symptoms include abdominal pain, anorexia, arthralgias, fatigue, myalgias, nausea and weakness. Nothing aggravates the symptoms. Treatments tried: Multiple antibiotics. The treatment provided no relief.      The following portions of the patient's history were reviewed and updated as appropriate: allergies, current medications, past family history, past medical history, past social history, past surgical history and problem list.    Review of Systems   Constitutional: Positive for fatigue.   HENT: Negative.    Cardiovascular: Negative.    Gastrointestinal: Positive for abdominal pain, anorexia and nausea.   Genitourinary: Positive for difficulty urinating. Negative for flank pain, frequency, hematuria and urgency.   Musculoskeletal: Positive for arthralgias, gait problem and myalgias.   Skin: Negative.    Allergic/Immunologic: Negative for immunocompromised state.   Neurological: Positive for weakness. Negative for syncope.   Hematological: Negative.    Psychiatric/Behavioral: Positive for sleep disturbance. Negative for agitation and dysphoric mood.   All other systems reviewed and are negative.    Objective   Objective      Physical Exam   Constitutional: She is oriented to person, place, and time. She appears well-developed and well-nourished.   HENT:   Head: Normocephalic and atraumatic.   Nose: Nose normal.   Mouth/Throat: Oropharynx is clear  and moist.       Eyes: Conjunctivae and EOM are normal. Pupils are equal, round, and reactive to light.   Neck: Normal range of motion. Neck supple. No JVD present. No tracheal deviation present. No thyromegaly present.   Cardiovascular: Regular rhythm and intact distal pulses.   Murmur heard.  2/6 holosystolic murmur is noted today   Pulmonary/Chest: Effort normal and breath sounds normal. She has no wheezes.   Abdominal: Soft. Bowel sounds are normal. She exhibits no distension. There is no tenderness.   Musculoskeletal: She exhibits tenderness. She exhibits no edema.   Lymphadenopathy:     She has no cervical adenopathy.   Neurological: She is alert and oriented to person, place, and time. Coordination normal.   Skin: Skin is warm and dry. No rash noted.       Psychiatric: She has a normal mood and affect        Assessment/Plan   Nyla was seen today for follow-up.    Diagnoses and all orders for this visit:    Chronic UTI    Other orders  -     sulfamethoxazole-trimethoprim (BACTRIM DS) 800-160 MG per tablet; Take 1 tablet by mouth 2 (Two) Times a Day.  -     nitrofurantoin, macrocrystal-monohydrate, (MACROBID) 100 MG capsule; Take 1 capsule by mouth 2 (Two) Times a Day.    We will try to treat with both Bactrim and Macrobid and recheck with me in 2 weeks.  Contact me sooner for increasing symptoms or systemic symptoms or go to ER.  Patient understands directions.  If not improving the only other alternatives would be IV medications we will may need to see about inpatient treatment.        This document has been electronically signed by Chantel Long MD on July 10, 2019 3:25 PM

## 2019-07-12 LAB — BACTERIA SPEC AEROBE CULT: ABNORMAL

## 2019-08-06 ENCOUNTER — LAB (OUTPATIENT)
Dept: LAB | Facility: OTHER | Age: 84
End: 2019-08-06

## 2019-08-06 DIAGNOSIS — E11.42 DIABETIC POLYNEUROPATHY ASSOCIATED WITH TYPE 2 DIABETES MELLITUS (HCC): ICD-10-CM

## 2019-08-06 DIAGNOSIS — E06.3 HASHIMOTO'S THYROIDITIS: ICD-10-CM

## 2019-08-06 DIAGNOSIS — E55.9 VITAMIN D DEFICIENCY: ICD-10-CM

## 2019-08-06 DIAGNOSIS — Z79.4 CONTROLLED TYPE 2 DIABETES MELLITUS WITHOUT COMPLICATION, WITH LONG-TERM CURRENT USE OF INSULIN (HCC): ICD-10-CM

## 2019-08-06 DIAGNOSIS — E11.9 CONTROLLED TYPE 2 DIABETES MELLITUS WITHOUT COMPLICATION, WITH LONG-TERM CURRENT USE OF INSULIN (HCC): ICD-10-CM

## 2019-08-06 LAB
25(OH)D3 SERPL-MCNC: 42.3 NG/ML (ref 30–100)
ALBUMIN SERPL-MCNC: 4.1 G/DL (ref 3.5–5)
ALBUMIN/GLOB SERPL: 1.3 G/DL (ref 1.1–1.8)
ALP SERPL-CCNC: 69 U/L (ref 38–126)
ALT SERPL W P-5'-P-CCNC: 15 U/L
ANION GAP SERPL CALCULATED.3IONS-SCNC: 11 MMOL/L (ref 5–15)
AST SERPL-CCNC: 19 U/L (ref 14–36)
BASOPHILS # BLD AUTO: 0.04 10*3/MM3 (ref 0–0.2)
BASOPHILS NFR BLD AUTO: 0.5 % (ref 0–1.5)
BILIRUB SERPL-MCNC: 0.5 MG/DL (ref 0.2–1.3)
BUN BLD-MCNC: 14 MG/DL (ref 7–23)
BUN/CREAT SERPL: 22.2 (ref 7–25)
CALCIUM SPEC-SCNC: 9.3 MG/DL (ref 8.4–10.2)
CHLORIDE SERPL-SCNC: 104 MMOL/L (ref 101–112)
CHOLEST SERPL-MCNC: 232 MG/DL (ref 150–200)
CO2 SERPL-SCNC: 25 MMOL/L (ref 22–30)
CREAT BLD-MCNC: 0.63 MG/DL (ref 0.52–1.04)
DEPRECATED RDW RBC AUTO: 44.3 FL (ref 37–54)
EOSINOPHIL # BLD AUTO: 0.22 10*3/MM3 (ref 0–0.4)
EOSINOPHIL NFR BLD AUTO: 2.7 % (ref 0.3–6.2)
ERYTHROCYTE [DISTWIDTH] IN BLOOD BY AUTOMATED COUNT: 13.6 % (ref 12.3–15.4)
GFR SERPL CREATININE-BSD FRML MDRD: 90 ML/MIN/1.73 (ref 39–90)
GLOBULIN UR ELPH-MCNC: 3.2 GM/DL (ref 2.3–3.5)
GLUCOSE BLD-MCNC: 162 MG/DL (ref 70–99)
HBA1C MFR BLD: 6.9 % (ref 4.8–5.6)
HCT VFR BLD AUTO: 38.1 % (ref 34–46.6)
HDLC SERPL-MCNC: 57 MG/DL (ref 40–59)
HGB BLD-MCNC: 12.8 G/DL (ref 12–15.9)
LDLC SERPL CALC-MCNC: 143 MG/DL
LDLC/HDLC SERPL: 2.52 {RATIO} (ref 0–3.22)
LYMPHOCYTES # BLD AUTO: 3.49 10*3/MM3 (ref 0.7–3.1)
LYMPHOCYTES NFR BLD AUTO: 43.1 % (ref 19.6–45.3)
MCH RBC QN AUTO: 30.8 PG (ref 26.6–33)
MCHC RBC AUTO-ENTMCNC: 33.6 G/DL (ref 31.5–35.7)
MCV RBC AUTO: 91.8 FL (ref 79–97)
MONOCYTES # BLD AUTO: 0.96 10*3/MM3 (ref 0.1–0.9)
MONOCYTES NFR BLD AUTO: 11.9 % (ref 5–12)
NEUTROPHILS # BLD AUTO: 3.39 10*3/MM3 (ref 1.7–7)
NEUTROPHILS NFR BLD AUTO: 41.8 % (ref 42.7–76)
PLATELET # BLD AUTO: 326 10*3/MM3 (ref 140–450)
PMV BLD AUTO: 8.6 FL (ref 6–12)
POTASSIUM BLD-SCNC: 4.3 MMOL/L (ref 3.4–5)
PROT SERPL-MCNC: 7.3 G/DL (ref 6.3–8.6)
RBC # BLD AUTO: 4.15 10*6/MM3 (ref 3.77–5.28)
SODIUM BLD-SCNC: 140 MMOL/L (ref 137–145)
TRIGL SERPL-MCNC: 158 MG/DL
TSH SERPL DL<=0.05 MIU/L-ACNC: 1.77 MIU/ML (ref 0.27–4.2)
VIT B12 BLD-MCNC: 692 PG/ML (ref 211–946)
VLDLC SERPL-MCNC: 31.6 MG/DL
WBC NRBC COR # BLD: 8.1 10*3/MM3 (ref 3.4–10.8)

## 2019-08-06 PROCEDURE — 82306 VITAMIN D 25 HYDROXY: CPT | Performed by: NURSE PRACTITIONER

## 2019-08-06 PROCEDURE — 80053 COMPREHEN METABOLIC PANEL: CPT | Performed by: NURSE PRACTITIONER

## 2019-08-06 PROCEDURE — 80061 LIPID PANEL: CPT | Performed by: NURSE PRACTITIONER

## 2019-08-06 PROCEDURE — 36415 COLL VENOUS BLD VENIPUNCTURE: CPT | Performed by: NURSE PRACTITIONER

## 2019-08-06 PROCEDURE — 84443 ASSAY THYROID STIM HORMONE: CPT | Performed by: NURSE PRACTITIONER

## 2019-08-06 PROCEDURE — 82607 VITAMIN B-12: CPT | Performed by: NURSE PRACTITIONER

## 2019-08-06 PROCEDURE — 83036 HEMOGLOBIN GLYCOSYLATED A1C: CPT | Performed by: NURSE PRACTITIONER

## 2019-08-06 PROCEDURE — 85025 COMPLETE CBC W/AUTO DIFF WBC: CPT | Performed by: NURSE PRACTITIONER

## 2019-08-09 ENCOUNTER — OFFICE VISIT (OUTPATIENT)
Dept: ENDOCRINOLOGY | Facility: CLINIC | Age: 84
End: 2019-08-09

## 2019-08-09 VITALS
BODY MASS INDEX: 28.87 KG/M2 | HEIGHT: 66 IN | OXYGEN SATURATION: 97 % | SYSTOLIC BLOOD PRESSURE: 122 MMHG | HEART RATE: 56 BPM | DIASTOLIC BLOOD PRESSURE: 76 MMHG | WEIGHT: 179.6 LBS

## 2019-08-09 DIAGNOSIS — E78.2 MIXED HYPERLIPIDEMIA: ICD-10-CM

## 2019-08-09 DIAGNOSIS — Z79.4 CONTROLLED TYPE 2 DIABETES MELLITUS WITHOUT COMPLICATION, WITH LONG-TERM CURRENT USE OF INSULIN (HCC): Primary | ICD-10-CM

## 2019-08-09 DIAGNOSIS — E55.9 VITAMIN D DEFICIENCY: ICD-10-CM

## 2019-08-09 DIAGNOSIS — E11.9 CONTROLLED TYPE 2 DIABETES MELLITUS WITHOUT COMPLICATION, WITH LONG-TERM CURRENT USE OF INSULIN (HCC): Primary | ICD-10-CM

## 2019-08-09 DIAGNOSIS — E06.3 HASHIMOTO'S THYROIDITIS: ICD-10-CM

## 2019-08-09 PROCEDURE — 99214 OFFICE O/P EST MOD 30 MIN: CPT | Performed by: NURSE PRACTITIONER

## 2019-08-09 NOTE — PROGRESS NOTES
Subjective    Nyla Piña is a 84 y.o. female. she is here today for follow-up.    History of Present Illness         Duration/Timing:Diabetes mellitus type 2, Age at onset of diabetes : 58 years, Onset of symptoms gradual         Timing constant      quality near control      Severity Complications      Patient fell and broke some vertebrae         Severity (Complications/Hospitalizations)  Secondary Macrovascular Complications: No CAD, No CVA, No PAD  Secondary Microvascular Complications: No Diabetic Nephropathy, Microalbuminuria, No proteinuria, No Diabetic Retinopathy, Diabetic Neuropathy     Context  Diabetes Regimen: Insulin, oral medication,                 Lab Results   Component Value Date    HGBA1C 6.90 (H) 08/06/2019                   Blood Glucose Readings     Checks 4 times daily    Using fili personal system           This document has been electronically signed by MARTHA Gonzalez on October 8, 2019 2:15 PM         Several at goal      Lowest 85      Diet      Low carb      Exercise:Does not exercise     Associated Signs/Symptoms  Hyperglycemic Symptoms:Polyruria, polydipsia, polyphagia, No blurred vision, No weight gain  Hypoglycemic Episodes:No documented hypoglycemia                 The following portions of the patient's history were reviewed and updated as appropriate:   Past Medical History:   Diagnosis Date   • Acute bronchitis    • Acute sinusitis    • Acute upper respiratory infection    • Anxiety    • Cellulitis of lower leg    • Chronic urinary tract infection    • Cough    • Death of     • Diabetic asymmetric polyneuropathy (CMS/HCC)    • Diabetic peripheral neuropathy (CMS/HCC)    • Diabetic polyneuropathy (CMS/HCC)    • Disease of nail    • Dorsalgia    • Flank pain    • Hashimoto's thyroiditis    • History of echocardiogram 02/19/2013   • Hypertensive disorder    • Insomnia    • Mixed hyperlipidemia    • Non-healing surgical wound    • Osteoarthritis of  multiple joints    • Peripheral vascular disease (CMS/HCC)    • Seasonal allergic rhinitis    • Type II diabetes mellitus, uncontrolled (CMS/HCC)    • Upper respiratory infection    • Urinary tract infectious disease    • Urinary tract infectious disease    • Vitamin D deficiency      Past Surgical History:   Procedure Laterality Date   • EXCISION MASS LEG Left 2013    Excision of soft tissue mass of left leg   • HYSTERECTOMY      Age 27   • STEROID INJECTION  2016    Depo Medrol (Methylprednisone) 80mg (Acute upper respiratory infection, unspecified)      History reviewed. No pertinent family history.  OB History      Para Term  AB Living    2 2 2          SAB TAB Ectopic Molar Multiple Live Births                       Current Outpatient Medications   Medication Sig Dispense Refill   • albuterol (VENTOLIN HFA) 108 (90 Base) MCG/ACT inhaler Inhale 2 puffs Every 6 (Six) Hours As Needed for Wheezing or Shortness of Air. 1 inhaler 3   • amLODIPine (NORVASC) 10 MG tablet TAKE 1 TABLET BY MOUTH DAILY. 30 tablet 4   • aspirin 81 MG tablet Take 81 mg by mouth daily.     • B-D ULTRAFINE III SHORT PEN 31G X 8 MM misc Use and discard 1 pen needle 4 times daily. 200 each 5   • Calcium Carbonate (CALCIUM 600 PO) Take 2 tablets by mouth daily.     • diclofenac (VOLTAREN) 1 % gel gel Apply 4 g topically 4 (Four) Times a Day. 100 g 11   • DULoxetine (CYMBALTA) 60 MG capsule TAKE 1 CAPSULE DAILY 90 capsule 1   • ergocalciferol (ERGOCALCIFEROL) 45624 units capsule Take 1 capsule by mouth 1 (One) Time Per Week. 12 capsule 11   • famciclovir (FAMVIR) 250 MG tablet Take 1 tablet by mouth 3 (Three) Times a Day. 15 tablet 0   • fluticasone (FLONASE) 50 MCG/ACT nasal spray SPRAY 1 SPRAY INTO EACH NOSTRIL BIDM 16 g 5   • gabapentin (NEURONTIN) 300 MG capsule TAKE ONE CAPSULE BY MOUTH THREE TIMES A DAY 90 capsule 2   • HYDROcodone-acetaminophen (NORCO) 7.5-325 MG per tablet Take 1 tablet by mouth 2 (Two) Times a  "Day. 60 tablet 0   • insulin aspart (novoLOG FLEXPEN) 100 UNIT/ML solution pen-injector sc pen Take 15 units TID before each meal 15 pen 3   • Insulin Glargine (BASAGLAR KWIKPEN) 100 UNIT/ML injection pen Inject 15 Units under the skin Every Night. 5 pen 5   • irbesartan (AVAPRO) 150 MG tablet TAKE 1 TABLET BY MOUTH EVERY NIGHT. 30 tablet 5   • levothyroxine (SYNTHROID, LEVOTHROID) 100 MCG tablet TAKE 1 TABLET DAILY 90 tablet 1   • LORazepam (ATIVAN) 1 MG tablet Take 1 tablet by mouth At Night As Needed for Anxiety or Sleep. 30 tablet 2   • metFORMIN (GLUCOPHAGE) 850 MG tablet TAKE 1 TABLET TWICE A DAY  WITH MEALS 180 tablet 1   • metoprolol succinate XL (TOPROL-XL) 25 MG 24 hr tablet Take 1 tablet by mouth Daily. 90 tablet 3   • montelukast (SINGULAIR) 10 MG tablet   6   • Needle, Disp, 31G X 5/16\" misc 1 each 4 (Four) Times a Day. Pen Needle; 400 each 3   • nitrofurantoin, macrocrystal-monohydrate, (MACROBID) 100 MG capsule Take 1 capsule by mouth 2 (Two) Times a Day. 20 capsule 0   • sulfamethoxazole-trimethoprim (BACTRIM DS) 800-160 MG per tablet Take 1 tablet by mouth 2 (Two) Times a Day. 20 tablet 0   • TRUE METRIX BLOOD GLUCOSE TEST test strip USE TO TEST FOUR TIMES DAILY 150 each 11   • TRUEPLUS LANCETS 28G misc TEST FOUR TIMES A  each 11     Current Facility-Administered Medications   Medication Dose Route Frequency Provider Last Rate Last Dose   • cyanocobalamin injection 1,000 mcg  1,000 mcg Intramuscular Q28 Days Chantel Long MD   1,000 mcg at 03/07/19 1145     Allergies   Allergen Reactions   • Latex Itching   • Penicillins Swelling and Rash     Social History     Socioeconomic History   • Marital status:      Spouse name: Not on file   • Number of children: Not on file   • Years of education: Not on file   • Highest education level: Not on file   Tobacco Use   • Smoking status: Never Smoker   • Smokeless tobacco: Never Used   Substance and Sexual Activity   • Alcohol use: No   • Drug " "use: No   • Sexual activity: Defer       Review of Systems  Review of Systems   Constitutional: Negative for activity change, appetite change, diaphoresis and fatigue.   HENT: Negative for facial swelling, sneezing, sore throat, tinnitus, trouble swallowing and voice change.    Eyes: Negative for photophobia, pain, discharge, redness, itching and visual disturbance.   Respiratory: Negative for apnea, cough, choking, chest tightness and shortness of breath.    Cardiovascular: Negative for chest pain, palpitations and leg swelling.   Gastrointestinal: Negative for abdominal distention, abdominal pain, constipation, diarrhea, nausea and vomiting.   Endocrine: Negative for cold intolerance, heat intolerance, polydipsia, polyphagia and polyuria.   Genitourinary: Negative for difficulty urinating, dysuria, frequency, hematuria and urgency.   Musculoskeletal: Negative for arthralgias, back pain, gait problem, joint swelling, myalgias, neck pain and neck stiffness.   Skin: Negative for color change, pallor, rash and wound.   Neurological: Negative for dizziness, tremors, weakness, light-headedness, numbness and headaches.   Hematological: Negative for adenopathy. Does not bruise/bleed easily.   Psychiatric/Behavioral: Negative for behavioral problems, confusion and sleep disturbance.        Objective    /76   Pulse 56   Ht 167.6 cm (66\")   Wt 81.5 kg (179 lb 9.6 oz)   SpO2 97%   BMI 28.99 kg/m²   Physical Exam   Constitutional: She is oriented to person, place, and time. She appears well-developed and well-nourished. No distress.   HENT:   Head: Normocephalic and atraumatic.   Right Ear: External ear normal.   Left Ear: External ear normal.   Nose: Nose normal.   Eyes: Conjunctivae and EOM are normal. Pupils are equal, round, and reactive to light.   Neck: Normal range of motion. Neck supple. No tracheal deviation present. No thyromegaly present.   Cardiovascular: Normal rate, regular rhythm and normal heart " sounds.   No murmur heard.  Pulmonary/Chest: Effort normal and breath sounds normal. No respiratory distress. She has no wheezes.   Abdominal: Soft. Bowel sounds are normal. There is no tenderness. There is no rebound and no guarding.   Musculoskeletal: Normal range of motion. She exhibits no edema, tenderness or deformity.   In back brace, using a cane   Neurological: She is alert and oriented to person, place, and time. No cranial nerve deficit.   Skin: Skin is warm and dry. No rash noted.   Psychiatric: She has a normal mood and affect. Her behavior is normal. Judgment and thought content normal.       Lab Review  Glucose (mg/dL)   Date Value   08/06/2019 162 (H)   02/05/2019 132 (H)   07/18/2018 164 (H)     Sodium (mmol/L)   Date Value   08/06/2019 140   02/05/2019 138   07/18/2018 136 (L)     Potassium (mmol/L)   Date Value   08/06/2019 4.3   02/05/2019 4.2   07/18/2018 4.4     Chloride (mmol/L)   Date Value   08/06/2019 104   02/05/2019 100   07/18/2018 99     CO2 (mmol/L)   Date Value   08/06/2019 25.0   02/05/2019 27.0   07/18/2018 28.0     BUN (mg/dL)   Date Value   08/06/2019 14   02/05/2019 15   07/18/2018 16     Creatinine (mg/dL)   Date Value   08/06/2019 0.63   02/05/2019 0.72   07/18/2018 0.72     Hemoglobin A1C (%)   Date Value   08/06/2019 6.90 (H)   02/05/2019 7.2 (H)   07/18/2018 6.9 (H)   01/24/2018 7.1 (H)     Triglycerides (mg/dL)   Date Value   08/06/2019 158 (H)   02/05/2019 151 (H)   01/24/2018 92     LDL Cholesterol  (mg/dL)   Date Value   08/06/2019 143 (H)   02/05/2019 103 (H)   01/24/2018 90     LDLCALC ELECT (mg/dl)   Date Value   07/20/2016 103       Assessment/Plan      1. Controlled type 2 diabetes mellitus without complication, with long-term current use of insulin (CMS/HCC)    2. Hashimoto's thyroiditis    3. Mixed hyperlipidemia    4. Vitamin D deficiency    .    Medications prescribed:  Outpatient Encounter Medications as of 8/9/2019   Medication Sig Dispense Refill   • albuterol  "(VENTOLIN HFA) 108 (90 Base) MCG/ACT inhaler Inhale 2 puffs Every 6 (Six) Hours As Needed for Wheezing or Shortness of Air. 1 inhaler 3   • amLODIPine (NORVASC) 10 MG tablet TAKE 1 TABLET BY MOUTH DAILY. 30 tablet 4   • aspirin 81 MG tablet Take 81 mg by mouth daily.     • B-D ULTRAFINE III SHORT PEN 31G X 8 MM misc Use and discard 1 pen needle 4 times daily. 200 each 5   • Calcium Carbonate (CALCIUM 600 PO) Take 2 tablets by mouth daily.     • diclofenac (VOLTAREN) 1 % gel gel Apply 4 g topically 4 (Four) Times a Day. 100 g 11   • DULoxetine (CYMBALTA) 60 MG capsule TAKE 1 CAPSULE DAILY 90 capsule 1   • ergocalciferol (ERGOCALCIFEROL) 77619 units capsule Take 1 capsule by mouth 1 (One) Time Per Week. 12 capsule 11   • famciclovir (FAMVIR) 250 MG tablet Take 1 tablet by mouth 3 (Three) Times a Day. 15 tablet 0   • fluticasone (FLONASE) 50 MCG/ACT nasal spray SPRAY 1 SPRAY INTO EACH NOSTRIL BIDM 16 g 5   • gabapentin (NEURONTIN) 300 MG capsule TAKE ONE CAPSULE BY MOUTH THREE TIMES A DAY 90 capsule 2   • HYDROcodone-acetaminophen (NORCO) 7.5-325 MG per tablet Take 1 tablet by mouth 2 (Two) Times a Day. 60 tablet 0   • insulin aspart (novoLOG FLEXPEN) 100 UNIT/ML solution pen-injector sc pen Take 15 units TID before each meal 15 pen 3   • Insulin Glargine (BASAGLAR KWIKPEN) 100 UNIT/ML injection pen Inject 15 Units under the skin Every Night. 5 pen 5   • irbesartan (AVAPRO) 150 MG tablet TAKE 1 TABLET BY MOUTH EVERY NIGHT. 30 tablet 5   • levothyroxine (SYNTHROID, LEVOTHROID) 100 MCG tablet TAKE 1 TABLET DAILY 90 tablet 1   • LORazepam (ATIVAN) 1 MG tablet Take 1 tablet by mouth At Night As Needed for Anxiety or Sleep. 30 tablet 2   • metFORMIN (GLUCOPHAGE) 850 MG tablet TAKE 1 TABLET TWICE A DAY  WITH MEALS 180 tablet 1   • metoprolol succinate XL (TOPROL-XL) 25 MG 24 hr tablet Take 1 tablet by mouth Daily. 90 tablet 3   • montelukast (SINGULAIR) 10 MG tablet   6   • Needle, Disp, 31G X 5/16\" misc 1 each 4 (Four) " Times a Day. Pen Needle; 400 each 3   • nitrofurantoin, macrocrystal-monohydrate, (MACROBID) 100 MG capsule Take 1 capsule by mouth 2 (Two) Times a Day. 20 capsule 0   • sulfamethoxazole-trimethoprim (BACTRIM DS) 800-160 MG per tablet Take 1 tablet by mouth 2 (Two) Times a Day. 20 tablet 0   • TRUE METRIX BLOOD GLUCOSE TEST test strip USE TO TEST FOUR TIMES DAILY 150 each 11   • TRUEPLUS LANCETS 28G misc TEST FOUR TIMES A  each 11     Facility-Administered Encounter Medications as of 8/9/2019   Medication Dose Route Frequency Provider Last Rate Last Dose   • cyanocobalamin injection 1,000 mcg  1,000 mcg Intramuscular Q28 Days Chantel Long MD   1,000 mcg at 03/07/19 1145       Orders placed during this encounter include:  Orders Placed This Encounter   Procedures   • Comprehensive Metabolic Panel   • Hemoglobin A1c   • Protein / Creatinine Ratio, Urine - Urine, Clean Catch   • Microalbumin / Creatinine Urine Ratio - Urine, Clean Catch   • Vitamin D 25 Hydroxy   • Vitamin B12   • TSH   • CBC & Differential     Order Specific Question:   Manual Differential     Answer:   No     Glycemic Management     Diabetes Mellitus type 2               Lab Results   Component Value Date    HGBA1C 6.90 (H) 08/06/2019                Novolog mix stopped   Januvia stopped it due to headaches  Glimepiride stopped caused higher sugars         Metformin 850 mg one tablet po BID         Basaglar 15 units      Novolog for meals      Breakfast -- 6     Lunch -- 7     Supper -- 8        Patient use fili personal system    Brought to office to be reviewed    No changes in regimen          This document has been electronically signed by MARTHA Gonzalez on October 8, 2019 2:16 PM                    Lipid Management      Simvastatin 20 mg one at bedtime      Lipid not fasting           Component      Latest Ref Rng & Units 8/6/2019             Total Cholesterol      150 - 200 mg/dL 232 (H)   Triglycerides      <=150 mg/dL  158 (H)   HDL Cholesterol      40 - 59 mg/dL 57   LDL Cholesterol      <=100 mg/dL 143 (H)   VLDL Cholesterol      mg/dL 31.6   LDL/HDL Ratio      0.00 - 3.22 2.52        Blood Pressure Management      Lisinopril 20 mg daily-- stopped     amlodipine 5 mg daily      Valsartan -- on recall            Microvascular Complication Monitoring      Cymbalta 60 mg one daily      Gabapentin 300 mg one TID        hydrocodone-acetaminophen 7.5- 500 mg 2 times daily      3- 15 Microalbuminuria            Bone Health      h o vitamin d def      vitamin d 50,000 units weekly           Component      Latest Ref Rng & Units 8/6/2019   25 Hydroxy, Vitamin D      30.0 - 100.0 ng/ml 42.3        Immunization: influenza vaccine Oct. 2018 , shingles vaccine Oct. 2016         Other Diabetes Related Aspects         Hypothyroidism           Levothyroxine 100 mcg one daily Monday through Friday and 1/2 tablet on Saturday and none on Sunday     Lab Results   Component Value Date    TSH 1.770 08/06/2019                  4. Follow-up: Return in about 6 months (around 2/9/2020) for Recheck.

## 2019-08-20 ENCOUNTER — OFFICE VISIT (OUTPATIENT)
Dept: CARDIOLOGY | Facility: CLINIC | Age: 84
End: 2019-08-20

## 2019-08-20 VITALS
DIASTOLIC BLOOD PRESSURE: 72 MMHG | WEIGHT: 178 LBS | SYSTOLIC BLOOD PRESSURE: 114 MMHG | HEART RATE: 77 BPM | HEIGHT: 66 IN | BODY MASS INDEX: 28.61 KG/M2 | OXYGEN SATURATION: 99 %

## 2019-08-20 DIAGNOSIS — Z79.4 CONTROLLED TYPE 2 DIABETES MELLITUS WITHOUT COMPLICATION, WITH LONG-TERM CURRENT USE OF INSULIN (HCC): ICD-10-CM

## 2019-08-20 DIAGNOSIS — I10 ESSENTIAL HYPERTENSION: Primary | ICD-10-CM

## 2019-08-20 DIAGNOSIS — I73.9 PERIPHERAL VASCULAR DISEASE (HCC): ICD-10-CM

## 2019-08-20 DIAGNOSIS — E11.9 CONTROLLED TYPE 2 DIABETES MELLITUS WITHOUT COMPLICATION, WITH LONG-TERM CURRENT USE OF INSULIN (HCC): ICD-10-CM

## 2019-08-20 DIAGNOSIS — R00.0 TACHYCARDIA: ICD-10-CM

## 2019-08-20 DIAGNOSIS — E11.65 UNCONTROLLED TYPE 2 DIABETES MELLITUS WITH HYPERGLYCEMIA (HCC): ICD-10-CM

## 2019-08-20 DIAGNOSIS — E78.2 MIXED HYPERLIPIDEMIA: ICD-10-CM

## 2019-08-20 PROCEDURE — 99213 OFFICE O/P EST LOW 20 MIN: CPT | Performed by: INTERNAL MEDICINE

## 2019-08-20 NOTE — PROGRESS NOTES
Nyla Piña  84 y.o. female    08/20/2019  1. Essential hypertension    2. Controlled type 2 diabetes mellitus without complication, with long-term current use of insulin (CMS/[facility])    3. Peripheral vascular disease (CMS/[facility])    4. Tachycardia    5. Uncontrolled type 2 diabetes mellitus with hyperglycemia (CMS/[facility])    6. Mixed hyperlipidemia        History of Present Illness:    Body mass index is 28.73 kg/m². BMI is above normal parameters. Recommendations include: exercise counseling, nutrition counseling and referral to primary care.        82 years old patient physically active with long-standing history of diabetes on insulin regimen, hypothyroidism on hormone replacement, hyperlipidemia, chronic back pain, peripheral neuropathy who referred for evaluations of cardiac murmur, increasing fatigability, mild dyspnea on exertion and noted to have sinus tachycardia on the EKG.  The patient denies orthopnea PND or chest pain.  Denies syncope or near syncopal episode.  Had history of back pain but able to take care of herself very well.  No Syncope or near syncopal episode reported.  Patient underwent Holter monitor echocardiographic study.  Holter monitor reported to have nonsustained wide complex tachycardia 8 week at rate of approximately 1 90 bpm and nonsustained atrial tachycardia longest was 14 beat at rate approximately 1 40 bpm.    TSH level within normal range and lipid profile also within the recommended range.  No fever cough which was reported.  No dysuria or hematuria or bright red blood per rectum reported.  No intermittent claudication reported.     1/2018  Total Cholesterol 150 - 200 mg/dL 183    Triglycerides 35 - 160 mg/dL 92    HDL Cholesterol 35 - 100 mg/dL 75    LDL Cholesterol  mg/dL 90    VLDL Cholesterol mg/dL 18.4    LDL/HDL Ratio   1.19       TSH 0.460 - 4.680 mIU/mL 1.130             STRESS TEST 9/2018  · Findings consistent with an equivocal ECG stress test.  · Myocardial perfusion  imaging indicates a normal myocardial perfusion study with no evidence of ischemia.  · Impressions are consistent with a low risk study.  · Left ventricular ejection fraction is normal (Calculated EF = 70%).        Abnormal ZIO PATCH 4/2018  Predominant rhythm is sinus with evidence of prolonged WA interval and nonsustained wide QRS comlex tachycardia 8 at rate of 190 to 210 bpm ..  There is nonsustained atrial tachycardia longest 14 beat at a rate approximate 140 bpm.  There are frequent premature supraventricular ventricle ectopic beats representing 3.5% of total atrial activity and a rare premature supraventricular couplet.  There are isolated premature ventricular complex proximate 3500 and with some bigeminy pattern.  No evidence significant bradyarrhythmia or pauses noted        4/2018     Left Ventricle Left ventricular systolic function is normal. Estimated EF was in agreement with the calculated EF. Estimated EF appears to be in the range of 56 - 60%. Estimated EF = 60%. Normal left ventricular cavity size noted. All left ventricular wall segments contract normally. Left ventricular wall thickness is consistent with mild concentric hypertrophy. Left ventricular diastolic dysfunction is noted (grade I) consistent with impaired relaxation.      Right Ventricle Normal right ventricular cavity size and systolic function noted.      Left Atrium Normal left atrial size and volume noted.      Right Atrium Normal right atrial size noted.      Aortic Valve The aortic valve is abnormal in structure. There is calcification of the aortic valve.No significant aortic valve regurgitation is present. Mild aortic valve stenosis is present.      Mitral Valve The mitral valve is grossly normal in structure. Trace mitral valve regurgitation is present.      Tricuspid Valve The tricuspid valve is grossly normal. Trace tricuspid valve regurgitation is present.      Pulmonic Valve The pulmonic valve is grossly normal in  structure. There is no significant pulmonic valve regurgitation present.      Greater Vessels No dilation of the aortic root is present. No dilation of the sinuses of Valsalva is present.      Pericardium There is no evidence of pericardial effusion                   SUBJECTIVE:    Allergies   Allergen Reactions   • Latex Itching   • Penicillins Swelling and Rash         Past Medical History:   Diagnosis Date   • Acute bronchitis    • Acute sinusitis    • Acute upper respiratory infection    • Anxiety    • Cellulitis of lower leg    • Chronic urinary tract infection    • Cough    • Death of     • Diabetic asymmetric polyneuropathy (CMS/HCC)    • Diabetic peripheral neuropathy (CMS/HCC)    • Diabetic polyneuropathy (CMS/HCC)    • Disease of nail    • Dorsalgia    • Flank pain    • Hashimoto's thyroiditis    • History of echocardiogram 02/19/2013   • Hypertensive disorder    • Insomnia    • Mixed hyperlipidemia    • Non-healing surgical wound    • Osteoarthritis of multiple joints    • Peripheral vascular disease (CMS/HCC)    • Seasonal allergic rhinitis    • Type II diabetes mellitus, uncontrolled (CMS/HCC)    • Upper respiratory infection    • Urinary tract infectious disease    • Urinary tract infectious disease    • Vitamin D deficiency          Past Surgical History:   Procedure Laterality Date   • EXCISION MASS LEG Left 11/05/2013    Excision of soft tissue mass of left leg   • HYSTERECTOMY      Age 27   • STEROID INJECTION  02/08/2016    Depo Medrol (Methylprednisone) 80mg (Acute upper respiratory infection, unspecified)          History reviewed. No pertinent family history.      Social History     Socioeconomic History   • Marital status:      Spouse name: Not on file   • Number of children: Not on file   • Years of education: Not on file   • Highest education level: Not on file   Tobacco Use   • Smoking status: Never Smoker   • Smokeless tobacco: Never Used   Substance and Sexual Activity   •  Alcohol use: No   • Drug use: No   • Sexual activity: Defer         Current Outpatient Medications   Medication Sig Dispense Refill   • albuterol (VENTOLIN HFA) 108 (90 Base) MCG/ACT inhaler Inhale 2 puffs Every 6 (Six) Hours As Needed for Wheezing or Shortness of Air. 1 inhaler 3   • amLODIPine (NORVASC) 10 MG tablet TAKE 1 TABLET BY MOUTH DAILY. 30 tablet 4   • aspirin 81 MG tablet Take 81 mg by mouth daily.     • B-D ULTRAFINE III SHORT PEN 31G X 8 MM misc Use and discard 1 pen needle 4 times daily. 200 each 5   • Calcium Carbonate (CALCIUM 600 PO) Take 2 tablets by mouth daily.     • diclofenac (VOLTAREN) 1 % gel gel Apply 4 g topically 4 (Four) Times a Day. 100 g 11   • DULoxetine (CYMBALTA) 60 MG capsule TAKE 1 CAPSULE DAILY 90 capsule 1   • ergocalciferol (ERGOCALCIFEROL) 87437 units capsule Take 1 capsule by mouth 1 (One) Time Per Week. 12 capsule 11   • famciclovir (FAMVIR) 250 MG tablet Take 1 tablet by mouth 3 (Three) Times a Day. 15 tablet 0   • fluticasone (FLONASE) 50 MCG/ACT nasal spray SPRAY 1 SPRAY INTO EACH NOSTRIL BIDM 16 g 5   • gabapentin (NEURONTIN) 300 MG capsule TAKE ONE CAPSULE BY MOUTH THREE TIMES A DAY 90 capsule 2   • HYDROcodone-acetaminophen (NORCO) 7.5-325 MG per tablet Take 1 tablet by mouth 2 (Two) Times a Day. 60 tablet 0   • insulin aspart (novoLOG FLEXPEN) 100 UNIT/ML solution pen-injector sc pen Take 15 units TID before each meal 15 pen 3   • Insulin Glargine (BASAGLAR KWIKPEN) 100 UNIT/ML injection pen Inject 15 Units under the skin Every Night. 5 pen 5   • levothyroxine (SYNTHROID, LEVOTHROID) 100 MCG tablet TAKE 1 TABLET DAILY 90 tablet 1   • LORazepam (ATIVAN) 1 MG tablet Take 1 tablet by mouth At Night As Needed for Anxiety or Sleep. 30 tablet 2   • metFORMIN (GLUCOPHAGE) 850 MG tablet TAKE 1 TABLET TWICE A DAY  WITH MEALS 180 tablet 1   • metoprolol succinate XL (TOPROL-XL) 25 MG 24 hr tablet Take 1 tablet by mouth Daily. 90 tablet 3   • montelukast (SINGULAIR) 10 MG tablet  "  6   • Needle, Disp, 31G X 5/16\" misc 1 each 4 (Four) Times a Day. Pen Needle; 400 each 3   • nitrofurantoin, macrocrystal-monohydrate, (MACROBID) 100 MG capsule Take 1 capsule by mouth 2 (Two) Times a Day. 20 capsule 0   • sulfamethoxazole-trimethoprim (BACTRIM DS) 800-160 MG per tablet Take 1 tablet by mouth 2 (Two) Times a Day. 20 tablet 0   • TRUE METRIX BLOOD GLUCOSE TEST test strip USE TO TEST FOUR TIMES DAILY 150 each 11   • TRUEPLUS LANCETS 28G misc TEST FOUR TIMES A  each 11   • irbesartan (AVAPRO) 150 MG tablet TAKE 1 TABLET BY MOUTH EVERY NIGHT. 30 tablet 5     Current Facility-Administered Medications   Medication Dose Route Frequency Provider Last Rate Last Dose   • cyanocobalamin injection 1,000 mcg  1,000 mcg Intramuscular Q28 Days Chantel Long MD   1,000 mcg at 03/07/19 1145           Review of Systems:     Constitutional:  Denies recent weight loss, weight gain, fever or chills, no change in exercise tolerance.     HENT:  Denies any hearing loss, epistaxis, hoarseness, or difficulty speaking.     Eyes: Wears eyeglasses or contact lenses.    Respiratory:  Denies dyspnea with exertion,no cough, wheezing, or hemoptysis.     Cardiovascular: Negative for palpations, chest pain, orthopnea, PND, peripheral edema, syncope, or claudication.     Gastrointestinal:  Denies change in bowel habits, dyspepsia, ulcer disease, hematochezia, or melena.     Endocrine: Negative for cold intolerance, heat intolerance, polydipsia, polyphagia and polyuria. Denies any history of weight change, polydipsia, polyuria.     Genitourinary: Negative.      Musculoskeletal: Denies any history of arthritic symptoms or back problems.     Skin:  Denies any change in hair or nails, rashes, or skin lesions.     Allergic/Immunologic: Negative.  Negative for environmental allergies, food allergies and immunocompromised state.     Neurological:  Denies any history of recurrent headaches, strokes, TIA, or seizure disorder. " "    Hematological: Denies any food allergies, seasonal allergies, bleeding disorders, or lymphadenopathy.     Psychiatric/Behavioral: Denies any history of depression, substance abuse, or change in cognitive function.       OBJECTIVE:    /72   Pulse 77   Ht 167.6 cm (66\")   Wt 80.7 kg (178 lb)   SpO2 99%   BMI 28.73 kg/m²       Physical Exam:     Constitutional: Cooperative, alert and oriented, well-developed, well-nourished, in no acute distress.     HENT:   Head: Normocephalic, normal hair patterns, no masses or tenderness.  Ears, Nose, and Throat: No gross abnormalities. No pallor or cyanosis. Dentition good.   Eyes: EOMS intact, PERRL, conjunctivae and lids unremarkable. Fundoscopic exam and visual fields not performed.   Neck: No palpable masses or adenopathy, no thyromegaly, no JVD, carotid pulses are full and equal bilaterally and without  Bruits.     Cardiovascular: Regular rhythm, S1 and S2 normal, no S3 or S4. Apical impulse not displaced. No murmurs, gallops, or rubs detected.     Pulmonary/Chest: Chest: normal symmetry, no tenderness to palpation, normal respiratory excursion, no intercostal retraction, no use of accessory muscles. Pulmonary: Normal breath sounds. No rales or rhonchi.    Abdominal: Abdomen soft, bowel sounds normoactive, no masses, no hepatosplenomegaly, non-tender, no bruits.     Musculoskeletal: No deformities, clubbing, cyanosis, erythema, or edema observed. There are no spinal abnormalities noted. Normal muscle strength and tone. Pulses full and equal in all extremities, no bruits auscultated.     Neurological: No gross motor or sensory deficits noted, affect appropriate, oriented to time, person, place.     Skin: Warm and dry to the touch, no apparent skin lesions or masses noted.     Psychiatric: She has a normal mood and affect. Her behavior is normal. Judgment and thought content normal.         Procedures      Lab Results   Component Value Date    WBC 8.10 08/06/2019 "    HGB 12.8 08/06/2019    HCT 38.1 08/06/2019    MCV 91.8 08/06/2019     08/06/2019     Lab Results   Component Value Date    GLUCOSE 162 (H) 08/06/2019    BUN 14 08/06/2019    CREATININE 0.63 08/06/2019    EGFRIFNONA 90 08/06/2019    BCR 22.2 08/06/2019    CO2 25.0 08/06/2019    CALCIUM 9.3 08/06/2019    ALBUMIN 4.10 08/06/2019    AST 19 08/06/2019    ALT 15 08/06/2019     Lab Results   Component Value Date    CHOL 232 (H) 08/06/2019    CHOL 188 02/05/2019    CHOL 183 01/24/2018     Lab Results   Component Value Date    TRIG 158 (H) 08/06/2019    TRIG 151 (H) 02/05/2019    TRIG 92 01/24/2018     Lab Results   Component Value Date    HDL 57 08/06/2019    HDL 55 02/05/2019    HDL 75 01/24/2018     No components found for: LDLCALC  Lab Results   Component Value Date     (H) 08/06/2019     (H) 02/05/2019    LDL 90 01/24/2018     No results found for: HDLLDLRATIO  No components found for: CHOLHDL  Lab Results   Component Value Date    HGBA1C 6.90 (H) 08/06/2019     Lab Results   Component Value Date    TSH 1.770 08/06/2019           ASSESSMENT AND PLAN:  #1 nonsustained wide QRS complex tachycardia   #2 long-standing history diabetes   #3 hypertension   #4 hyperlipidemia   #5 mild dyspnea exertion with associated fatigability   #6 systolic murmur     82 years old physically active with a background history of hypertension, hyperlipidemia, long-standing history of diabetes, history of secondhand smoking from underwent cardiac evaluation with Holter monitor, echocardiographic study and stress test.    Previously, finding of monitor echo and stress test discussed with the patient.  Patient documented nonsustained supraventricular tachycardia and 6 beat of wide complex tachycardia.  Given  normal stress test with normal normal left and a systolic function no further risk stratification needed.  The patient denied any further palpitation.    Patient was started on beta-blocker and no further recurrence  of palpitations and she will continue insulin for management of diabetes Synthroid for hypothyroidism and Avapro for management of hypertension with hypertensive heart disease.  Significant of low carbohydrate, low-fat, DASH diet discussed with the patient.  Risk factor lifestyle modification discussed.  Heart shortness of breath multifactorial in etiology given her body habitus and comorbid condition and history of back pain and diabetic neuropathy.        Nyla was seen today for follow-up.    Diagnoses and all orders for this visit:    Essential hypertension    Controlled type 2 diabetes mellitus without complication, with long-term current use of insulin (CMS/HCC)    Peripheral vascular disease (CMS/HCC)    Tachycardia    Uncontrolled type 2 diabetes mellitus with hyperglycemia (CMS/HCC)    Mixed hyperlipidemia        Eagle Haywood MD  8/20/2019  11:35 AM

## 2019-08-26 RX ORDER — MONTELUKAST SODIUM 10 MG/1
10 TABLET ORAL NIGHTLY
Qty: 30 TABLET | Refills: 6 | OUTPATIENT
Start: 2019-08-26 | End: 2020-02-22

## 2019-09-12 RX ORDER — MONTELUKAST SODIUM 10 MG/1
10 TABLET ORAL NIGHTLY
Qty: 30 TABLET | Refills: 6 | OUTPATIENT
Start: 2019-09-12 | End: 2020-03-10

## 2019-09-13 RX ORDER — MONTELUKAST SODIUM 10 MG/1
10 TABLET ORAL NIGHTLY
Qty: 30 TABLET | Refills: 5 | Status: SHIPPED | OUTPATIENT
Start: 2019-09-13 | End: 2020-04-16

## 2019-09-19 ENCOUNTER — OFFICE VISIT (OUTPATIENT)
Dept: FAMILY MEDICINE CLINIC | Facility: CLINIC | Age: 84
End: 2019-09-19

## 2019-09-19 VITALS
HEART RATE: 108 BPM | OXYGEN SATURATION: 96 % | WEIGHT: 180 LBS | SYSTOLIC BLOOD PRESSURE: 142 MMHG | DIASTOLIC BLOOD PRESSURE: 90 MMHG | HEIGHT: 66 IN | TEMPERATURE: 97.9 F | BODY MASS INDEX: 28.93 KG/M2

## 2019-09-19 DIAGNOSIS — J06.9 ACUTE URI: ICD-10-CM

## 2019-09-19 DIAGNOSIS — I49.9 IRREGULAR HEARTBEAT: ICD-10-CM

## 2019-09-19 DIAGNOSIS — R01.1 CARDIAC MURMUR: ICD-10-CM

## 2019-09-19 DIAGNOSIS — Z00.00 MEDICARE ANNUAL WELLNESS VISIT, INITIAL: Primary | ICD-10-CM

## 2019-09-19 DIAGNOSIS — R05.9 COUGH: ICD-10-CM

## 2019-09-19 PROCEDURE — 96372 THER/PROPH/DIAG INJ SC/IM: CPT | Performed by: NURSE PRACTITIONER

## 2019-09-19 PROCEDURE — 99214 OFFICE O/P EST MOD 30 MIN: CPT | Performed by: NURSE PRACTITIONER

## 2019-09-19 PROCEDURE — G0438 PPPS, INITIAL VISIT: HCPCS | Performed by: NURSE PRACTITIONER

## 2019-09-19 PROCEDURE — 93005 ELECTROCARDIOGRAM TRACING: CPT | Performed by: NURSE PRACTITIONER

## 2019-09-19 RX ORDER — METHYLPREDNISOLONE ACETATE 80 MG/ML
80 INJECTION, SUSPENSION INTRA-ARTICULAR; INTRALESIONAL; INTRAMUSCULAR; SOFT TISSUE ONCE
Status: COMPLETED | OUTPATIENT
Start: 2019-09-19 | End: 2019-09-19

## 2019-09-19 RX ORDER — CEFDINIR 300 MG/1
300 CAPSULE ORAL 2 TIMES DAILY
Qty: 20 CAPSULE | Refills: 0 | Status: SHIPPED | OUTPATIENT
Start: 2019-09-19 | End: 2019-10-09

## 2019-09-19 RX ADMIN — METHYLPREDNISOLONE ACETATE 80 MG: 80 INJECTION, SUSPENSION INTRA-ARTICULAR; INTRALESIONAL; INTRAMUSCULAR; SOFT TISSUE at 15:03

## 2019-09-19 NOTE — PROGRESS NOTES
Subjective   Nyla Piña is a 84 y.o. female. Patient here today with complaints of URI and Medicare Wellness-Initial Visit  Patient here today with friend complaining of chest congestion history of COPD, has had sinus pain and pressure without fever, reports history of COPD and sputum brownish to greenish in color.  She has history of pneumonia in May 2018.  Symptoms started on Sunday.  Denies tobacco use.  PCP Dr. Bloom.  Cardiologist Dr. Haywood.    Vitals:    09/19/19 1416   BP: 142/90   Pulse: 108   Temp: 97.9 °F (36.6 °C)   SpO2: 96%     Past Medical History:   Diagnosis Date   • Acute bronchitis    • Acute sinusitis    • Acute upper respiratory infection    • Anxiety    • Cellulitis of lower leg    • Chronic urinary tract infection    • Cough    • Death of     • Diabetic asymmetric polyneuropathy (CMS/HCC)    • Diabetic peripheral neuropathy (CMS/HCC)    • Diabetic polyneuropathy (CMS/HCC)    • Disease of nail    • Dorsalgia    • Flank pain    • Hashimoto's thyroiditis    • History of echocardiogram 02/19/2013   • Hypertensive disorder    • Insomnia    • Mixed hyperlipidemia    • Non-healing surgical wound    • Osteoarthritis of multiple joints    • Peripheral vascular disease (CMS/HCC)    • Seasonal allergic rhinitis    • Type II diabetes mellitus, uncontrolled (CMS/HCC)    • Upper respiratory infection    • Urinary tract infectious disease    • Urinary tract infectious disease    • Vitamin D deficiency      URI    This is a new problem. The current episode started in the past 7 days. The problem has been unchanged. There has been no fever. Associated symptoms include congestion, coughing, ear pain, sinus pain and a sore throat. She has tried nothing for the symptoms. The treatment provided no relief.        The following portions of the patient's history were reviewed and updated as appropriate: allergies, current medications, past family history, past medical history, past social history,  past surgical history and problem list.    Review of Systems   Constitutional: Negative.    HENT: Positive for congestion, ear pain, sinus pain and sore throat.    Eyes: Negative.    Respiratory: Positive for cough.    Cardiovascular: Negative.    Gastrointestinal: Negative.    Endocrine: Negative.    Genitourinary: Negative.    Musculoskeletal: Negative.    Skin: Negative.    Allergic/Immunologic: Negative.    Neurological: Negative.    Hematological: Negative.    Psychiatric/Behavioral: Negative.        Objective   Physical Exam   Constitutional: She is oriented to person, place, and time. She appears well-developed and well-nourished. No distress.   HENT:   Head: Normocephalic and atraumatic.   Right Ear: External ear normal.   Left Ear: External ear normal.   Mouth/Throat: Oropharynx is clear and moist.   Cardiovascular: Frequent extrasystoles are present. Tachycardia present. Exam reveals no gallop and no friction rub.   No murmur heard.  , irregular , murmur ausc    Pulmonary/Chest: Effort normal. No stridor. No respiratory distress. She has decreased breath sounds. She has no wheezes. She has no rales.   Sl diminished sounds noted throughout, pulse ox on RA 96%   Neurological: She is alert and oriented to person, place, and time.   Skin: Skin is warm and dry. She is not diaphoretic. No erythema.   Psychiatric: She has a normal mood and affect.   Nursing note and vitals reviewed.      Assessment/Plan   Nyla was seen today for uri and medicare wellness-initial visit.    Diagnoses and all orders for this visit:    Medicare annual wellness visit, initial    Cough  -     methylPREDNISolone acetate (DEPO-medrol) injection 80 mg  -     XR Chest 2 View    Acute URI    Cardiac murmur    Irregular heartbeat    Other orders  -     cefdinir (OMNICEF) 300 MG capsule; Take 1 capsule by mouth 2 (Two) Times a Day.    EKG completed in office, does reveal patient may be going in and out of A. fib, heart rate low 100s,  denies any history of irregular heartbeat or murmur in the past although looking through her chart she does have a known cardiac murmur.  Cardiologist office called and informed and copy of EKG faxed to him for review to see if he wants to see her sooner, PCP out of office today.  I will obtain chest x-ray on her and give her Depo-Medrol 80 mg injection IM in office today which she has tolerated well in the past and give her Omnicef as above, she is advised to monitor symptoms and should she become weak fatigued, dizzy, develop shortness of breath or chest pain she is advised to go on to ER for further evaluation prior to seeing cardiology.  They are aware and are in agreement to this plan.  Will otherwise follow-up with Dr. Bloom next week as scheduled.  All questions and concerns are addressed with understanding noted. She states she has cough medicine at home to use prn.     ECG 12 Lead  Date/Time: 9/19/2019 4:37 PM  Performed by: Christine Card APRN  Authorized by: Christine Card APRN   Comparison: not compared with previous ECG   Rhythm: atrial fibrillation  Rate: tachycardic    Clinical impression: abnormal EKG

## 2019-09-19 NOTE — PROGRESS NOTES
The ABCs of the Annual Wellness Visit  Initial Medicare Wellness Visit    Chief Complaint   Patient presents with   • URI   • Medicare Wellness-Initial Visit       Subjective   History of Present Illness:  Nyla Piña is a 84 y.o. female who presents for an Initial Medicare Wellness Visit.    HEALTH RISK ASSESSMENT    Recent Hospitalizations:  No hospitalization(s) within the last year.    Current Medical Providers:  Patient Care Team:  Chantel Long MD as PCP - General (Family Medicine)  Rocio Coyle MA as Medical Assistant  Hieu Lozano MD as Surgeon (Orthopedic Surgery)    Smoking Status:  Social History     Tobacco Use   Smoking Status Never Smoker   Smokeless Tobacco Never Used       Alcohol Consumption:  Social History     Substance and Sexual Activity   Alcohol Use No       Depression Screen:   PHQ-2/PHQ-9 Depression Screening 12/20/2018   Little interest or pleasure in doing things 0   Feeling down, depressed, or hopeless 0   Trouble falling or staying asleep, or sleeping too much -   Feeling tired or having little energy -   Poor appetite or overeating -   Feeling bad about yourself - or that you are a failure or have let yourself or your family down -   Trouble concentrating on things, such as reading the newspaper or watching television -   Moving or speaking so slowly that other people could have noticed. Or the opposite - being so fidgety or restless that you have been moving around a lot more than usual -   Thoughts that you would be better off dead, or of hurting yourself in some way -   Total Score 0       Fall Risk Screen:  MICKIADI Fall Risk Assessment has not been completed.    Health Habits and Functional and Cognitive Screening:  Functional & Cognitive Status 9/19/2019   Do you have difficulty preparing food and eating? No   Do you have difficulty bathing yourself, getting dressed or grooming yourself? No   Do you have difficulty using the toilet? No   Do you  have difficulty moving around from place to place? No   Do you have trouble with steps or getting out of a bed or a chair? No   Current Diet Diabetic Diet   Dental Exam Not up to date   Eye Exam Not up to date   Exercise (times per week) 0 times per week   Current Exercise Activities Include None   Do you need help using the phone?  No   Are you deaf or do you have serious difficulty hearing?  No   Do you need help with transportation? No   Do you need help shopping? No   Do you need help preparing meals?  No   Do you need help with housework?  Yes   Do you need help with laundry? No   Do you need help taking your medications? No   Do you need help managing money? No   Do you ever drive or ride in a car without wearing a seat belt? No   Have you felt unusual stress, anger or loneliness in the last month? Yes   Who do you live with? Alone   If you need help, do you have trouble finding someone available to you? No   Have you been bothered in the last four weeks by sexual problems? No   Do you have difficulty concentrating, remembering or making decisions? Yes         Does the patient have evidence of cognitive impairment? No    Asprin use counseling:Taking ASA appropriately as indicated    Age-appropriate Screening Schedule:  Refer to the list below for future screening recommendations based on patient's age, sex and/or medical conditions. Orders for these recommended tests are listed in the plan section. The patient has been provided with a written plan.    Health Maintenance   Topic Date Due   • INFLUENZA VACCINE  08/01/2019   • ZOSTER VACCINE (2 of 2) 12/23/2019 (Originally 10/10/2016)   • HEMOGLOBIN A1C  02/06/2020   • URINE MICROALBUMIN  03/28/2020   • LIPID PANEL  08/06/2020   • TDAP/TD VACCINES (2 - Td) 04/10/2027   • PNEUMOCOCCAL VACCINES (65+ LOW/MEDIUM RISK)  Addressed          The following portions of the patient's history were reviewed and updated as appropriate:   She  has a past medical history of  Acute bronchitis, Acute sinusitis, Acute upper respiratory infection, Anxiety, Cellulitis of lower leg, Chronic urinary tract infection, Cough, Death of , Diabetic asymmetric polyneuropathy (CMS/HCC), Diabetic peripheral neuropathy (CMS/HCC), Diabetic polyneuropathy (CMS/HCC), Disease of nail, Dorsalgia, Flank pain, Hashimoto's thyroiditis, History of echocardiogram (02/19/2013), Hypertensive disorder, Insomnia, Mixed hyperlipidemia, Non-healing surgical wound, Osteoarthritis of multiple joints, Peripheral vascular disease (CMS/HCC), Seasonal allergic rhinitis, Type II diabetes mellitus, uncontrolled (CMS/HCC), Upper respiratory infection, Urinary tract infectious disease, Urinary tract infectious disease, and Vitamin D deficiency.  She does not have any pertinent problems on file.  She  has a past surgical history that includes Steroid Injection (02/08/2016); Excision Mass Leg (Left, 11/05/2013); and Hysterectomy.  Her family history is not on file.  She  reports that she has never smoked. She has never used smokeless tobacco. She reports that she does not drink alcohol or use drugs.  Current Outpatient Medications   Medication Sig Dispense Refill   • albuterol (VENTOLIN HFA) 108 (90 Base) MCG/ACT inhaler Inhale 2 puffs Every 6 (Six) Hours As Needed for Wheezing or Shortness of Air. 1 inhaler 3   • amLODIPine (NORVASC) 10 MG tablet TAKE 1 TABLET BY MOUTH DAILY. 30 tablet 4   • aspirin 81 MG tablet Take 81 mg by mouth daily.     • B-D ULTRAFINE III SHORT PEN 31G X 8 MM misc Use and discard 1 pen needle 4 times daily. 200 each 5   • Calcium Carbonate (CALCIUM 600 PO) Take 2 tablets by mouth daily.     • diclofenac (VOLTAREN) 1 % gel gel Apply 4 g topically 4 (Four) Times a Day. 100 g 11   • DULoxetine (CYMBALTA) 60 MG capsule TAKE 1 CAPSULE DAILY 90 capsule 1   • ergocalciferol (ERGOCALCIFEROL) 12971 units capsule Take 1 capsule by mouth 1 (One) Time Per Week. 12 capsule 11   • famciclovir (FAMVIR) 250 MG  "tablet Take 1 tablet by mouth 3 (Three) Times a Day. 15 tablet 0   • fluticasone (FLONASE) 50 MCG/ACT nasal spray SPRAY 1 SPRAY INTO EACH NOSTRIL BIDM 16 g 5   • gabapentin (NEURONTIN) 300 MG capsule TAKE ONE CAPSULE BY MOUTH THREE TIMES A DAY 90 capsule 2   • HYDROcodone-acetaminophen (NORCO) 7.5-325 MG per tablet Take 1 tablet by mouth 2 (Two) Times a Day. 60 tablet 0   • insulin aspart (novoLOG FLEXPEN) 100 UNIT/ML solution pen-injector sc pen Take 15 units TID before each meal 15 pen 3   • Insulin Glargine (BASAGLAR KWIKPEN) 100 UNIT/ML injection pen Inject 15 Units under the skin Every Night. 5 pen 5   • irbesartan (AVAPRO) 150 MG tablet TAKE 1 TABLET BY MOUTH EVERY NIGHT. 30 tablet 5   • levothyroxine (SYNTHROID, LEVOTHROID) 100 MCG tablet TAKE 1 TABLET DAILY 90 tablet 1   • LORazepam (ATIVAN) 1 MG tablet Take 1 tablet by mouth At Night As Needed for Anxiety or Sleep. 30 tablet 2   • metFORMIN (GLUCOPHAGE) 850 MG tablet TAKE 1 TABLET TWICE A DAY  WITH MEALS 180 tablet 1   • metoprolol succinate XL (TOPROL-XL) 25 MG 24 hr tablet Take 1 tablet by mouth Daily. 90 tablet 3   • montelukast (SINGULAIR) 10 MG tablet   6   • montelukast (SINGULAIR) 10 MG tablet TAKE 1 TABLET BY MOUTH EVERY NIGHT  DAYS. 30 tablet 5   • Needle, Disp, 31G X 5/16\" misc 1 each 4 (Four) Times a Day. Pen Needle; 400 each 3   • TRUE METRIX BLOOD GLUCOSE TEST test strip USE TO TEST FOUR TIMES DAILY 150 each 11   • TRUEPLUS LANCETS 28G misc TEST FOUR TIMES A  each 11   • cefdinir (OMNICEF) 300 MG capsule Take 1 capsule by mouth 2 (Two) Times a Day. 20 capsule 0     Current Facility-Administered Medications   Medication Dose Route Frequency Provider Last Rate Last Dose   • cyanocobalamin injection 1,000 mcg  1,000 mcg Intramuscular Q28 Days Chantel Long MD   1,000 mcg at 03/07/19 1145     Current Outpatient Medications on File Prior to Visit   Medication Sig   • albuterol (VENTOLIN HFA) 108 (90 Base) MCG/ACT inhaler Inhale 2 " "puffs Every 6 (Six) Hours As Needed for Wheezing or Shortness of Air.   • amLODIPine (NORVASC) 10 MG tablet TAKE 1 TABLET BY MOUTH DAILY.   • aspirin 81 MG tablet Take 81 mg by mouth daily.   • B-D ULTRAFINE III SHORT PEN 31G X 8 MM misc Use and discard 1 pen needle 4 times daily.   • Calcium Carbonate (CALCIUM 600 PO) Take 2 tablets by mouth daily.   • diclofenac (VOLTAREN) 1 % gel gel Apply 4 g topically 4 (Four) Times a Day.   • DULoxetine (CYMBALTA) 60 MG capsule TAKE 1 CAPSULE DAILY   • ergocalciferol (ERGOCALCIFEROL) 17936 units capsule Take 1 capsule by mouth 1 (One) Time Per Week.   • famciclovir (FAMVIR) 250 MG tablet Take 1 tablet by mouth 3 (Three) Times a Day.   • fluticasone (FLONASE) 50 MCG/ACT nasal spray SPRAY 1 SPRAY INTO EACH NOSTRIL BIDM   • gabapentin (NEURONTIN) 300 MG capsule TAKE ONE CAPSULE BY MOUTH THREE TIMES A DAY   • HYDROcodone-acetaminophen (NORCO) 7.5-325 MG per tablet Take 1 tablet by mouth 2 (Two) Times a Day.   • insulin aspart (novoLOG FLEXPEN) 100 UNIT/ML solution pen-injector sc pen Take 15 units TID before each meal   • Insulin Glargine (BASAGLAR KWIKPEN) 100 UNIT/ML injection pen Inject 15 Units under the skin Every Night.   • irbesartan (AVAPRO) 150 MG tablet TAKE 1 TABLET BY MOUTH EVERY NIGHT.   • levothyroxine (SYNTHROID, LEVOTHROID) 100 MCG tablet TAKE 1 TABLET DAILY   • LORazepam (ATIVAN) 1 MG tablet Take 1 tablet by mouth At Night As Needed for Anxiety or Sleep.   • metFORMIN (GLUCOPHAGE) 850 MG tablet TAKE 1 TABLET TWICE A DAY  WITH MEALS   • metoprolol succinate XL (TOPROL-XL) 25 MG 24 hr tablet Take 1 tablet by mouth Daily.   • montelukast (SINGULAIR) 10 MG tablet    • montelukast (SINGULAIR) 10 MG tablet TAKE 1 TABLET BY MOUTH EVERY NIGHT  DAYS.   • Needle, Disp, 31G X 5/16\" misc 1 each 4 (Four) Times a Day. Pen Needle;   • TRUE METRIX BLOOD GLUCOSE TEST test strip USE TO TEST FOUR TIMES DAILY   • TRUEPLUS LANCETS 28G misc TEST FOUR TIMES A DAY   • " [DISCONTINUED] nitrofurantoin, macrocrystal-monohydrate, (MACROBID) 100 MG capsule Take 1 capsule by mouth 2 (Two) Times a Day.   • [DISCONTINUED] sulfamethoxazole-trimethoprim (BACTRIM DS) 800-160 MG per tablet Take 1 tablet by mouth 2 (Two) Times a Day.     Current Facility-Administered Medications on File Prior to Visit   Medication   • cyanocobalamin injection 1,000 mcg     She is allergic to latex and penicillins..    Outpatient Medications Prior to Visit   Medication Sig Dispense Refill   • albuterol (VENTOLIN HFA) 108 (90 Base) MCG/ACT inhaler Inhale 2 puffs Every 6 (Six) Hours As Needed for Wheezing or Shortness of Air. 1 inhaler 3   • amLODIPine (NORVASC) 10 MG tablet TAKE 1 TABLET BY MOUTH DAILY. 30 tablet 4   • aspirin 81 MG tablet Take 81 mg by mouth daily.     • B-D ULTRAFINE III SHORT PEN 31G X 8 MM misc Use and discard 1 pen needle 4 times daily. 200 each 5   • Calcium Carbonate (CALCIUM 600 PO) Take 2 tablets by mouth daily.     • diclofenac (VOLTAREN) 1 % gel gel Apply 4 g topically 4 (Four) Times a Day. 100 g 11   • DULoxetine (CYMBALTA) 60 MG capsule TAKE 1 CAPSULE DAILY 90 capsule 1   • ergocalciferol (ERGOCALCIFEROL) 89787 units capsule Take 1 capsule by mouth 1 (One) Time Per Week. 12 capsule 11   • famciclovir (FAMVIR) 250 MG tablet Take 1 tablet by mouth 3 (Three) Times a Day. 15 tablet 0   • fluticasone (FLONASE) 50 MCG/ACT nasal spray SPRAY 1 SPRAY INTO EACH NOSTRIL BIDM 16 g 5   • gabapentin (NEURONTIN) 300 MG capsule TAKE ONE CAPSULE BY MOUTH THREE TIMES A DAY 90 capsule 2   • HYDROcodone-acetaminophen (NORCO) 7.5-325 MG per tablet Take 1 tablet by mouth 2 (Two) Times a Day. 60 tablet 0   • insulin aspart (novoLOG FLEXPEN) 100 UNIT/ML solution pen-injector sc pen Take 15 units TID before each meal 15 pen 3   • Insulin Glargine (BASAGLAR KWIKPEN) 100 UNIT/ML injection pen Inject 15 Units under the skin Every Night. 5 pen 5   • irbesartan (AVAPRO) 150 MG tablet TAKE 1 TABLET BY MOUTH EVERY  "NIGHT. 30 tablet 5   • levothyroxine (SYNTHROID, LEVOTHROID) 100 MCG tablet TAKE 1 TABLET DAILY 90 tablet 1   • LORazepam (ATIVAN) 1 MG tablet Take 1 tablet by mouth At Night As Needed for Anxiety or Sleep. 30 tablet 2   • metFORMIN (GLUCOPHAGE) 850 MG tablet TAKE 1 TABLET TWICE A DAY  WITH MEALS 180 tablet 1   • metoprolol succinate XL (TOPROL-XL) 25 MG 24 hr tablet Take 1 tablet by mouth Daily. 90 tablet 3   • montelukast (SINGULAIR) 10 MG tablet   6   • montelukast (SINGULAIR) 10 MG tablet TAKE 1 TABLET BY MOUTH EVERY NIGHT  DAYS. 30 tablet 5   • Needle, Disp, 31G X 5/16\" misc 1 each 4 (Four) Times a Day. Pen Needle; 400 each 3   • TRUE METRIX BLOOD GLUCOSE TEST test strip USE TO TEST FOUR TIMES DAILY 150 each 11   • TRUEPLUS LANCETS 28G misc TEST FOUR TIMES A  each 11   • nitrofurantoin, macrocrystal-monohydrate, (MACROBID) 100 MG capsule Take 1 capsule by mouth 2 (Two) Times a Day. 20 capsule 0   • sulfamethoxazole-trimethoprim (BACTRIM DS) 800-160 MG per tablet Take 1 tablet by mouth 2 (Two) Times a Day. 20 tablet 0     Facility-Administered Medications Prior to Visit   Medication Dose Route Frequency Provider Last Rate Last Dose   • cyanocobalamin injection 1,000 mcg  1,000 mcg Intramuscular Q28 Days Chantel Long MD   1,000 mcg at 03/07/19 1145       Patient Active Problem List   Diagnosis   • Acute upper respiratory infection   • Anxiety   • Cellulitis of lower leg   • Chronic urinary tract infection   • Cough   • Diabetic asymmetric polyneuropathy (CMS/HCC)   • Diabetic peripheral neuropathy (CMS/HCC)   • Diabetic polyneuropathy (CMS/HCC)   • Chronic midline thoracic back pain   • Flank pain   • Hashimoto's thyroiditis   • Insomnia   • Mixed hyperlipidemia   • Non-healing surgical wound   • Osteoarthritis of multiple joints   • Peripheral vascular disease (CMS/HCC)   • Seasonal allergic rhinitis   • Type II diabetes mellitus, uncontrolled (CMS/HCC)   • Upper respiratory infection   • " "Urinary tract infectious disease   • Vitamin D deficiency   • Chronic pain of both knees   • Primary osteoarthritis of both knees   • Controlled type 2 diabetes mellitus without complication, with long-term current use of insulin (CMS/[facility])   • Dysuria   • Essential hypertension   • Seborrheic keratosis   • Acute left-sided thoracic back pain   • H/O macrocytic anemia   • Right knee pain   • Tachycardia   • Dyspnea   • Cardiac murmur   • Bilateral foot pain       Advanced Care Planning:  Patient has an advance directive - a copy has not been provided. Have asked the patient to send this to us to add to record    Review of Systems    Compared to one year ago, the patient feels her physical health is better.  Compared to one year ago, the patient feels her mental health is better.    Reviewed chart for potential of high risk medication in the elderly: yes  Reviewed chart for potential of harmful drug interactions in the elderly:yes    Objective         Vitals:    09/19/19 1416   BP: 142/90   Pulse: 108   Temp: 97.9 °F (36.6 °C)   SpO2: 96%   Weight: 81.6 kg (180 lb)   Height: 167.6 cm (66\")       Body mass index is 29.05 kg/m².  Discussed the patient's BMI with her. The BMI is above average; BMI management plan is completed.    Physical Exam    Lab Results   Component Value Date    TRIG 158 (H) 08/06/2019    HDL 57 08/06/2019     (H) 08/06/2019    VLDL 31.6 08/06/2019    HGBA1C 6.90 (H) 08/06/2019        Assessment/Plan   Medicare Risks and Personalized Health Plan  CMS Preventative Services Quick Reference  Advance Directive Discussion  Cardiovascular risk  Dementia/Memory   Fall Risk  Immunizations Discussed/Encouraged (specific immunizations; Influenza )  Inactivity/Sedentary  Obesity/Overweight   Polypharmacy    The above risks/problems have been discussed with the patient.  Pertinent information has been shared with the patient in the After Visit Summary.  Follow up plans and orders are seen below in the " Assessment/Plan Section.    Diagnoses and all orders for this visit:    1. Medicare annual wellness visit, initial (Primary)    2. Cough  -     methylPREDNISolone acetate (DEPO-medrol) injection 80 mg  -     XR Chest 2 View    3. Acute URI    4. Cardiac murmur    5. Irregular heartbeat    Other orders  -     cefdinir (OMNICEF) 300 MG capsule; Take 1 capsule by mouth 2 (Two) Times a Day.  Dispense: 20 capsule; Refill: 0      Follow Up:  No Follow-up on file.     An After Visit Summary and PPPS were given to the patient.

## 2019-09-20 ENCOUNTER — TELEPHONE (OUTPATIENT)
Dept: CARDIOLOGY | Facility: CLINIC | Age: 84
End: 2019-09-20

## 2019-09-20 NOTE — TELEPHONE ENCOUNTER
EKG performed at [facility] office reviewed by Dr Haywood. Atrial Fib not noted on EKG. Will see patient for her follow up in April unless problems. Patient notified

## 2019-09-26 ENCOUNTER — OFFICE VISIT (OUTPATIENT)
Dept: FAMILY MEDICINE CLINIC | Facility: CLINIC | Age: 84
End: 2019-09-26

## 2019-09-26 VITALS
HEIGHT: 66 IN | BODY MASS INDEX: 28.93 KG/M2 | SYSTOLIC BLOOD PRESSURE: 124 MMHG | WEIGHT: 180 LBS | DIASTOLIC BLOOD PRESSURE: 62 MMHG

## 2019-09-26 DIAGNOSIS — I10 ESSENTIAL HYPERTENSION: Primary | ICD-10-CM

## 2019-09-26 DIAGNOSIS — F41.9 ANXIETY: ICD-10-CM

## 2019-09-26 DIAGNOSIS — E11.42 DIABETIC PERIPHERAL NEUROPATHY (HCC): ICD-10-CM

## 2019-09-26 PROCEDURE — 99214 OFFICE O/P EST MOD 30 MIN: CPT | Performed by: FAMILY MEDICINE

## 2019-09-26 RX ORDER — GABAPENTIN 300 MG/1
CAPSULE ORAL
Qty: 90 CAPSULE | Refills: 2 | Status: SHIPPED | OUTPATIENT
Start: 2019-09-26 | End: 2019-12-19 | Stop reason: SDUPTHER

## 2019-09-26 RX ORDER — AMLODIPINE BESYLATE 10 MG/1
10 TABLET ORAL DAILY
Qty: 30 TABLET | Refills: 4 | Status: SHIPPED | OUTPATIENT
Start: 2019-09-26 | End: 2020-03-16

## 2019-09-26 RX ORDER — LORAZEPAM 1 MG/1
1 TABLET ORAL NIGHTLY PRN
Qty: 30 TABLET | Refills: 2 | Status: SHIPPED | OUTPATIENT
Start: 2019-09-26 | End: 2019-12-19 | Stop reason: SDUPTHER

## 2019-09-26 NOTE — PROGRESS NOTES
Subjective   Nyla Piña is a 84 y.o. female with hypertension, diabetes, diabetic neuropathy here today for follow-up and refills of medications.  She needs refill of amlodipine for hypertension.  Her blood pressure is been doing well.  She also needs a refill of her medicine for anxiety.  Since the death of her  is been difficult for her especially at night.  She takes gabapentin for diabetic peripheral neuropathy.  She test blood sugars daily and they have been doing well, within the goal parameters.    History of Present Illness   The following portions of the patient's history were reviewed and updated as appropriate: allergies, current medications, past family history, past medical history, past social history, past surgical history and problem list.    Review of Systems   HENT: Negative.    Cardiovascular: Negative.    Genitourinary: Negative for flank pain, frequency, hematuria and urgency.   Musculoskeletal: Positive for gait problem.   Skin: Negative.    Allergic/Immunologic: Negative for immunocompromised state.   Neurological: Negative for syncope.   Hematological: Negative.    Psychiatric/Behavioral: Positive for sleep disturbance. Negative for agitation and dysphoric mood.   All other systems reviewed and are negative.    Objective   Objective      Physical Exam   Constitutional: She is oriented to person, place, and time. She appears well-developed and well-nourished.   HENT:   Head: Normocephalic and atraumatic.   Nose: Nose normal.   Mouth/Throat: Oropharynx is clear and moist.       Eyes: Conjunctivae and EOM are normal. Pupils are equal, round, and reactive to light.   Neck: Normal range of motion. Neck supple. No JVD present. No tracheal deviation present. No thyromegaly present.   Cardiovascular: Regular rhythm and intact distal pulses.   Murmur heard.  2/6 holosystolic murmur is noted today   Pulmonary/Chest: Effort normal and breath sounds normal. She has no wheezes.    Abdominal: Soft. Bowel sounds are normal. She exhibits no distension. There is no tenderness.   Musculoskeletal: She exhibits tenderness. She exhibits no edema.   Lymphadenopathy:     She has no cervical adenopathy.   Neurological: She is alert and oriented to person, place, and time. Coordination normal.   Skin: Skin is warm and dry. No rash noted.       Psychiatric: She has a normal mood and affect        Assessment/Plan   Nyla was seen today for med refill.    Diagnoses and all orders for this visit:    Essential hypertension    Diabetic peripheral neuropathy (CMS/HCC)    Anxiety    Other orders  -     gabapentin (NEURONTIN) 300 MG capsule; TAKE ONE CAPSULE BY MOUTH THREE TIMES A DAY  -     amLODIPine (NORVASC) 10 MG tablet; Take 1 tablet by mouth Daily.  -     LORazepam (ATIVAN) 1 MG tablet; Take 1 tablet by mouth At Night As Needed for Anxiety or Sleep.    The patient has read and signed the [facility] Controlled Substance Contract.  I will continue to see patient for regular follow up appointments. Patient is well controlled on the medication.  ANGELITO has been reviewed by me and is updated every 3 months. The patient is aware of the potential for addiction and dependence.     Continue Lorazepam when needed for anxiety    Continue gabapentin for diabetic neuropathy    Continue amlodipine for hypertension.  She has labs ordered and is reminded to get them done.          This document has been electronically signed by Chantel Long MD on September 26, 2019 1:51 PM

## 2019-10-08 ENCOUNTER — TELEPHONE (OUTPATIENT)
Dept: ENDOCRINOLOGY | Facility: CLINIC | Age: 84
End: 2019-10-08

## 2019-10-08 RX ORDER — PEN NEEDLE, DIABETIC 31 GX5/16"
NEEDLE, DISPOSABLE MISCELLANEOUS
Qty: 200 EACH | Refills: 5 | Status: SHIPPED | OUTPATIENT
Start: 2019-10-08 | End: 2020-04-10

## 2019-10-08 NOTE — TELEPHONE ENCOUNTER
Called to verify if patient has renay or if we are trying to get approved. Patient stated she is currently using the FreeStyle Renay. [facility] sends sensors every 3 months. Patient stated she also needs refills on pen needles. - will send message for refill request.

## 2019-10-09 ENCOUNTER — CLINICAL SUPPORT (OUTPATIENT)
Dept: FAMILY MEDICINE CLINIC | Facility: CLINIC | Age: 84
End: 2019-10-09

## 2019-10-09 ENCOUNTER — OFFICE VISIT (OUTPATIENT)
Dept: ORTHOPEDIC SURGERY | Facility: CLINIC | Age: 84
End: 2019-10-09

## 2019-10-09 VITALS — BODY MASS INDEX: 27.97 KG/M2 | HEIGHT: 66 IN | WEIGHT: 174 LBS

## 2019-10-09 DIAGNOSIS — M25.561 RIGHT KNEE PAIN, UNSPECIFIED CHRONICITY: ICD-10-CM

## 2019-10-09 DIAGNOSIS — Z23 IMMUNIZATION DUE: Primary | ICD-10-CM

## 2019-10-09 DIAGNOSIS — M17.11 PRIMARY OSTEOARTHRITIS OF RIGHT KNEE: Primary | ICD-10-CM

## 2019-10-09 PROCEDURE — 90653 IIV ADJUVANT VACCINE IM: CPT | Performed by: FAMILY MEDICINE

## 2019-10-09 PROCEDURE — G0008 ADMIN INFLUENZA VIRUS VAC: HCPCS | Performed by: FAMILY MEDICINE

## 2019-10-09 PROCEDURE — 20610 DRAIN/INJ JOINT/BURSA W/O US: CPT | Performed by: ORTHOPAEDIC SURGERY

## 2019-10-09 RX ORDER — TRIAMCINOLONE ACETONIDE 40 MG/ML
40 INJECTION, SUSPENSION INTRA-ARTICULAR; INTRAMUSCULAR
Status: COMPLETED | OUTPATIENT
Start: 2019-10-09 | End: 2019-10-09

## 2019-10-09 RX ORDER — DULOXETIN HYDROCHLORIDE 60 MG/1
60 CAPSULE, DELAYED RELEASE ORAL DAILY
Qty: 90 CAPSULE | Refills: 1 | Status: SHIPPED | OUTPATIENT
Start: 2019-10-09 | End: 2019-12-19 | Stop reason: SDUPTHER

## 2019-10-09 RX ORDER — LIDOCAINE HYDROCHLORIDE 20 MG/ML
2 INJECTION, SOLUTION INFILTRATION; PERINEURAL
Status: COMPLETED | OUTPATIENT
Start: 2019-10-09 | End: 2019-10-09

## 2019-10-09 RX ADMIN — TRIAMCINOLONE ACETONIDE 40 MG: 40 INJECTION, SUSPENSION INTRA-ARTICULAR; INTRAMUSCULAR at 08:54

## 2019-10-09 RX ADMIN — LIDOCAINE HYDROCHLORIDE 2 ML: 20 INJECTION, SOLUTION INFILTRATION; PERINEURAL at 08:54

## 2019-10-09 NOTE — PROGRESS NOTES
"Nyla Piña is a 84 y.o. female returns for     Chief Complaint   Patient presents with   • Right Knee - Follow-up, Pain       HISTORY OF PRESENT ILLNESS: f/u right knee pain. Patient requesting evaluation for steroid injection, last injection done on 2/20/2019 by DANTE Talamantes.        CONCURRENT MEDICAL HISTORY:    The following portions of the patient's history were reviewed and updated as appropriate: allergies, current medications, past family history, past medical history, past social history, past surgical history and problem list.     ROS  No fevers or chills.  No chest pain or shortness of air.  No GI or  disturbances.    PHYSICAL EXAMINATION:       Ht 167.6 cm (66\")   Wt 78.9 kg (174 lb)   BMI 28.08 kg/m²     Physical Exam   Constitutional: She is oriented to person, place, and time. She appears well-developed and well-nourished.   Neurological: She is alert and oriented to person, place, and time.   Psychiatric: She has a normal mood and affect. Her behavior is normal. Judgment and thought content normal.       GAIT:     []  Normal  [x]  Antalgic    Assistive device: []  None  []  Walker     []  Crutches  [x]  Cane     []  Wheelchair  []  Stretcher                    ASSESSMENT:    Diagnoses and all orders for this visit:    Primary osteoarthritis of right knee  -     Large Joint Arthrocentesis: R knee    Right knee pain, unspecified chronicity  -     Large Joint Arthrocentesis: R knee      Large Joint Arthrocentesis: R knee  Date/Time: 10/9/2019 8:54 AM  Consent given by: patient  Site marked: site marked  Timeout: Immediately prior to procedure a time out was called to verify the correct patient, procedure, equipment, support staff and site/side marked as required   Supporting Documentation  Indications: pain   Procedure Details  Location: knee - R knee  Preparation: Patient was prepped and draped in the usual sterile fashion  Needle size: 22 G  Approach: anteromedial  Medications administered: " 40 mg triamcinolone acetonide 40 MG/ML; 2 mL lidocaine 2%  Patient tolerance: patient tolerated the procedure well with no immediate complications            PLAN    Plan repeat injection in right   Activity as tolerated  Continue strength and conditioning as tolerated.    Patient's Body mass index is 28.08 kg/m². BMI is above normal parameters. Recommendations include: exercise counseling and nutrition counseling.    Return if symptoms worsen or fail to improve, for recheck.    Hieu Lozano MD

## 2019-10-28 RX ORDER — LEVOTHYROXINE SODIUM 0.1 MG/1
TABLET ORAL
Qty: 90 TABLET | Refills: 1 | Status: SHIPPED | OUTPATIENT
Start: 2019-10-28 | End: 2020-08-13 | Stop reason: SDUPTHER

## 2019-10-29 ENCOUNTER — TELEPHONE (OUTPATIENT)
Dept: FAMILY MEDICINE CLINIC | Facility: CLINIC | Age: 84
End: 2019-10-29

## 2019-10-29 RX ORDER — ERGOCALCIFEROL 1.25 MG/1
50000 CAPSULE ORAL WEEKLY
Qty: 12 CAPSULE | Refills: 11 | Status: SHIPPED | OUTPATIENT
Start: 2019-10-29 | End: 2019-11-20 | Stop reason: SDUPTHER

## 2019-10-29 NOTE — TELEPHONE ENCOUNTER
PT STATES SHE SPOKE TO SOMEONE LAST WEEK AND LEFT A VOICEMAIL ABOUT NEEDING REFILLS AND SHE STILL HAS NOT GOTTEN YET. PLEASE REFILL ergocalciferol (ERGOCALCIFEROL) 22561 units capsule AND [facility]END TO [facility]

## 2019-11-08 RX ORDER — METOPROLOL SUCCINATE 25 MG/1
TABLET, EXTENDED RELEASE ORAL
Qty: 90 TABLET | Refills: 3 | Status: CANCELLED | OUTPATIENT
Start: 2019-11-08

## 2019-11-08 RX ORDER — METOPROLOL SUCCINATE 25 MG/1
25 TABLET, EXTENDED RELEASE ORAL DAILY
Qty: 90 TABLET | Refills: 3 | Status: SHIPPED | OUTPATIENT
Start: 2019-11-08 | End: 2020-09-16

## 2019-11-20 RX ORDER — ERGOCALCIFEROL 1.25 MG/1
CAPSULE ORAL
Qty: 12 CAPSULE | Refills: 11 | Status: SHIPPED | OUTPATIENT
Start: 2019-11-20

## 2019-12-03 DIAGNOSIS — R39.9 UTI SYMPTOMS: Primary | ICD-10-CM

## 2019-12-04 DIAGNOSIS — R30.0 DYSURIA: Primary | ICD-10-CM

## 2019-12-09 ENCOUNTER — LAB (OUTPATIENT)
Dept: LAB | Facility: OTHER | Age: 84
End: 2019-12-09

## 2019-12-09 DIAGNOSIS — R39.9 UTI SYMPTOMS: ICD-10-CM

## 2019-12-09 DIAGNOSIS — R30.0 DYSURIA: ICD-10-CM

## 2019-12-09 LAB
BACTERIA UR QL AUTO: ABNORMAL /HPF
BILIRUB UR QL STRIP: NEGATIVE
CLARITY UR: ABNORMAL
COLOR UR: YELLOW
GLUCOSE UR STRIP-MCNC: NEGATIVE MG/DL
HGB UR QL STRIP.AUTO: ABNORMAL
HYALINE CASTS UR QL AUTO: ABNORMAL /LPF
KETONES UR QL STRIP: NEGATIVE
LEUKOCYTE ESTERASE UR QL STRIP.AUTO: ABNORMAL
NITRITE UR QL STRIP: NEGATIVE
PH UR STRIP.AUTO: 7 [PH] (ref 5.5–8)
PROT UR QL STRIP: ABNORMAL
RBC # UR: ABNORMAL /HPF
REF LAB TEST METHOD: ABNORMAL
SP GR UR STRIP: 1.01 (ref 1–1.03)
SQUAMOUS #/AREA URNS HPF: ABNORMAL /HPF
UROBILINOGEN UR QL STRIP: ABNORMAL
WBC UR QL AUTO: ABNORMAL /HPF

## 2019-12-09 PROCEDURE — 81001 URINALYSIS AUTO W/SCOPE: CPT | Performed by: FAMILY MEDICINE

## 2019-12-09 PROCEDURE — 87086 URINE CULTURE/COLONY COUNT: CPT | Performed by: FAMILY MEDICINE

## 2019-12-09 PROCEDURE — 87077 CULTURE AEROBIC IDENTIFY: CPT | Performed by: FAMILY MEDICINE

## 2019-12-09 PROCEDURE — 87186 SC STD MICRODIL/AGAR DIL: CPT | Performed by: FAMILY MEDICINE

## 2019-12-09 RX ORDER — SULFAMETHOXAZOLE AND TRIMETHOPRIM 800; 160 MG/1; MG/1
1 TABLET ORAL 2 TIMES DAILY
Qty: 20 TABLET | Refills: 0 | Status: SHIPPED | OUTPATIENT
Start: 2019-12-09 | End: 2020-03-04 | Stop reason: SDUPTHER

## 2019-12-09 NOTE — PROGRESS NOTES
Please call the patient regarding her abnormal result.  She has another UTI.  I sent in an antibiotic for her

## 2019-12-11 LAB — BACTERIA SPEC AEROBE CULT: ABNORMAL

## 2019-12-12 ENCOUNTER — OFFICE VISIT (OUTPATIENT)
Dept: FAMILY MEDICINE CLINIC | Facility: CLINIC | Age: 84
End: 2019-12-12

## 2019-12-12 VITALS
DIASTOLIC BLOOD PRESSURE: 74 MMHG | HEART RATE: 101 BPM | SYSTOLIC BLOOD PRESSURE: 126 MMHG | RESPIRATION RATE: 16 BRPM | HEIGHT: 66 IN | OXYGEN SATURATION: 99 % | BODY MASS INDEX: 28.42 KG/M2 | TEMPERATURE: 97.2 F | WEIGHT: 176.8 LBS

## 2019-12-12 DIAGNOSIS — H92.03 OTALGIA OF BOTH EARS: Primary | ICD-10-CM

## 2019-12-12 PROCEDURE — 99213 OFFICE O/P EST LOW 20 MIN: CPT | Performed by: NURSE PRACTITIONER

## 2019-12-12 RX ORDER — LORATADINE 10 MG/1
10 TABLET ORAL DAILY
Qty: 10 TABLET | Refills: 0 | Status: SHIPPED | OUTPATIENT
Start: 2019-12-12 | End: 2019-12-22

## 2019-12-12 NOTE — PATIENT INSTRUCTIONS
Earache, Adult  An earache, or ear pain, can be caused by many things, including:  · An infection.  · Ear wax buildup.  · Ear pressure.  · Something in the ear that should not be there (foreign body).  · A sore throat.  · Tooth problems.  · Jaw problems.  Treatment of the earache will depend on the cause. If the cause is not clear or cannot be determined, you may need to watch your symptoms until your earache goes away or until a cause is found.  Follow these instructions at home:  Pay attention to any changes in your symptoms. Take these actions to help with your pain:  · Take or apply over-the-counter and prescription medicines only as told by your health care provider.  · If you were prescribed an antibiotic medicine, use it as told by your health care provider. Do not stop using the antibiotic even if you start to feel better.  · Do not put anything in your ear other than medicine that is prescribed by your health care provider.  · If directed, apply heat to the affected area as often as told by your health care provider. Use the heat source that your health care provider recommends, such as a moist heat pack or a heating pad.  ? Place a towel between your skin and the heat source.  ? Leave the heat on for 20-30 minutes.  ? Remove the heat if your skin turns bright red. This is especially important if you are unable to feel pain, heat, or cold. You may have a greater risk of getting burned.  · If directed, put ice on the ear:  ? Put ice in a plastic bag.  ? Place a towel between your skin and the bag.  ? Leave the ice on for 20 minutes, 2-3 times a day.  · Try resting in an upright position instead of lying down. This may help to reduce pressure in your ear and relieve pain.  · Chew gum if it helps to relieve your ear pain.  · Treat any allergies as told by your health care provider.  · Keep all follow-up visits as told by your health care provider. This is important.  Contact a health care provider if:  · Your  pain does not improve within 2 days.  · Your earache gets worse.  · You have new symptoms.  · You have a fever.  Get help right away if:  · You have a severe headache.  · You have a stiff neck.  · You have trouble swallowing.  · You have redness or swelling behind your ear.  · You have fluid or blood coming from your ear.  · You have hearing loss.  · You feel dizzy.  This information is not intended to replace advice given to you by your health care provider. Make sure you discuss any questions you have with your health care provider.  Document Released: 08/04/2005 Document Revised: 08/15/2017 Document Reviewed: 06/12/2017  [employer] Patient Education © 2019 [employer]

## 2019-12-19 ENCOUNTER — OFFICE VISIT (OUTPATIENT)
Dept: FAMILY MEDICINE CLINIC | Facility: CLINIC | Age: 84
End: 2019-12-19

## 2019-12-19 VITALS
HEIGHT: 66 IN | BODY MASS INDEX: 28.61 KG/M2 | DIASTOLIC BLOOD PRESSURE: 78 MMHG | WEIGHT: 178 LBS | SYSTOLIC BLOOD PRESSURE: 134 MMHG

## 2019-12-19 DIAGNOSIS — L97.521 ULCER OF LEFT SECOND TOE, LIMITED TO BREAKDOWN OF SKIN (HCC): ICD-10-CM

## 2019-12-19 DIAGNOSIS — F41.9 ANXIETY: ICD-10-CM

## 2019-12-19 DIAGNOSIS — I47.1 PAROXYSMAL SVT (SUPRAVENTRICULAR TACHYCARDIA) (HCC): ICD-10-CM

## 2019-12-19 DIAGNOSIS — E11.42 DIABETIC PERIPHERAL NEUROPATHY (HCC): ICD-10-CM

## 2019-12-19 DIAGNOSIS — E11.65 UNCONTROLLED TYPE 2 DIABETES MELLITUS WITH HYPERGLYCEMIA (HCC): Primary | ICD-10-CM

## 2019-12-19 DIAGNOSIS — F33.1 MODERATE EPISODE OF RECURRENT MAJOR DEPRESSIVE DISORDER (HCC): ICD-10-CM

## 2019-12-19 DIAGNOSIS — J44.9 CHRONIC OBSTRUCTIVE PULMONARY DISEASE, UNSPECIFIED COPD TYPE (HCC): ICD-10-CM

## 2019-12-19 PROCEDURE — 99214 OFFICE O/P EST MOD 30 MIN: CPT | Performed by: FAMILY MEDICINE

## 2019-12-19 RX ORDER — GABAPENTIN 300 MG/1
CAPSULE ORAL
Qty: 90 CAPSULE | Refills: 2 | Status: SHIPPED | OUTPATIENT
Start: 2019-12-19 | End: 2020-03-16 | Stop reason: SDUPTHER

## 2019-12-19 RX ORDER — INSULIN GLARGINE 100 [IU]/ML
INJECTION, SOLUTION SUBCUTANEOUS
Qty: 5 PEN | Refills: 5 | Status: SHIPPED | OUTPATIENT
Start: 2019-12-19

## 2019-12-19 RX ORDER — DULOXETIN HYDROCHLORIDE 60 MG/1
60 CAPSULE, DELAYED RELEASE ORAL DAILY
Qty: 90 CAPSULE | Refills: 1 | Status: SHIPPED | OUTPATIENT
Start: 2019-12-19 | End: 2020-01-08

## 2019-12-19 RX ORDER — LORAZEPAM 1 MG/1
1 TABLET ORAL NIGHTLY PRN
Qty: 30 TABLET | Refills: 2 | Status: SHIPPED | OUTPATIENT
Start: 2019-12-19 | End: 2020-03-16 | Stop reason: SDUPTHER

## 2019-12-19 NOTE — PROGRESS NOTES
"Subjective   Nyla Piña is a 84 y.o. female with hypertension, diabetes, diabetic neuropathy here today for follow-up and refills of medications.  She continues to feel very tired all the time her blood pressure is been doing well.  She also needs a refill of her medicine for anxiety.  Since the death of her  it has been difficult for her especially at night.  She tells me today she is very lonely.  She really has no family nearby, her children and grandchildren live out of state.  It does make her somewhat depressed.  She is been talking to a couple lady she knows about maybe going to the [employer].  She takes gabapentin for diabetic peripheral neuropathy.  She test blood sugars daily and they have been doing well, within the goal parameters.  She has an ulceration is come up over the dorsal surface of the left great toe.    History of Present Illness   The following portions of the patient's history were reviewed and updated as appropriate: allergies, current medications, past family history, past medical history, past social history, past surgical history and problem list.    Review of Systems   HENT: Negative.    Cardiovascular: Negative.    Genitourinary: Negative for flank pain, frequency, hematuria and urgency.   Musculoskeletal: Positive for gait problem.   Skin: Negative.    Allergic/Immunologic: Negative for immunocompromised state.   Neurological: Negative for syncope.   Hematological: Negative.    Psychiatric/Behavioral: Positive for sleep disturbance. Negative for agitation and dysphoric mood.   All other systems reviewed and are negative.    Objective    Vitals:    12/19/19 1432   BP: 134/78   Weight: 80.7 kg (178 lb)   Height: 167.6 cm (66\")   PainSc:   6   PainLoc: Back     Body mass index is 28.73 kg/m².    Objective      Physical Exam   Constitutional: She is oriented to person, place, and time. She appears well-developed and well-nourished.   HENT:   Head: " Normocephalic and atraumatic.   Nose: Nose normal.   Mouth/Throat: Oropharynx is clear and moist.       Eyes: Conjunctivae and EOM are normal. Pupils are equal, round, and reactive to light.   Neck: Normal range of motion. Neck supple. No JVD present. No tracheal deviation present. No thyromegaly present.   Cardiovascular: Regular rhythm and intact distal pulses.   Murmur heard.  2/6 holosystolic murmur is noted today   Pulmonary/Chest: Effort normal and breath sounds normal. She has no wheezes.   Abdominal: Soft. Bowel sounds are normal. She exhibits no distension. There is no tenderness.   Musculoskeletal: She exhibits tenderness. She exhibits no edema.   The left great toe over the distal DIP joint shows a round 6 to 7 mm shallow ulceration.  She has an overlapping toe of the second toe onto the great toe  Lymphadenopathy:     She has no cervical adenopathy.   Neurological: She is alert and oriented to person, place, and time. Coordination normal.   Skin: Skin is warm and dry. No rash noted.       Psychiatric: She has a normal mood and affect        Assessment/Plan   Nyla was seen today for med refill.    Diagnoses and all orders for this visit:    Uncontrolled type 2 diabetes mellitus with hyperglycemia (CMS/HCC)    Diabetic peripheral neuropathy (CMS/HCC)    Anxiety  -     LORazepam (ATIVAN) 1 MG tablet; Take 1 tablet by mouth At Night As Needed for Anxiety or Sleep.    Paroxysmal SVT (supraventricular tachycardia) (CMS/HCC)    Chronic obstructive pulmonary disease, unspecified COPD type (CMS/HCC)    Moderate episode of recurrent major depressive disorder (CMS/HCC)    Ulcer of left second toe, limited to breakdown of skin (CMS/HCC)  -     gabapentin (NEURONTIN) 300 MG capsule; TAKE ONE CAPSULE BY MOUTH THREE TIMES A DAY    Other orders  -     metFORMIN (GLUCOPHAGE) 850 MG tablet; Take 1 tablet by mouth 2 (Two) Times a Day With Meals.  -     DULoxetine (CYMBALTA) 60 MG capsule; Take 1 capsule by mouth  Daily.  -     Insulin Glargine (BASAGLAR KWIKPEN) 100 UNIT/ML injection pen; Inject 15 Units under the skin Every Night.    The patient has read and signed the [facility] Controlled Substance Contract.  I will continue to see patient for regular follow up appointments. Patient is well controlled on the medication.  ANGELITO has been reviewed by me and is updated every 3 months. The patient is aware of the potential for addiction and dependence.     Continue Lorazepam when needed for anxiety    Continue gabapentin for diabetic neuropathy    Continue amlodipine for hypertension.      I encouraged her to get out with her friends and have some activity.  I think some of the fatigue is probably secondary to the depression and loneliness but she is had quite an extensive work-up for fatigue but we will continue to follow closely    Continue above medications for diabetes and testing blood sugars daily and when needed.    Continue Cymbalta for depression symptoms.    Follow-up with cardiology regarding SVT    Will refer to podiatry regarding the ulceration on the toe, recommend to try to keep it dressed and keep pressure off of it          This document has been electronically signed by Chantle Long MD on December 19, 2019 2:56 PM

## 2019-12-31 RX ORDER — AZITHROMYCIN 250 MG/1
TABLET, FILM COATED ORAL
Qty: 6 TABLET | Refills: 0 | Status: ON HOLD | OUTPATIENT
Start: 2019-12-31 | End: 2020-03-07

## 2020-01-02 ENCOUNTER — OFFICE VISIT (OUTPATIENT)
Dept: WOUND CARE | Facility: HOSPITAL | Age: 85
End: 2020-01-02

## 2020-01-02 LAB — GLUCOSE BLDC GLUCOMTR-MCNC: 132 MG/DL (ref 70–130)

## 2020-01-02 PROCEDURE — G0463 HOSPITAL OUTPT CLINIC VISIT: HCPCS

## 2020-01-02 PROCEDURE — 82962 GLUCOSE BLOOD TEST: CPT | Performed by: NURSE PRACTITIONER

## 2020-01-08 DIAGNOSIS — F41.9 ANXIETY: ICD-10-CM

## 2020-01-08 DIAGNOSIS — L97.521 ULCER OF LEFT SECOND TOE, LIMITED TO BREAKDOWN OF SKIN (HCC): ICD-10-CM

## 2020-01-08 RX ORDER — LORAZEPAM 1 MG/1
1 TABLET ORAL NIGHTLY PRN
Qty: 30 TABLET | Refills: 2 | OUTPATIENT
Start: 2020-01-08

## 2020-01-08 RX ORDER — GABAPENTIN 300 MG/1
CAPSULE ORAL
Qty: 90 CAPSULE | Refills: 2 | OUTPATIENT
Start: 2020-01-08

## 2020-01-08 RX ORDER — DULOXETIN HYDROCHLORIDE 60 MG/1
60 CAPSULE, DELAYED RELEASE ORAL DAILY
Qty: 90 CAPSULE | Refills: 1 | Status: SHIPPED | OUTPATIENT
Start: 2020-01-08 | End: 2020-06-15

## 2020-01-09 ENCOUNTER — APPOINTMENT (OUTPATIENT)
Dept: WOUND CARE | Facility: HOSPITAL | Age: 85
End: 2020-01-09

## 2020-01-14 ENCOUNTER — OUTSIDE FACILITY SERVICE (OUTPATIENT)
Dept: PODIATRY | Facility: CLINIC | Age: 85
End: 2020-01-14

## 2020-01-14 ENCOUNTER — OFFICE VISIT (OUTPATIENT)
Dept: WOUND CARE | Facility: HOSPITAL | Age: 85
End: 2020-01-14

## 2020-01-14 PROCEDURE — 97597 DBRDMT OPN WND 1ST 20 CM/<: CPT | Performed by: PODIATRIST

## 2020-01-21 ENCOUNTER — OFFICE VISIT (OUTPATIENT)
Dept: WOUND CARE | Facility: HOSPITAL | Age: 85
End: 2020-01-21

## 2020-01-21 ENCOUNTER — OUTSIDE FACILITY SERVICE (OUTPATIENT)
Dept: PODIATRY | Facility: CLINIC | Age: 85
End: 2020-01-21

## 2020-01-21 PROCEDURE — 11042 DBRDMT SUBQ TIS 1ST 20SQCM/<: CPT | Performed by: PODIATRIST

## 2020-02-04 ENCOUNTER — OFFICE VISIT (OUTPATIENT)
Dept: WOUND CARE | Facility: HOSPITAL | Age: 85
End: 2020-02-04

## 2020-02-04 ENCOUNTER — OUTSIDE FACILITY SERVICE (OUTPATIENT)
Dept: PODIATRY | Facility: CLINIC | Age: 85
End: 2020-02-04

## 2020-02-04 PROCEDURE — G0463 HOSPITAL OUTPT CLINIC VISIT: HCPCS

## 2020-02-04 PROCEDURE — 99212 OFFICE O/P EST SF 10 MIN: CPT | Performed by: PODIATRIST

## 2020-02-06 LAB
ALBUMIN SERPL-MCNC: 4.4 G/DL (ref 3.5–5)
ALBUMIN/GLOB SERPL: 1.3 G/DL (ref 1.1–1.8)
ALP SERPL-CCNC: 80 U/L (ref 38–126)
ALT SERPL W P-5'-P-CCNC: 17 U/L
ANION GAP SERPL CALCULATED.3IONS-SCNC: 9 MMOL/L (ref 5–15)
AST SERPL-CCNC: 22 U/L (ref 14–36)
BASOPHILS # BLD AUTO: 0.05 10*3/MM3 (ref 0–0.2)
BASOPHILS NFR BLD AUTO: 0.5 % (ref 0–1.5)
BILIRUB SERPL-MCNC: 0.9 MG/DL (ref 0.2–1.3)
BUN BLD-MCNC: 15 MG/DL (ref 7–23)
BUN/CREAT SERPL: 19.7 (ref 7–25)
CALCIUM SPEC-SCNC: 9.5 MG/DL (ref 8.4–10.2)
CHLORIDE SERPL-SCNC: 101 MMOL/L (ref 101–112)
CO2 SERPL-SCNC: 26 MMOL/L (ref 22–30)
CREAT BLD-MCNC: 0.76 MG/DL (ref 0.52–1.04)
DEPRECATED RDW RBC AUTO: 42.9 FL (ref 37–54)
EOSINOPHIL # BLD AUTO: 0.25 10*3/MM3 (ref 0–0.4)
EOSINOPHIL NFR BLD AUTO: 2.7 % (ref 0.3–6.2)
ERYTHROCYTE [DISTWIDTH] IN BLOOD BY AUTOMATED COUNT: 13 % (ref 12.3–15.4)
GFR SERPL CREATININE-BSD FRML MDRD: 73 ML/MIN/1.73 (ref 39–90)
GLOBULIN UR ELPH-MCNC: 3.4 GM/DL (ref 2.3–3.5)
GLUCOSE BLD-MCNC: 201 MG/DL (ref 70–99)
HCT VFR BLD AUTO: 40.7 % (ref 34–46.6)
HGB BLD-MCNC: 13.2 G/DL (ref 12–15.9)
LYMPHOCYTES # BLD AUTO: 4.18 10*3/MM3 (ref 0.7–3.1)
LYMPHOCYTES NFR BLD AUTO: 44.9 % (ref 19.6–45.3)
MCH RBC QN AUTO: 29.9 PG (ref 26.6–33)
MCHC RBC AUTO-ENTMCNC: 32.4 G/DL (ref 31.5–35.7)
MCV RBC AUTO: 92.1 FL (ref 79–97)
MONOCYTES # BLD AUTO: 0.91 10*3/MM3 (ref 0.1–0.9)
MONOCYTES NFR BLD AUTO: 9.8 % (ref 5–12)
NEUTROPHILS # BLD AUTO: 3.92 10*3/MM3 (ref 1.7–7)
NEUTROPHILS NFR BLD AUTO: 42.1 % (ref 42.7–76)
PLATELET # BLD AUTO: 369 10*3/MM3 (ref 140–450)
PMV BLD AUTO: 8.6 FL (ref 6–12)
POTASSIUM BLD-SCNC: 4.3 MMOL/L (ref 3.4–5)
PROT SERPL-MCNC: 7.8 G/DL (ref 6.3–8.6)
RBC # BLD AUTO: 4.42 10*6/MM3 (ref 3.77–5.28)
SODIUM BLD-SCNC: 136 MMOL/L (ref 137–145)
WBC NRBC COR # BLD: 9.31 10*3/MM3 (ref 3.4–10.8)

## 2020-02-06 PROCEDURE — 83036 HEMOGLOBIN GLYCOSYLATED A1C: CPT | Performed by: NURSE PRACTITIONER

## 2020-02-06 PROCEDURE — 82043 UR ALBUMIN QUANTITATIVE: CPT | Performed by: NURSE PRACTITIONER

## 2020-02-06 PROCEDURE — 80053 COMPREHEN METABOLIC PANEL: CPT | Performed by: NURSE PRACTITIONER

## 2020-02-06 PROCEDURE — 85025 COMPLETE CBC W/AUTO DIFF WBC: CPT | Performed by: NURSE PRACTITIONER

## 2020-02-06 PROCEDURE — 36415 COLL VENOUS BLD VENIPUNCTURE: CPT | Performed by: NURSE PRACTITIONER

## 2020-02-06 PROCEDURE — 82306 VITAMIN D 25 HYDROXY: CPT | Performed by: NURSE PRACTITIONER

## 2020-02-06 PROCEDURE — 84443 ASSAY THYROID STIM HORMONE: CPT | Performed by: NURSE PRACTITIONER

## 2020-02-06 PROCEDURE — 82570 ASSAY OF URINE CREATININE: CPT | Performed by: NURSE PRACTITIONER

## 2020-02-06 PROCEDURE — 82607 VITAMIN B-12: CPT | Performed by: NURSE PRACTITIONER

## 2020-02-06 PROCEDURE — 84156 ASSAY OF PROTEIN URINE: CPT | Performed by: NURSE PRACTITIONER

## 2020-02-07 LAB
25(OH)D3 SERPL-MCNC: 36.8 NG/ML (ref 30–100)
ALBUMIN UR-MCNC: 4.6 MG/DL
CREAT UR-MCNC: 170.3 MG/DL
CREAT UR-MCNC: 170.3 MG/DL
HBA1C MFR BLD: 7.8 % (ref 4.8–5.6)
MICROALBUMIN/CREAT UR: 27 MG/G
PROT UR-MCNC: 23 MG/DL
PROT/CREAT UR: 135.1 MG/G CREA (ref 0–200)
TSH SERPL DL<=0.05 MIU/L-ACNC: 0.98 UIU/ML (ref 0.27–4.2)
VIT B12 BLD-MCNC: 468 PG/ML (ref 211–946)

## 2020-02-11 ENCOUNTER — APPOINTMENT (OUTPATIENT)
Dept: WOUND CARE | Facility: HOSPITAL | Age: 85
End: 2020-02-11

## 2020-02-11 ENCOUNTER — OFFICE VISIT (OUTPATIENT)
Dept: PODIATRY | Facility: CLINIC | Age: 85
End: 2020-02-11

## 2020-02-11 VITALS — HEART RATE: 66 BPM | HEIGHT: 66 IN | BODY MASS INDEX: 28.61 KG/M2 | OXYGEN SATURATION: 96 % | WEIGHT: 178 LBS

## 2020-02-11 DIAGNOSIS — R09.89 DECREASED PEDAL PULSES: ICD-10-CM

## 2020-02-11 DIAGNOSIS — M20.42 HAMMERTOE OF SECOND TOE OF LEFT FOOT: Primary | ICD-10-CM

## 2020-02-11 PROCEDURE — 99213 OFFICE O/P EST LOW 20 MIN: CPT | Performed by: PODIATRIST

## 2020-02-11 NOTE — PROGRESS NOTES
Nyla Piña  1935  84 y.o. female   MIN [facility] - 12/19/2019  BS - 124 per pt    Patient presents today as a wound care center recheck and to schedule surgery on her left 2nd toe.    02/11/2020    Chief Complaint   Patient presents with   • Left Foot - Foot Ulcer       History of Present Illness    Nyla Piña is a 84 y.o.female presents to clinic as a wound care follow-up to discuss surgical intervention for her left foot.  Denies any open wounds at this time.  Rates her pain in the left foot as a 2 out of 10.  She is ambulating in a surgical shoe.  She denies any other lower extremity complaints today.    Past Medical History:   Diagnosis Date   • Acute bronchitis    • Acute sinusitis    • Acute upper respiratory infection    • Anxiety    • Cellulitis of lower leg    • Chronic urinary tract infection    • Cough    • Death of     • Diabetic asymmetric polyneuropathy (CMS/HCC)    • Diabetic peripheral neuropathy (CMS/HCC)    • Diabetic polyneuropathy (CMS/HCC)    • Disease of nail    • Dorsalgia    • Flank pain    • Foot ulcer (CMS/HCC)    • Hashimoto's thyroiditis    • History of echocardiogram 02/19/2013   • Hypertensive disorder    • Insomnia    • Mixed hyperlipidemia    • Non-healing surgical wound    • Osteoarthritis of multiple joints    • Peripheral vascular disease (CMS/HCC)    • Seasonal allergic rhinitis    • Type II diabetes mellitus, uncontrolled (CMS/HCC)    • Upper respiratory infection    • Urinary tract infectious disease    • Urinary tract infectious disease    • Vitamin D deficiency          Past Surgical History:   Procedure Laterality Date   • EXCISION MASS LEG Left 11/05/2013    Excision of soft tissue mass of left leg   • HYSTERECTOMY      Age 27   • STEROID INJECTION  02/08/2016    Depo Medrol (Methylprednisone) 80mg (Acute upper respiratory infection, unspecified)          History reviewed. No pertinent family history.    Allergies   Allergen Reactions   • Latex  Itching   • Penicillins Swelling and Rash       Social History     Socioeconomic History   • Marital status:      Spouse name: Not on file   • Number of children: Not on file   • Years of education: Not on file   • Highest education level: Not on file   Tobacco Use   • Smoking status: Never Smoker   • Smokeless tobacco: Never Used   Substance and Sexual Activity   • Alcohol use: No   • Drug use: No   • Sexual activity: Defer         Current Outpatient Medications   Medication Sig Dispense Refill   • albuterol (VENTOLIN HFA) 108 (90 Base) MCG/ACT inhaler Inhale 2 puffs Every 6 (Six) Hours As Needed for Wheezing or Shortness of Air. 1 inhaler 3   • amLODIPine (NORVASC) 10 MG tablet Take 1 tablet by mouth Daily. 30 tablet 4   • aspirin 81 MG tablet Take 81 mg by mouth daily.     • B-D ULTRAFINE III SHORT PEN 31G X 8 MM misc Use and discard 1 pen needle 4 times daily. 200 each 5   • Calcium Carbonate (CALCIUM 600 PO) Take 2 tablets by mouth daily.     • diclofenac (VOLTAREN) 1 % gel gel Apply 4 g topically 4 (Four) Times a Day. 100 g 11   • DULoxetine (CYMBALTA) 60 MG capsule TAKE 1 CAPSULE BY MOUTH DAILY. 90 capsule 1   • famciclovir (FAMVIR) 250 MG tablet Take 1 tablet by mouth 3 (Three) Times a Day. 15 tablet 0   • fluticasone (FLONASE) 50 MCG/ACT nasal spray SPRAY 1 SPRAY INTO EACH NOSTRIL BIDM 16 g 5   • gabapentin (NEURONTIN) 300 MG capsule TAKE ONE CAPSULE BY MOUTH THREE TIMES A DAY 90 capsule 2   • HYDROcodone-acetaminophen (NORCO) 7.5-325 MG per tablet Take 1 tablet by mouth 2 (Two) Times a Day. 60 tablet 0   • insulin aspart (novoLOG FLEXPEN) 100 UNIT/ML solution pen-injector sc pen Take 15 units TID before each meal 15 pen 3   • Insulin Glargine (BASAGLAR KWIKPEN) 100 UNIT/ML injection pen Inject 15 Units under the skin Every Night. 5 pen 5   • irbesartan (AVAPRO) 150 MG tablet TAKE 1 TABLET BY MOUTH EVERY NIGHT. 30 tablet 5   • levothyroxine (SYNTHROID, LEVOTHROID) 100 MCG tablet TAKE 1 TABLET DAILY  "90 tablet 1   • LORazepam (ATIVAN) 1 MG tablet Take 1 tablet by mouth At Night As Needed for Anxiety or Sleep. 30 tablet 2   • metFORMIN (GLUCOPHAGE) 850 MG tablet Take 1 tablet by mouth 2 (Two) Times a Day With Meals. 180 tablet 1   • metoprolol succinate XL (TOPROL-XL) 25 MG 24 hr tablet Take 1 tablet by mouth Daily. 90 tablet 3   • montelukast (SINGULAIR) 10 MG tablet TAKE 1 TABLET BY MOUTH EVERY NIGHT  DAYS. 30 tablet 5   • Needle, Disp, 31G X 5/16\" misc 1 each 4 (Four) Times a Day. Pen Needle; 400 each 3   • TRUE METRIX BLOOD GLUCOSE TEST test strip USE TO TEST FOUR TIMES DAILY 150 each 11   • TRUEPLUS LANCETS 28G misc TEST FOUR TIMES A  each 11   • vitamin D (ERGOCALCIFEROL) 1.25 MG (47421 UT) capsule capsule TAKE ONE CAPSULE BY MOUTH ONCE WEEKLY 12 capsule 11   • azithromycin (ZITHROMAX Z-MELINA) 250 MG tablet Take 2 tablets the first day, then 1 tablet daily for 4 days. 6 tablet 0   • montelukast (SINGULAIR) 10 MG tablet   6   • sulfamethoxazole-trimethoprim (BACTRIM DS) 800-160 MG per tablet Take 1 tablet by mouth 2 (Two) Times a Day. 20 tablet 0     Current Facility-Administered Medications   Medication Dose Route Frequency Provider Last Rate Last Dose   • cyanocobalamin injection 1,000 mcg  1,000 mcg Intramuscular Q28 Days Chantel Long MD   1,000 mcg at 03/07/19 1145       Review of Systems   Constitutional: Negative for chills and fever.   HENT: Negative for congestion and facial swelling.    Eyes: Negative.    Respiratory: Negative for cough and shortness of breath.    Cardiovascular: Negative for chest pain and leg swelling.   Gastrointestinal: Negative.    Musculoskeletal:        Left foot pain  Toe deformity   Skin: Negative.    Neurological: Negative.    Psychiatric/Behavioral: Negative.          OBJECTIVE    Pulse 66   Ht 167.6 cm (66\")   Wt 80.7 kg (178 lb)   SpO2 96%   BMI 28.73 kg/m²       Physical Exam   Constitutional: She is oriented to person, place, and time. She appears " well-developed and well-nourished. No distress.   HENT:   Head: Normocephalic and atraumatic.   Nose: Nose normal.   Eyes: Pupils are equal, round, and reactive to light. Conjunctivae and EOM are normal.   Pulmonary/Chest: Effort normal. No respiratory distress. She has no wheezes.   Musculoskeletal: Normal range of motion. She exhibits tenderness and deformity. She exhibits no edema.   Neurological: She is alert and oriented to person, place, and time.   Skin: Skin is warm and dry. Capillary refill takes less than 2 seconds.   Psychiatric: She has a normal mood and affect. Her behavior is normal.   Vitals reviewed.      Gait: Normal    Assistive Device: Walker    Left lower Extremity    Cardiovascular:    DP/PT pulses non-palpable bilateral  CFT brisk  to all digits  Skin temp is warm to warm from proximal tibia to distal digits bilateral  Pedal hair growth absent.   No erythema or edema noted   Musculoskeletal:  Muscle strength is 5/5 for all muscle groups tested   ROM of the 1st MTP is WNL   ROM of the ankle joint is  WNL   Rigidly contracted second digit  Reducibly contracted third, fourth and fifth digits  Slight pain on palpation to the plantar second and third metatarsal heads  Dermatological:   Skin is warm, dry and intact    Webspaces 1-4  are clean, dry and intact.   No subcutaneous nodules or masses noted    Hyperkeratotic tissue noted to dorsal second digit  Neurological:   Sensation intact to light touch        Procedures        ASSESSMENT AND PLAN    Nyla was seen today for foot ulcer.    Diagnoses and all orders for this visit:    Hammertoe of second toe of left foot  -     XR Foot 3+ View Left    Decreased pedal pulses  -     Doppler Ankle Brachial Index Single Level CAR; Future      - Comprehensive foot and ankle exam performed.   -Radiographs taken and reviewed.  Contracted second digit.  -Lengthy discussion held with patient regarding surgical treatment options.  We will send patient for  noninvasive vascular testing prior to scheduling surgery.  - All questions were answered to the patients satisfaction.  - RTC after noninvasive vascular testing for scheduling of left foot surgery.          This document has been electronically signed by Wood Flores DPM on February 11, 2020 2:10 PM     2/11/2020  2:10 PM

## 2020-02-26 ENCOUNTER — OFFICE VISIT (OUTPATIENT)
Dept: PODIATRY | Facility: CLINIC | Age: 85
End: 2020-02-26

## 2020-02-26 VITALS — WEIGHT: 178 LBS | HEART RATE: 56 BPM | OXYGEN SATURATION: 98 % | HEIGHT: 66 IN | BODY MASS INDEX: 28.61 KG/M2

## 2020-02-26 DIAGNOSIS — M77.42 METATARSALGIA OF LEFT FOOT: ICD-10-CM

## 2020-02-26 DIAGNOSIS — M20.42 HAMMERTOE OF SECOND TOE OF LEFT FOOT: Primary | ICD-10-CM

## 2020-02-26 DIAGNOSIS — M20.5X2 TOE CONTRACTURE, LEFT: ICD-10-CM

## 2020-02-26 PROCEDURE — 99214 OFFICE O/P EST MOD 30 MIN: CPT | Performed by: PODIATRIST

## 2020-02-26 NOTE — PROGRESS NOTES
Nyla Piña  1935  84 y.o. female   MIN [facility]Ethan - 12/19/2019 next appointment is 3/9/2020  BS - 140 per pt    Patient presents today to discuss surgery and LACY results     02/26/2020     Chief Complaint   Patient presents with   • Left Foot - Follow-up       History of Present Illness    Nyla Piña is a 84 y.o.female presents to clinic today for follow up.  Patient has had her noninvasive vascular studies.  She is ready to proceed with surgical correction.  Denies any new complaints.    Past Medical History:   Diagnosis Date   • Acute bronchitis    • Acute sinusitis    • Acute upper respiratory infection    • Anxiety    • Cellulitis of lower leg    • Chronic urinary tract infection    • Cough    • Death of     • Diabetic asymmetric polyneuropathy (CMS/HCC)    • Diabetic peripheral neuropathy (CMS/HCC)    • Diabetic polyneuropathy (CMS/HCC)    • Disease of nail    • Dorsalgia    • Flank pain    • Foot ulcer (CMS/HCC)    • Hashimoto's thyroiditis    • History of echocardiogram 02/19/2013   • Hypertensive disorder    • Insomnia    • Mixed hyperlipidemia    • Non-healing surgical wound    • Osteoarthritis of multiple joints    • Peripheral vascular disease (CMS/HCC)    • Seasonal allergic rhinitis    • Type II diabetes mellitus, uncontrolled (CMS/HCC)    • Upper respiratory infection    • Urinary tract infectious disease    • Urinary tract infectious disease    • Vitamin D deficiency          Past Surgical History:   Procedure Laterality Date   • EXCISION MASS LEG Left 11/05/2013    Excision of soft tissue mass of left leg   • HYSTERECTOMY      Age 27   • STEROID INJECTION  02/08/2016    Depo Medrol (Methylprednisone) 80mg (Acute upper respiratory infection, unspecified)          History reviewed. No pertinent family history.    Allergies   Allergen Reactions   • Latex Itching   • Penicillins Swelling and Rash       Social History     Socioeconomic History   • Marital status:       Spouse name: Not on file   • Number of children: Not on file   • Years of education: Not on file   • Highest education level: Not on file   Tobacco Use   • Smoking status: Never Smoker   • Smokeless tobacco: Never Used   Substance and Sexual Activity   • Alcohol use: No   • Drug use: No   • Sexual activity: Defer         Current Outpatient Medications   Medication Sig Dispense Refill   • albuterol (VENTOLIN HFA) 108 (90 Base) MCG/ACT inhaler Inhale 2 puffs Every 6 (Six) Hours As Needed for Wheezing or Shortness of Air. 1 inhaler 3   • amLODIPine (NORVASC) 10 MG tablet Take 1 tablet by mouth Daily. 30 tablet 4   • aspirin 81 MG tablet Take 81 mg by mouth daily.     • B-D ULTRAFINE III SHORT PEN 31G X 8 MM misc Use and discard 1 pen needle 4 times daily. 200 each 5   • Calcium Carbonate (CALCIUM 600 PO) Take 2 tablets by mouth daily.     • diclofenac (VOLTAREN) 1 % gel gel Apply 4 g topically 4 (Four) Times a Day. 100 g 11   • DULoxetine (CYMBALTA) 60 MG capsule TAKE 1 CAPSULE BY MOUTH DAILY. 90 capsule 1   • famciclovir (FAMVIR) 250 MG tablet Take 1 tablet by mouth 3 (Three) Times a Day. 15 tablet 0   • fluticasone (FLONASE) 50 MCG/ACT nasal spray SPRAY 1 SPRAY INTO EACH NOSTRIL BIDM 16 g 5   • gabapentin (NEURONTIN) 300 MG capsule TAKE ONE CAPSULE BY MOUTH THREE TIMES A DAY 90 capsule 2   • insulin aspart (novoLOG FLEXPEN) 100 UNIT/ML solution pen-injector sc pen Take 15 units TID before each meal 15 pen 3   • Insulin Glargine (BASAGLAR KWIKPEN) 100 UNIT/ML injection pen Inject 15 Units under the skin Every Night. 5 pen 5   • irbesartan (AVAPRO) 150 MG tablet TAKE 1 TABLET BY MOUTH EVERY NIGHT. 30 tablet 5   • levothyroxine (SYNTHROID, LEVOTHROID) 100 MCG tablet TAKE 1 TABLET DAILY 90 tablet 1   • LORazepam (ATIVAN) 1 MG tablet Take 1 tablet by mouth At Night As Needed for Anxiety or Sleep. 30 tablet 2   • metFORMIN (GLUCOPHAGE) 850 MG tablet Take 1 tablet by mouth 2 (Two) Times a Day With Meals. 180 tablet 1   •  "metoprolol succinate XL (TOPROL-XL) 25 MG 24 hr tablet Take 1 tablet by mouth Daily. 90 tablet 3   • montelukast (SINGULAIR) 10 MG tablet   6   • montelukast (SINGULAIR) 10 MG tablet TAKE 1 TABLET BY MOUTH EVERY NIGHT  DAYS. 30 tablet 5   • Needle, Disp, 31G X 5/16\" misc 1 each 4 (Four) Times a Day. Pen Needle; 400 each 3   • TRUE METRIX BLOOD GLUCOSE TEST test strip USE TO TEST FOUR TIMES DAILY 150 each 11   • TRUEPLUS LANCETS 28G misc TEST FOUR TIMES A  each 11   • vitamin D (ERGOCALCIFEROL) 1.25 MG (02126 UT) capsule capsule TAKE ONE CAPSULE BY MOUTH ONCE WEEKLY 12 capsule 11   • azithromycin (ZITHROMAX Z-MELINA) 250 MG tablet Take 2 tablets the first day, then 1 tablet daily for 4 days. 6 tablet 0   • HYDROcodone-acetaminophen (NORCO) 7.5-325 MG per tablet Take 1 tablet by mouth 2 (Two) Times a Day. 60 tablet 0   • sulfamethoxazole-trimethoprim (BACTRIM DS) 800-160 MG per tablet Take 1 tablet by mouth 2 (Two) Times a Day. 20 tablet 0     Current Facility-Administered Medications   Medication Dose Route Frequency Provider Last Rate Last Dose   • cyanocobalamin injection 1,000 mcg  1,000 mcg Intramuscular Q28 Days Chantel Long MD   1,000 mcg at 03/07/19 1145       Review of Systems   Constitutional: Negative for chills and fever.   HENT: Negative for congestion and facial swelling.    Eyes: Negative.    Respiratory: Negative for cough and shortness of breath.    Cardiovascular: Negative for chest pain and leg swelling.   Gastrointestinal: Negative.    Musculoskeletal:        Left foot pain  Toe deformity   Skin: Negative.    Neurological: Negative.    Psychiatric/Behavioral: Negative.          OBJECTIVE    Pulse 56   Ht 167.6 cm (66\")   Wt 80.7 kg (178 lb)   SpO2 98%   BMI 28.73 kg/m²       Physical Exam   Constitutional: She is oriented to person, place, and time. She appears well-developed and well-nourished. No distress.   HENT:   Head: Normocephalic and atraumatic.   Nose: Nose normal. "   Eyes: Pupils are equal, round, and reactive to light. Conjunctivae and EOM are normal.   Pulmonary/Chest: Effort normal. No respiratory distress. She has no wheezes.   Musculoskeletal: Normal range of motion. She exhibits tenderness and deformity. She exhibits no edema.   Neurological: She is alert and oriented to person, place, and time.   Skin: Skin is warm and dry. Capillary refill takes less than 2 seconds.   Psychiatric: She has a normal mood and affect. Her behavior is normal.   Vitals reviewed.      Gait: Normal    Assistive Device: Walker    Left lower Extremity    Cardiovascular:    DP/PT pulses non-palpable bilateral  CFT brisk  to all digits  Skin temp is warm to warm from proximal tibia to distal digits bilateral  Pedal hair growth absent.   No erythema or edema noted   Musculoskeletal:  Muscle strength is 5/5 for all muscle groups tested   ROM of the 1st MTP is WNL   ROM of the ankle joint is  WNL   Rigidly contracted second digit  Reducibly contracted third, fourth and fifth digits  Slight pain on palpation to the plantar second and third metatarsal heads  Dermatological:   Skin is warm, dry and intact    Webspaces 1-4  are clean, dry and intact.   No subcutaneous nodules or masses noted    Hyperkeratotic tissue noted to dorsal second digit  Neurological:   Sensation intact to light touch        Procedures        ASSESSMENT AND PLAN    Nyla was seen today for follow-up.    Diagnoses and all orders for this visit:    Hammertoe of second toe of left foot  -     Case Request; Standing    Metatarsalgia of left foot    Toe contracture, left    Other orders  -     Follow Anesthesia Guidelines / Standing Orders; Future      -Noninvasive vascular studies reviewed.  Plan to proceed with surgical intervention.  Risks and benefits of the surgery were discussed in detail.  No guarantees were given or implied.  Surgical plan is for hammertoe correction left second digit with possible second and third  metatarsal osteotomies and all other indicated procedures.  Tentative date for surgery is March 12, 2020.  - All questions were answered to the patients satisfaction.  -RTC after surgery     I spent 30 minutes face-to-face this patient discussing surgical treatment options.  Surgery was scheduled during this visit.              This document has been electronically signed by Wood Flores DPM on February 26, 2020 3:37 PM     2/26/2020  3:37 PM

## 2020-02-27 PROBLEM — M20.42 HAMMERTOE OF SECOND TOE OF LEFT FOOT: Status: ACTIVE | Noted: 2020-02-27

## 2020-03-02 DIAGNOSIS — R30.0 DYSURIA: Primary | ICD-10-CM

## 2020-03-04 LAB
BACTERIA UR QL AUTO: ABNORMAL /HPF
BILIRUB UR QL STRIP: NEGATIVE
CLARITY UR: ABNORMAL
COLOR UR: YELLOW
GLUCOSE UR STRIP-MCNC: NEGATIVE MG/DL
HGB UR QL STRIP.AUTO: ABNORMAL
HYALINE CASTS UR QL AUTO: ABNORMAL /LPF
KETONES UR QL STRIP: NEGATIVE
LEUKOCYTE ESTERASE UR QL STRIP.AUTO: ABNORMAL
NITRITE UR QL STRIP: NEGATIVE
PH UR STRIP.AUTO: 7.5 [PH] (ref 5.5–8)
PROT UR QL STRIP: NEGATIVE
RBC # UR: ABNORMAL /HPF
REF LAB TEST METHOD: ABNORMAL
SP GR UR STRIP: 1.02 (ref 1–1.03)
SQUAMOUS #/AREA URNS HPF: ABNORMAL /HPF
UROBILINOGEN UR QL STRIP: ABNORMAL
WBC CLUMPS # UR AUTO: ABNORMAL /HPF
WBC UR QL AUTO: ABNORMAL /HPF

## 2020-03-04 PROCEDURE — 81001 URINALYSIS AUTO W/SCOPE: CPT | Performed by: FAMILY MEDICINE

## 2020-03-04 PROCEDURE — 87086 URINE CULTURE/COLONY COUNT: CPT | Performed by: FAMILY MEDICINE

## 2020-03-04 PROCEDURE — 87186 SC STD MICRODIL/AGAR DIL: CPT | Performed by: FAMILY MEDICINE

## 2020-03-04 RX ORDER — SULFAMETHOXAZOLE AND TRIMETHOPRIM 800; 160 MG/1; MG/1
1 TABLET ORAL 2 TIMES DAILY
Qty: 20 TABLET | Refills: 0 | Status: SHIPPED | OUTPATIENT
Start: 2020-03-04 | End: 2020-03-10 | Stop reason: HOSPADM

## 2020-03-04 NOTE — PROGRESS NOTES
Please call the patient regarding her abnormal result.  Let her know I sent in another antibiotic for UTI

## 2020-03-05 ENCOUNTER — APPOINTMENT (OUTPATIENT)
Dept: PREADMISSION TESTING | Facility: HOSPITAL | Age: 85
End: 2020-03-05

## 2020-03-06 LAB — BACTERIA SPEC AEROBE CULT: ABNORMAL

## 2020-03-07 ENCOUNTER — APPOINTMENT (OUTPATIENT)
Dept: CARDIOLOGY | Facility: HOSPITAL | Age: 85
End: 2020-03-07

## 2020-03-07 ENCOUNTER — HOSPITAL ENCOUNTER (INPATIENT)
Facility: HOSPITAL | Age: 85
LOS: 3 days | Discharge: HOME HEALTH | End: 2020-03-10
Attending: HOSPITALIST | Admitting: HOSPITALIST

## 2020-03-07 DIAGNOSIS — I48.91 ATRIAL FIBRILLATION WITH RVR (HCC): Primary | ICD-10-CM

## 2020-03-07 LAB
ANION GAP SERPL CALCULATED.3IONS-SCNC: 14 MMOL/L (ref 5–15)
AORTIC DIMENSIONLESS INDEX: 0.3 (DI)
BASOPHILS # BLD AUTO: 0.03 10*3/MM3 (ref 0–0.2)
BASOPHILS NFR BLD AUTO: 0.3 % (ref 0–1.5)
BH CV ECHO MEAS - AO MAX PG (FULL): 35.5 MMHG
BH CV ECHO MEAS - AO MAX PG: 37.9 MMHG
BH CV ECHO MEAS - AO MEAN PG (FULL): 23 MMHG
BH CV ECHO MEAS - AO MEAN PG: 25 MMHG
BH CV ECHO MEAS - AO ROOT AREA (BSA CORRECTED): 2
BH CV ECHO MEAS - AO ROOT AREA: 11.3 CM^2
BH CV ECHO MEAS - AO ROOT DIAM: 3.8 CM
BH CV ECHO MEAS - AO V2 MAX: 308 CM/SEC
BH CV ECHO MEAS - AO V2 MEAN: 242 CM/SEC
BH CV ECHO MEAS - AO V2 VTI: 69.4 CM
BH CV ECHO MEAS - ASC AORTA: 3.6 CM
BH CV ECHO MEAS - AVA(I,A): 1.1 CM^2
BH CV ECHO MEAS - AVA(I,D): 1.1 CM^2
BH CV ECHO MEAS - AVA(V,A): 1.2 CM^2
BH CV ECHO MEAS - AVA(V,D): 1.2 CM^2
BH CV ECHO MEAS - BSA(HAYCOCK): 1.9 M^2
BH CV ECHO MEAS - BSA: 1.9 M^2
BH CV ECHO MEAS - BZI_BMI: 27.1 KILOGRAMS/M^2
BH CV ECHO MEAS - BZI_METRIC_HEIGHT: 170.2 CM
BH CV ECHO MEAS - BZI_METRIC_WEIGHT: 78.5 KG
BH CV ECHO MEAS - EDV(CUBED): 46.7 ML
BH CV ECHO MEAS - EDV(MOD-SP2): 45.6 ML
BH CV ECHO MEAS - EDV(MOD-SP4): 48 ML
BH CV ECHO MEAS - EDV(TEICH): 54.4 ML
BH CV ECHO MEAS - EF(CUBED): 58.7 %
BH CV ECHO MEAS - EF(MOD-SP2): 56.8 %
BH CV ECHO MEAS - EF(MOD-SP4): 59.2 %
BH CV ECHO MEAS - EF(TEICH): 51.3 %
BH CV ECHO MEAS - ESV(CUBED): 19.2 ML
BH CV ECHO MEAS - ESV(MOD-SP2): 19.7 ML
BH CV ECHO MEAS - ESV(MOD-SP4): 19.6 ML
BH CV ECHO MEAS - ESV(TEICH): 26.5 ML
BH CV ECHO MEAS - FS: 25.6 %
BH CV ECHO MEAS - IVS/LVPW: 1.1
BH CV ECHO MEAS - IVSD: 1.3 CM
BH CV ECHO MEAS - LA DIMENSION: 4.2 CM
BH CV ECHO MEAS - LA/AO: 1.1
BH CV ECHO MEAS - LV DIASTOLIC VOL/BSA (35-75): 25.2 ML/M^2
BH CV ECHO MEAS - LV MASS(C)D: 155.3 GRAMS
BH CV ECHO MEAS - LV MASS(C)DI: 81.7 GRAMS/M^2
BH CV ECHO MEAS - LV MAX PG: 2.5 MMHG
BH CV ECHO MEAS - LV MEAN PG: 2 MMHG
BH CV ECHO MEAS - LV SYSTOLIC VOL/BSA (12-30): 10.3 ML/M^2
BH CV ECHO MEAS - LV V1 MAX: 78.3 CM/SEC
BH CV ECHO MEAS - LV V1 MEAN: 62.6 CM/SEC
BH CV ECHO MEAS - LV V1 VTI: 17.1 CM
BH CV ECHO MEAS - LVIDD: 3.6 CM
BH CV ECHO MEAS - LVIDS: 2.7 CM
BH CV ECHO MEAS - LVLD AP2: 6.7 CM
BH CV ECHO MEAS - LVLD AP4: 6.5 CM
BH CV ECHO MEAS - LVLS AP2: 5.7 CM
BH CV ECHO MEAS - LVLS AP4: 5.7 CM
BH CV ECHO MEAS - LVOT AREA (M): 4.5 CM^2
BH CV ECHO MEAS - LVOT AREA: 4.5 CM^2
BH CV ECHO MEAS - LVOT DIAM: 2.4 CM
BH CV ECHO MEAS - LVPWD: 1.2 CM
BH CV ECHO MEAS - MR MAX PG: 103.2 MMHG
BH CV ECHO MEAS - MR MAX VEL: 508 CM/SEC
BH CV ECHO MEAS - MV DEC SLOPE: 491 CM/SEC^2
BH CV ECHO MEAS - MV E MAX VEL: 100 CM/SEC
BH CV ECHO MEAS - MV P1/2T MAX VEL: 129 CM/SEC
BH CV ECHO MEAS - MV P1/2T: 77 MSEC
BH CV ECHO MEAS - MVA P1/2T LCG: 1.7 CM^2
BH CV ECHO MEAS - MVA(P1/2T): 2.9 CM^2
BH CV ECHO MEAS - PA MAX PG (FULL): 0.98 MMHG
BH CV ECHO MEAS - PA MAX PG: 2.7 MMHG
BH CV ECHO MEAS - PA V2 MAX: 81.4 CM/SEC
BH CV ECHO MEAS - RAP SYSTOLE: 15 MMHG
BH CV ECHO MEAS - RV MAX PG: 1.7 MMHG
BH CV ECHO MEAS - RV MEAN PG: 1 MMHG
BH CV ECHO MEAS - RV V1 MAX: 64.6 CM/SEC
BH CV ECHO MEAS - RV V1 MEAN: 49 CM/SEC
BH CV ECHO MEAS - RV V1 VTI: 13.2 CM
BH CV ECHO MEAS - RVDD: 3 CM
BH CV ECHO MEAS - RVSP: 41.4 MMHG
BH CV ECHO MEAS - SI(AO): 414 ML/M^2
BH CV ECHO MEAS - SI(CUBED): 14.4 ML/M^2
BH CV ECHO MEAS - SI(LVOT): 40.7 ML/M^2
BH CV ECHO MEAS - SI(MOD-SP2): 13.6 ML/M^2
BH CV ECHO MEAS - SI(MOD-SP4): 14.9 ML/M^2
BH CV ECHO MEAS - SI(TEICH): 14.7 ML/M^2
BH CV ECHO MEAS - SV(AO): 787.1 ML
BH CV ECHO MEAS - SV(CUBED): 27.4 ML
BH CV ECHO MEAS - SV(LVOT): 77.4 ML
BH CV ECHO MEAS - SV(MOD-SP2): 25.9 ML
BH CV ECHO MEAS - SV(MOD-SP4): 28.4 ML
BH CV ECHO MEAS - SV(TEICH): 27.9 ML
BH CV ECHO MEAS - TR MAX VEL: 257 CM/SEC
BUN BLD-MCNC: 13 MG/DL (ref 8–23)
BUN/CREAT SERPL: 14.3 (ref 7–25)
CA-I BLD-MCNC: 4.56 MG/DL (ref 4.6–5.6)
CALCIUM SPEC-SCNC: 8.8 MG/DL (ref 8.6–10.5)
CHLORIDE SERPL-SCNC: 100 MMOL/L (ref 98–107)
CO2 SERPL-SCNC: 21 MMOL/L (ref 22–29)
CREAT BLD-MCNC: 0.91 MG/DL (ref 0.57–1)
DEPRECATED RDW RBC AUTO: 43.1 FL (ref 37–54)
EOSINOPHIL # BLD AUTO: 0.1 10*3/MM3 (ref 0–0.4)
EOSINOPHIL NFR BLD AUTO: 1 % (ref 0.3–6.2)
ERYTHROCYTE [DISTWIDTH] IN BLOOD BY AUTOMATED COUNT: 13.3 % (ref 12.3–15.4)
GFR SERPL CREATININE-BSD FRML MDRD: 59 ML/MIN/1.73
GLUCOSE BLD-MCNC: 212 MG/DL (ref 65–99)
GLUCOSE BLDC GLUCOMTR-MCNC: 109 MG/DL (ref 70–130)
GLUCOSE BLDC GLUCOMTR-MCNC: 235 MG/DL (ref 70–130)
HCT VFR BLD AUTO: 32.3 % (ref 34–46.6)
HGB BLD-MCNC: 10.6 G/DL (ref 12–15.9)
IMM GRANULOCYTES # BLD AUTO: 0.07 10*3/MM3 (ref 0–0.05)
IMM GRANULOCYTES NFR BLD AUTO: 0.7 % (ref 0–0.5)
LYMPHOCYTES # BLD AUTO: 1.66 10*3/MM3 (ref 0.7–3.1)
LYMPHOCYTES NFR BLD AUTO: 16.8 % (ref 19.6–45.3)
MAGNESIUM SERPL-MCNC: 1.9 MG/DL (ref 1.6–2.4)
MCH RBC QN AUTO: 29.1 PG (ref 26.6–33)
MCHC RBC AUTO-ENTMCNC: 32.8 G/DL (ref 31.5–35.7)
MCV RBC AUTO: 88.7 FL (ref 79–97)
MONOCYTES # BLD AUTO: 1.26 10*3/MM3 (ref 0.1–0.9)
MONOCYTES NFR BLD AUTO: 12.7 % (ref 5–12)
NEUTROPHILS # BLD AUTO: 6.77 10*3/MM3 (ref 1.7–7)
NEUTROPHILS NFR BLD AUTO: 68.5 % (ref 42.7–76)
NRBC BLD AUTO-RTO: 0 /100 WBC (ref 0–0.2)
PHOSPHATE SERPL-MCNC: 2.5 MG/DL (ref 2.5–4.5)
PLATELET # BLD AUTO: 271 10*3/MM3 (ref 140–450)
PMV BLD AUTO: 8.8 FL (ref 6–12)
POTASSIUM BLD-SCNC: 5.1 MMOL/L (ref 3.5–5.2)
RBC # BLD AUTO: 3.64 10*6/MM3 (ref 3.77–5.28)
SODIUM BLD-SCNC: 135 MMOL/L (ref 136–145)
WBC NRBC COR # BLD: 9.89 10*3/MM3 (ref 3.4–10.8)

## 2020-03-07 PROCEDURE — 25010000002 ENOXAPARIN PER 10 MG: Performed by: HOSPITALIST

## 2020-03-07 PROCEDURE — 85025 COMPLETE CBC W/AUTO DIFF WBC: CPT | Performed by: HOSPITALIST

## 2020-03-07 PROCEDURE — 84100 ASSAY OF PHOSPHORUS: CPT | Performed by: HOSPITALIST

## 2020-03-07 PROCEDURE — 80048 BASIC METABOLIC PNL TOTAL CA: CPT | Performed by: HOSPITALIST

## 2020-03-07 PROCEDURE — 83735 ASSAY OF MAGNESIUM: CPT | Performed by: HOSPITALIST

## 2020-03-07 PROCEDURE — 82330 ASSAY OF CALCIUM: CPT | Performed by: HOSPITALIST

## 2020-03-07 PROCEDURE — 93306 TTE W/DOPPLER COMPLETE: CPT

## 2020-03-07 PROCEDURE — 63710000001 INSULIN ASPART PER 5 UNITS: Performed by: HOSPITALIST

## 2020-03-07 PROCEDURE — 93306 TTE W/DOPPLER COMPLETE: CPT | Performed by: INTERNAL MEDICINE

## 2020-03-07 PROCEDURE — 82962 GLUCOSE BLOOD TEST: CPT

## 2020-03-07 RX ORDER — SODIUM CHLORIDE 0.9 % (FLUSH) 0.9 %
10 SYRINGE (ML) INJECTION EVERY 12 HOURS SCHEDULED
Status: DISCONTINUED | OUTPATIENT
Start: 2020-03-07 | End: 2020-03-10 | Stop reason: HOSPADM

## 2020-03-07 RX ORDER — GABAPENTIN 100 MG/1
100 CAPSULE ORAL EVERY 8 HOURS SCHEDULED
Status: DISCONTINUED | OUTPATIENT
Start: 2020-03-07 | End: 2020-03-10 | Stop reason: HOSPADM

## 2020-03-07 RX ORDER — AMLODIPINE BESYLATE 10 MG/1
10 TABLET ORAL DAILY
Status: DISCONTINUED | OUTPATIENT
Start: 2020-03-07 | End: 2020-03-10 | Stop reason: HOSPADM

## 2020-03-07 RX ORDER — HYDROCODONE BITARTRATE AND ACETAMINOPHEN 7.5; 325 MG/1; MG/1
1 TABLET ORAL
Status: DISCONTINUED | OUTPATIENT
Start: 2020-03-07 | End: 2020-03-10 | Stop reason: HOSPADM

## 2020-03-07 RX ORDER — DEXTROSE MONOHYDRATE 25 G/50ML
25 INJECTION, SOLUTION INTRAVENOUS
Status: DISCONTINUED | OUTPATIENT
Start: 2020-03-07 | End: 2020-03-10 | Stop reason: HOSPADM

## 2020-03-07 RX ORDER — MONTELUKAST SODIUM 10 MG/1
10 TABLET ORAL NIGHTLY
Status: DISCONTINUED | OUTPATIENT
Start: 2020-03-07 | End: 2020-03-10 | Stop reason: HOSPADM

## 2020-03-07 RX ORDER — LEVOTHYROXINE SODIUM 0.1 MG/1
100 TABLET ORAL DAILY
Status: DISCONTINUED | OUTPATIENT
Start: 2020-03-08 | End: 2020-03-10 | Stop reason: HOSPADM

## 2020-03-07 RX ORDER — ASPIRIN 81 MG/1
81 TABLET ORAL DAILY
Status: DISCONTINUED | OUTPATIENT
Start: 2020-03-07 | End: 2020-03-10 | Stop reason: HOSPADM

## 2020-03-07 RX ORDER — ATORVASTATIN CALCIUM 10 MG/1
10 TABLET, FILM COATED ORAL DAILY
Status: DISCONTINUED | OUTPATIENT
Start: 2020-03-07 | End: 2020-03-10 | Stop reason: HOSPADM

## 2020-03-07 RX ORDER — SIMVASTATIN 10 MG
20 TABLET ORAL DAILY
COMMUNITY

## 2020-03-07 RX ORDER — SODIUM CHLORIDE 0.9 % (FLUSH) 0.9 %
10 SYRINGE (ML) INJECTION AS NEEDED
Status: DISCONTINUED | OUTPATIENT
Start: 2020-03-07 | End: 2020-03-10 | Stop reason: HOSPADM

## 2020-03-07 RX ORDER — NICOTINE POLACRILEX 4 MG
15 LOZENGE BUCCAL
Status: DISCONTINUED | OUTPATIENT
Start: 2020-03-07 | End: 2020-03-10 | Stop reason: HOSPADM

## 2020-03-07 RX ORDER — DULOXETIN HYDROCHLORIDE 60 MG/1
60 CAPSULE, DELAYED RELEASE ORAL DAILY
Status: DISCONTINUED | OUTPATIENT
Start: 2020-03-08 | End: 2020-03-10 | Stop reason: HOSPADM

## 2020-03-07 RX ORDER — METOPROLOL SUCCINATE 25 MG/1
25 TABLET, EXTENDED RELEASE ORAL DAILY
Status: DISCONTINUED | OUTPATIENT
Start: 2020-03-08 | End: 2020-03-09

## 2020-03-07 RX ORDER — LOSARTAN POTASSIUM 50 MG/1
50 TABLET ORAL
Status: DISCONTINUED | OUTPATIENT
Start: 2020-03-07 | End: 2020-03-10 | Stop reason: HOSPADM

## 2020-03-07 RX ORDER — LORAZEPAM 1 MG/1
1 TABLET ORAL NIGHTLY PRN
Status: DISCONTINUED | OUTPATIENT
Start: 2020-03-07 | End: 2020-03-10 | Stop reason: HOSPADM

## 2020-03-07 RX ADMIN — Medication 125 MG: at 17:14

## 2020-03-07 RX ADMIN — MONTELUKAST SODIUM 10 MG: 10 TABLET, COATED ORAL at 21:17

## 2020-03-07 RX ADMIN — GABAPENTIN 100 MG: 100 CAPSULE ORAL at 21:17

## 2020-03-07 RX ADMIN — HYDROCODONE BITARTRATE AND ACETAMINOPHEN 1 TABLET: 7.5; 325 TABLET ORAL at 21:17

## 2020-03-07 RX ADMIN — LOSARTAN POTASSIUM 50 MG: 50 TABLET, FILM COATED ORAL at 17:13

## 2020-03-07 RX ADMIN — INSULIN ASPART 10 UNITS: 100 INJECTION, SOLUTION INTRAVENOUS; SUBCUTANEOUS at 17:14

## 2020-03-07 RX ADMIN — SODIUM CHLORIDE, PRESERVATIVE FREE 10 ML: 5 INJECTION INTRAVENOUS at 21:17

## 2020-03-07 RX ADMIN — ASPIRIN 81 MG: 81 TABLET, COATED ORAL at 17:13

## 2020-03-07 RX ADMIN — INSULIN ASPART 4 UNITS: 100 INJECTION, SOLUTION INTRAVENOUS; SUBCUTANEOUS at 17:14

## 2020-03-07 RX ADMIN — ATORVASTATIN CALCIUM 10 MG: 10 TABLET, FILM COATED ORAL at 17:13

## 2020-03-07 RX ADMIN — ENOXAPARIN SODIUM 80 MG: 80 INJECTION SUBCUTANEOUS at 18:12

## 2020-03-07 RX ADMIN — Medication 2 TABLET: at 17:13

## 2020-03-07 RX ADMIN — AMLODIPINE BESYLATE 10 MG: 10 TABLET ORAL at 17:13

## 2020-03-07 NOTE — H&P
[facility] Medicine Admission      Date of Admission: 3/7/2020      Primary Care Physician: Chantel Long MD      Chief Complaint: Fatigue, malaise, dyspnea on exertion    HPI: Patient is an 84-year-old  female who presented to [facility] with worsening fatigue and dyspnea on exertion.  She states it started on Thursday.  They were worse on Friday.  She presented to [facility] emergency department and was found to have urinary tract infection and atrial fibrillation.  She is admitted to the hospital for further work-up and treatment.  On the day of admission to our facility she developed intermittent, nonsustained, ventricular tachycardia.  There is no cardiologist available at [facility] so she was transferred to our facility for further work-up.  Patient was seen by cardiology in the past and underwent Holter monitor.  She was noted to have nonsustained wide-complex tachycardia at that point.  At that point she was started on a beta-blocker and no further evaluation was done.  She is currently without symptoms.    Concurrent Medical History:  has a past medical history of Acute bronchitis, Acute sinusitis, Acute upper respiratory infection, Anxiety, Atrial fibrillation (CMS/[facility]), Cellulitis of lower leg, Chronic urinary tract infection, COPD (chronic obstructive pulmonary disease) (CMS/[facility]), Cough, Death of , Diabetic asymmetric polyneuropathy (CMS/[facility]), Diabetic peripheral neuropathy (CMS/HCC), Diabetic polyneuropathy (CMS/HCC), Disease of nail, Dorsalgia, Flank pain, Foot ulcer (CMS/HCC), Hashimoto's thyroiditis, History of echocardiogram (02/19/2013), Hypertensive disorder, Insomnia, Mixed hyperlipidemia, Non-healing surgical wound, Osteoarthritis of multiple joints, Peripheral vascular disease (CMS/HCC), Seasonal allergic rhinitis, Type II diabetes mellitus, uncontrolled (CMS/[facility]), Upper respiratory infection,  Urinary tract infectious disease, Urinary tract infectious disease, and Vitamin D deficiency.    Past Surgical History:  has a past surgical history that includes Steroid Injection (02/08/2016); Excision Mass Leg (Left, 11/05/2013); and Hysterectomy.    Family History: Coronary artery disease and hypertension    Social History:  reports that she has never smoked. She has never used smokeless tobacco. She reports that she does not drink alcohol or use drugs.    Allergies:   Allergies   Allergen Reactions   • Latex Itching   • Penicillins Swelling and Rash       Medications:   Prior to Admission medications    Medication Sig Start Date End Date Taking? Authorizing Provider   aspirin 81 MG tablet Take 81 mg by mouth daily.   Yes ProviderRadha MD   Calcium Carbonate (CALCIUM 600 PO) Take 2 tablets by mouth daily.   Yes Radha Vines MD   DULoxetine (CYMBALTA) 60 MG capsule TAKE 1 CAPSULE BY MOUTH DAILY. 1/8/20  Yes Chantel Long MD   gabapentin (NEURONTIN) 300 MG capsule TAKE ONE CAPSULE BY MOUTH THREE TIMES A DAY 12/19/19  Yes Chantel Long MD   HYDROcodone-acetaminophen (NORCO) 7.5-325 MG per tablet Take 1 tablet by mouth 2 (Two) Times a Day. 6/26/19  Yes Chantel Long MD   Insulin Glargine (BASAGLAR KWIKPEN) 100 UNIT/ML injection pen Inject 15 Units under the skin Every Night. 12/19/19  Yes Chantel Long MD   irbesartan (AVAPRO) 150 MG tablet TAKE 1 TABLET BY MOUTH EVERY NIGHT. 6/24/19  Yes Chantel Long MD   levothyroxine (SYNTHROID, LEVOTHROID) 100 MCG tablet TAKE 1 TABLET DAILY 10/28/19  Yes Chantel Long MD   LORazepam (ATIVAN) 1 MG tablet Take 1 tablet by mouth At Night As Needed for Anxiety or Sleep. 12/19/19  Yes Chantel Long MD   metFORMIN (GLUCOPHAGE) 850 MG tablet Take 1 tablet by mouth 2 (Two) Times a Day With Meals. 12/19/19  Yes Chantel Long MD   metoprolol succinate XL (TOPROL-XL) 25 MG 24 hr tablet Take 1 tablet by mouth Daily.  Patient taking  "differently: Take 50 mg by mouth Daily. 11/8/19  Yes Eagle Haywood MD   montelukast (SINGULAIR) 10 MG tablet TAKE 1 TABLET BY MOUTH EVERY NIGHT  DAYS. 9/13/19 3/11/20 Yes Chantel Long MD   simvastatin (ZOCOR) 10 MG tablet Take 20 mg by mouth Daily.   Yes Radha Vines MD   sulfamethoxazole-trimethoprim (BACTRIM DS) 800-160 MG per tablet Take 1 tablet by mouth 2 (Two) Times a Day. 3/4/20  Yes Chantel Long MD   amLODIPine (NORVASC) 10 MG tablet Take 1 tablet by mouth Daily. 9/26/19   Chantel Long MD   B-D ULTRAFINE III SHORT PEN 31G X 8 MM misc Use and discard 1 pen needle 4 times daily. 10/8/19   Eyad Queen APRN   diclofenac (VOLTAREN) 1 % gel gel Apply 4 g topically 4 (Four) Times a Day. 10/5/17   Chantel Long MD   insulin aspart (novoLOG FLEXPEN) 100 UNIT/ML solution pen-injector sc pen Take 15 units TID before each meal 2/8/19   Eyad Queen APRN   Needle, Disp, 31G X 5/16\" misc 1 each 4 (Four) Times a Day. Pen Needle; 3/7/18   Chantel Long MD   TRUE METRIX BLOOD GLUCOSE TEST test strip USE TO TEST FOUR TIMES DAILY 9/25/17   Danis Soliz MD   TRUEPLUS LANCETS 28G misc TEST FOUR TIMES A DAY 8/24/18   Eyad Queen APRN   vitamin D (ERGOCALCIFEROL) 1.25 MG (74121 UT) capsule capsule TAKE ONE CAPSULE BY MOUTH ONCE WEEKLY 11/20/19   Eyad Queen APRN   albuterol (VENTOLIN HFA) 108 (90 Base) MCG/ACT inhaler Inhale 2 puffs Every 6 (Six) Hours As Needed for Wheezing or Shortness of Air. 6/20/18 3/7/20  Chantel Long MD   azithromycin (ZITHROMAX Z-MELINA) 250 MG tablet Take 2 tablets the first day, then 1 tablet daily for 4 days. 12/31/19 3/7/20  Chantel Long MD   famciclovir (FAMVIR) 250 MG tablet Take 1 tablet by mouth 3 (Three) Times a Day. 5/14/18 3/7/20  Chantel Long MD   fluticasone (FLONASE) 50 MCG/ACT nasal spray SPRAY 1 SPRAY INTO EACH NOSTRIL BIDM 4/17/18 3/7/20  Chantel Long MD   montelust " (SINGULAIR) 10 MG tablet  10/25/18 3/7/20  Provider, MD Radha       Review of Systems:  Review of Systems   Constitutional: Positive for activity change and fatigue. Negative for appetite change, chills, fever and unexpected weight change.   HENT: Negative for congestion, facial swelling, hearing loss, nosebleeds, rhinorrhea, sneezing, trouble swallowing and voice change.    Eyes: Negative for photophobia and visual disturbance.   Respiratory: Positive for shortness of breath (primarily with exertion). Negative for apnea, cough, choking, chest tightness, wheezing and stridor.    Cardiovascular: Negative for chest pain, palpitations and leg swelling.   Gastrointestinal: Negative for abdominal pain, blood in stool, constipation, diarrhea, nausea and vomiting.   Endocrine: Negative for cold intolerance, heat intolerance, polydipsia, polyphagia and polyuria.   Genitourinary: Negative for dysuria, flank pain and hematuria.   Musculoskeletal: Negative for arthralgias, back pain, myalgias and neck pain.   Skin: Negative for rash and wound.   Allergic/Immunologic: Negative for immunocompromised state.   Neurological: Positive for weakness. Negative for dizziness, seizures, syncope, speech difficulty, light-headedness, numbness and headaches.   Hematological: Does not bruise/bleed easily.   Psychiatric/Behavioral: Negative for agitation, behavioral problems, confusion, decreased concentration, hallucinations, self-injury and suicidal ideas. The patient is not nervous/anxious.       Otherwise complete ROS is negative except as mentioned above.    Physical Exam:   Temp:  [97.3 °F (36.3 °C)-98.1 °F (36.7 °C)] 98.1 °F (36.7 °C)  Heart Rate:  [] 87  Resp:  [18] 18  BP: (135-142)/(55-74) 142/55     Physical Exam   Constitutional: She is oriented to person, place, and time. She appears well-developed and well-nourished.   HENT:   Head: Normocephalic and atraumatic.   Nose: Nose normal.   Mouth/Throat: Oropharynx is  clear and moist.   Eyes: Pupils are equal, round, and reactive to light. Conjunctivae, EOM and lids are normal. No scleral icterus.   Neck: Normal range of motion. Neck supple. No JVD present. No tracheal tenderness and no spinous process tenderness present. No neck rigidity. No tracheal deviation, no edema and normal range of motion present.   Cardiovascular: Normal rate, regular rhythm, S1 normal, S2 normal, normal heart sounds and normal pulses. Exam reveals no gallop and no friction rub.   No murmur heard.  Pulmonary/Chest: Effort normal and breath sounds normal. No accessory muscle usage. No respiratory distress. She has no decreased breath sounds. She has no wheezes. She has no rales. She exhibits no tenderness.   Abdominal: Soft. Bowel sounds are normal. She exhibits no distension and no mass. There is no tenderness. There is no rebound and no guarding.   Musculoskeletal: She exhibits no edema or tenderness.   Neurological: She is alert and oriented to person, place, and time. She has normal reflexes. She displays no atrophy and normal reflexes. No cranial nerve deficit or sensory deficit. She exhibits normal muscle tone. She displays no seizure activity. Coordination normal.   Skin: Skin is warm. No rash noted. No pallor.   Psychiatric: She has a normal mood and affect. Her behavior is normal. Judgment and thought content normal.         Results Reviewed:  I have personally reviewed current lab, radiology, and data and agree with results.  Lab Results (last 24 hours)     ** No results found for the last 24 hours. **        Imaging Results (Last 24 Hours)     ** No results found for the last 24 hours. **            Assessment:    Active Hospital Problems    Diagnosis   • Chronic obstructive pulmonary disease (CMS/HCC)   • Controlled type 2 diabetes mellitus without complication, with long-term current use of insulin (CMS/HCC)   • Diabetic peripheral neuropathy (CMS/HCC)   • Mixed hyperlipidemia   • Peripheral  vascular disease (CMS/[facility])             Plan:  1.  Nonsustained ventricular tachycardia: Patient currently is asymptomatic.  Placed on telemetry.  Patient apparently has a history of some wide-complex tachycardia seen on Holter monitor in the past.  He was placed on beta-blocker therefore we will continue with her beta-blocker.  Cardiology has been consulted.  2.  C. difficile colitis: Patient had no complaints of diarrhea to me.  C. difficile toxin was obtained at [facility] and was reported to us as positive.  Will treat with oral vancomycin.  3.  Diabetes mellitus: We will place on home insulin at lower dose and sliding scale.  4.  COPD: No exacerbation.  Continue home medications.  5.  DVT prophylaxis: Lovenox.    I discussed the patient's findings and my recommendations with: Patient        This document has been electronically signed by Vinayak Posadas MD on March 7, 2020 4:15 PM

## 2020-03-08 LAB
ANION GAP SERPL CALCULATED.3IONS-SCNC: 14 MMOL/L (ref 5–15)
BASOPHILS # BLD AUTO: 0.05 10*3/MM3 (ref 0–0.2)
BASOPHILS NFR BLD AUTO: 0.7 % (ref 0–1.5)
BUN BLD-MCNC: 15 MG/DL (ref 8–23)
BUN/CREAT SERPL: 18.3 (ref 7–25)
CALCIUM SPEC-SCNC: 8.7 MG/DL (ref 8.6–10.5)
CHLORIDE SERPL-SCNC: 98 MMOL/L (ref 98–107)
CO2 SERPL-SCNC: 22 MMOL/L (ref 22–29)
CREAT BLD-MCNC: 0.82 MG/DL (ref 0.57–1)
DEPRECATED RDW RBC AUTO: 43.3 FL (ref 37–54)
EOSINOPHIL # BLD AUTO: 0.25 10*3/MM3 (ref 0–0.4)
EOSINOPHIL NFR BLD AUTO: 3.3 % (ref 0.3–6.2)
ERYTHROCYTE [DISTWIDTH] IN BLOOD BY AUTOMATED COUNT: 13.3 % (ref 12.3–15.4)
GFR SERPL CREATININE-BSD FRML MDRD: 67 ML/MIN/1.73
GLUCOSE BLD-MCNC: 225 MG/DL (ref 65–99)
GLUCOSE BLDC GLUCOMTR-MCNC: 191 MG/DL (ref 70–130)
GLUCOSE BLDC GLUCOMTR-MCNC: 245 MG/DL (ref 70–130)
GLUCOSE BLDC GLUCOMTR-MCNC: 267 MG/DL (ref 70–130)
GLUCOSE BLDC GLUCOMTR-MCNC: 90 MG/DL (ref 70–130)
HCT VFR BLD AUTO: 32.8 % (ref 34–46.6)
HGB BLD-MCNC: 10.7 G/DL (ref 12–15.9)
IMM GRANULOCYTES # BLD AUTO: 0.04 10*3/MM3 (ref 0–0.05)
IMM GRANULOCYTES NFR BLD AUTO: 0.5 % (ref 0–0.5)
LYMPHOCYTES # BLD AUTO: 2.19 10*3/MM3 (ref 0.7–3.1)
LYMPHOCYTES NFR BLD AUTO: 29 % (ref 19.6–45.3)
MCH RBC QN AUTO: 29.1 PG (ref 26.6–33)
MCHC RBC AUTO-ENTMCNC: 32.6 G/DL (ref 31.5–35.7)
MCV RBC AUTO: 89.1 FL (ref 79–97)
MONOCYTES # BLD AUTO: 1.04 10*3/MM3 (ref 0.1–0.9)
MONOCYTES NFR BLD AUTO: 13.8 % (ref 5–12)
NEUTROPHILS # BLD AUTO: 3.98 10*3/MM3 (ref 1.7–7)
NEUTROPHILS NFR BLD AUTO: 52.7 % (ref 42.7–76)
NRBC BLD AUTO-RTO: 0 /100 WBC (ref 0–0.2)
PLATELET # BLD AUTO: 291 10*3/MM3 (ref 140–450)
PMV BLD AUTO: 9.2 FL (ref 6–12)
POTASSIUM BLD-SCNC: 4.1 MMOL/L (ref 3.5–5.2)
RBC # BLD AUTO: 3.68 10*6/MM3 (ref 3.77–5.28)
SODIUM BLD-SCNC: 134 MMOL/L (ref 136–145)
WBC NRBC COR # BLD: 7.55 10*3/MM3 (ref 3.4–10.8)

## 2020-03-08 PROCEDURE — 85025 COMPLETE CBC W/AUTO DIFF WBC: CPT | Performed by: HOSPITALIST

## 2020-03-08 PROCEDURE — 25010000002 ENOXAPARIN PER 10 MG: Performed by: HOSPITALIST

## 2020-03-08 PROCEDURE — 63710000001 INSULIN DETEMIR PER 5 UNITS: Performed by: HOSPITALIST

## 2020-03-08 PROCEDURE — 93005 ELECTROCARDIOGRAM TRACING: CPT | Performed by: INTERNAL MEDICINE

## 2020-03-08 PROCEDURE — 80048 BASIC METABOLIC PNL TOTAL CA: CPT | Performed by: HOSPITALIST

## 2020-03-08 PROCEDURE — 82962 GLUCOSE BLOOD TEST: CPT

## 2020-03-08 PROCEDURE — 63710000001 INSULIN ASPART PER 5 UNITS: Performed by: HOSPITALIST

## 2020-03-08 PROCEDURE — 93010 ELECTROCARDIOGRAM REPORT: CPT | Performed by: INTERNAL MEDICINE

## 2020-03-08 PROCEDURE — 99223 1ST HOSP IP/OBS HIGH 75: CPT | Performed by: INTERNAL MEDICINE

## 2020-03-08 RX ADMIN — GABAPENTIN 100 MG: 100 CAPSULE ORAL at 15:40

## 2020-03-08 RX ADMIN — MONTELUKAST SODIUM 10 MG: 10 TABLET, COATED ORAL at 20:41

## 2020-03-08 RX ADMIN — Medication 2 TABLET: at 08:57

## 2020-03-08 RX ADMIN — ASPIRIN 81 MG: 81 TABLET, COATED ORAL at 08:57

## 2020-03-08 RX ADMIN — GABAPENTIN 100 MG: 100 CAPSULE ORAL at 22:00

## 2020-03-08 RX ADMIN — SODIUM CHLORIDE, PRESERVATIVE FREE 10 ML: 5 INJECTION INTRAVENOUS at 08:58

## 2020-03-08 RX ADMIN — METOPROLOL SUCCINATE 25 MG: 25 TABLET, FILM COATED, EXTENDED RELEASE ORAL at 08:58

## 2020-03-08 RX ADMIN — INSULIN ASPART 10 UNITS: 100 INJECTION, SOLUTION INTRAVENOUS; SUBCUTANEOUS at 12:16

## 2020-03-08 RX ADMIN — INSULIN ASPART 2 UNITS: 100 INJECTION, SOLUTION INTRAVENOUS; SUBCUTANEOUS at 08:57

## 2020-03-08 RX ADMIN — INSULIN ASPART 5 UNITS: 100 INJECTION, SOLUTION INTRAVENOUS; SUBCUTANEOUS at 18:31

## 2020-03-08 RX ADMIN — INSULIN ASPART 4 UNITS: 100 INJECTION, SOLUTION INTRAVENOUS; SUBCUTANEOUS at 21:33

## 2020-03-08 RX ADMIN — INSULIN ASPART 6 UNITS: 100 INJECTION, SOLUTION INTRAVENOUS; SUBCUTANEOUS at 12:15

## 2020-03-08 RX ADMIN — Medication 125 MG: at 12:15

## 2020-03-08 RX ADMIN — APIXABAN 5 MG: 5 TABLET, FILM COATED ORAL at 17:19

## 2020-03-08 RX ADMIN — Medication 125 MG: at 00:54

## 2020-03-08 RX ADMIN — ATORVASTATIN CALCIUM 10 MG: 10 TABLET, FILM COATED ORAL at 08:58

## 2020-03-08 RX ADMIN — GABAPENTIN 100 MG: 100 CAPSULE ORAL at 05:21

## 2020-03-08 RX ADMIN — Medication 125 MG: at 05:23

## 2020-03-08 RX ADMIN — LOSARTAN POTASSIUM 50 MG: 50 TABLET, FILM COATED ORAL at 08:58

## 2020-03-08 RX ADMIN — SODIUM CHLORIDE, PRESERVATIVE FREE 10 ML: 5 INJECTION INTRAVENOUS at 20:49

## 2020-03-08 RX ADMIN — INSULIN ASPART 10 UNITS: 100 INJECTION, SOLUTION INTRAVENOUS; SUBCUTANEOUS at 08:58

## 2020-03-08 RX ADMIN — HYDROCODONE BITARTRATE AND ACETAMINOPHEN 1 TABLET: 7.5; 325 TABLET ORAL at 08:58

## 2020-03-08 RX ADMIN — ENOXAPARIN SODIUM 80 MG: 80 INJECTION SUBCUTANEOUS at 05:21

## 2020-03-08 RX ADMIN — DULOXETINE HYDROCHLORIDE 60 MG: 60 CAPSULE, DELAYED RELEASE ORAL at 08:58

## 2020-03-08 RX ADMIN — AMLODIPINE BESYLATE 10 MG: 10 TABLET ORAL at 08:58

## 2020-03-08 RX ADMIN — LEVOTHYROXINE SODIUM 100 MCG: 100 TABLET ORAL at 08:58

## 2020-03-08 RX ADMIN — INSULIN DETEMIR 10 UNITS: 100 INJECTION, SOLUTION SUBCUTANEOUS at 20:42

## 2020-03-08 RX ADMIN — HYDROCODONE BITARTRATE AND ACETAMINOPHEN 1 TABLET: 7.5; 325 TABLET ORAL at 20:41

## 2020-03-08 NOTE — PROGRESS NOTES
[facility] Medicine Services  INPATIENT PROGRESS NOTE    Length of Stay: 1  Date of Admission: 3/7/2020  Primary Care Physician: Chantel Long MD    Subjective   Chief Complaint: Generalized weakness  HPI:   84-year-old  female with past medical history significant for coronary disease, history of nonsustained supraventricular tachycardia, anxiety, diabetes with diabetic neuropathy,Hyperlipidemia, Was sent from an outside hospital for complaints of generalized weakness and tiredness.  Patient was found to have A. fib with RVR and also episoded of nonsustained ventricular tachycardia.  Patient was stared on Toprol-xl and eliquis.     Also it was noted that the patient was positive for C. difficile toxin.  On discussing with the patient she clearly denies any abdominal pain or diarrhea.  Patient was started on p.o. vancomycin.  I think it is reasonable at this point to discontinue the patient vancomycin as she does not have any diarrhea.    At the time of examination patient claims to be doing fairly well.  She denies any shortness of breath or chest pain.  She does report of generalized weakness.  She denies any diarrhea she denies any fever or chills.    Review of Systems     All pertinent negatives and positives are as above. All other systems have been reviewed and are negative unless otherwise stated.     Objective    Temp:  [97.3 °F (36.3 °C)-98.1 °F (36.7 °C)] 98.1 °F (36.7 °C)  Heart Rate:  [] 85  Resp:  [18] 18  BP: (106-148)/(54-80) 148/55    Physical Exam    Constitutional: Positive for activity change and fatigue. Negative for appetite change, chills, fever and unexpected weight change.   HENT: Negative for congestion, facial swelling, hearing loss, nosebleeds, rhinorrhea, sneezing, trouble swallowing and voice change.    Eyes: Negative for photophobia and visual disturbance.   Respiratory: Positive for shortness of breath (primarily with  exertion). Negative for apnea, cough, choking, chest tightness, wheezing and stridor.    Cardiovascular: Negative for chest pain, palpitations and leg swelling.   Gastrointestinal: Negative for abdominal pain, blood in stool, constipation, diarrhea, nausea and vomiting.   Endocrine: Negative for cold intolerance, heat intolerance, polydipsia, polyphagia and polyuria.   Genitourinary: Negative for dysuria, flank pain and hematuria.   Musculoskeletal: Negative for arthralgias, back pain, myalgias and neck pain.   Skin: Negative for rash and wound.   Allergic/Immunologic: Negative for immunocompromised state.   Neurological: Positive for weakness. Negative for dizziness, seizures, syncope, speech difficulty, light-headedness, numbness and headaches.   Hematological: Does not bruise/bleed easily.   Psychiatric/Behavioral: Negative for agitation, behavioral problems, confusion, decreased concentration, hallucinations, self-injury and suicidal ideas. The patient is not nervous/anxious.           Results Review:  I have reviewed the labs, radiology results, and diagnostic studies.    Laboratory Data:   Results from last 7 days   Lab Units 03/08/20  0547 03/07/20  1655   SODIUM mmol/L 134* 135*   POTASSIUM mmol/L 4.1 5.1   CHLORIDE mmol/L 98 100   CO2 mmol/L 22.0 21.0*   BUN mg/dL 15 13   CREATININE mg/dL 0.82 0.91   GLUCOSE mg/dL 225* 212*   CALCIUM mg/dL 8.7 8.8   ANION GAP mmol/L 14.0 14.0     Estimated Creatinine Clearance: 56.9 mL/min (by C-G formula based on SCr of 0.82 mg/dL).  Results from last 7 days   Lab Units 03/07/20  1655 03/07/20  0625 03/06/20  2135   MAGNESIUM mg/dL 1.9 1.8 1.4*   PHOSPHORUS mg/dL 2.5  --   --          Results from last 7 days   Lab Units 03/08/20  0547 03/07/20  1655   WBC 10*3/mm3 7.55 9.89   HEMOGLOBIN g/dL 10.7* 10.6*   HEMATOCRIT % 32.8* 32.3*   PLATELETS 10*3/mm3 291 271           Culture Data:   No results found for: BLOODCX  No results found for: URINECX  No results found for:  RESPCX  No results found for: WOUNDCX  No results found for: STOOLCX  No components found for: BODYFLD    Radiology Data:   Imaging Results (Last 24 Hours)     ** No results found for the last 24 hours. **          I have reviewed the patient's current medications.     Assessment/Plan     Active Hospital Problems    Diagnosis   • Atrial fibrillation with RVR (CMS/[facility])   • Chronic obstructive pulmonary disease (CMS/[facility])   • Controlled type 2 diabetes mellitus without complication, with long-term current use of insulin (CMS/[facility])   • Diabetic peripheral neuropathy (CMS/[facility])   • Mixed hyperlipidemia   • Peripheral vascular disease (CMS/[facility])     1.  New onset A. Fib/episodes of nonsustained V. tach-  Patient was started on Toprol-XL.  Patient is on Eliquis for stroke prophylaxis.  Patient's REESE VASc score is 6  Cardiology has been consulted.  Possible ischemic workup by Dr. Haywood ([facility] cardiologist) tomorrow.    2. C. difficile colitis-    On discussing with the patient she clearly denies any abdominal pain or diarrhea in the last 24 hrs.  Patient was started on p.o. vancomycin.  I think it is reasonable at this point to discontinue the vancomycin as she does not have any diarrhea.    3.  Diabetes mellitus-   We will place on home insulin at lower dose and sliding scale.    4.  COPD-   No exacerbation.  Continue home medications.    5.  DVT prophylaxis-   eliquis    Discharge Planning: I expect patient to be discharged to home in 1 days.        This document has been electronically signed by Amanda De Souza MD on March 8, 2020 1:00 PM

## 2020-03-08 NOTE — CONSULTS
"CARDIOVASCULAR CONSULTATION   Robert Cosme M.D., Ph.D., [facility]                Referring Provider: Dr. Posadas  Consult type: Routine  Reason for Consultation: New onset AF with concerns for WCT/NSVT  Chief complaint \"I was worn out.\"    Subjective .     History of present illness:  Nyla Piña is a 84 y.o. female patient normally of Dr. Haywood   Followed for numerous risk factors for coronary artery disease, and a history of NSVT and nonsustained atrial tachycardia with known aortic stenosis (mild in 2018) who was recently admitted to an OSH for generalized weakness and fatigue.  She reportedly had an abnormal C. difficile laboratory study.  However, she denies recent diarrhea.  Her main complaint is generalized weakness and fatigue.  She does give a history of SVT in the past.  She does note that she was placed on Lopressor.  She has been well from that standpoint for the past several years until the past few days.  Strips were consistent with atrial fibrillation.  She then had some nonsustained WCT primarily consistent with ventricular tachycardia, though some more likely SVT with aberrancy.  She was asymptomatic during those episodes.  She has been hemodynamically stable since being admitted to our facility.  She is had no resting, exertional or nocturnal angina.  No dizziness, lightheadedness or syncope.  No exertional dyspnea, orthopnea, PND or lower extremity edema.  No abdominal ascites or early abdominal satiety.  She rested well overnight.  Laboratory evaluation shows stable anemia.  She denies being told in the past that she has anemia.  No recent iron studies in our system.  She denies bleeding episodes or prior GI hemorrhages.    ROS notable only for fatigue    History  Past Medical History:   Diagnosis Date   • Acute bronchitis    • Acute sinusitis    • Acute upper respiratory infection    • Anxiety    • Atrial fibrillation (CMS/HCC)    • Cellulitis of lower leg    • Chronic urinary tract " infection    • COPD (chronic obstructive pulmonary disease) (CMS/[facility])    • Cough    • Death of     • Diabetic asymmetric polyneuropathy (CMS/[facility])    • Diabetic peripheral neuropathy (CMS/[facility])    • Diabetic polyneuropathy (CMS/[facility])    • Disease of nail    • Dorsalgia    • Flank pain    • Foot ulcer (CMS/[facility])    • Hashimoto's thyroiditis    • History of echocardiogram 02/19/2013   • Hypertensive disorder    • Insomnia    • Mixed hyperlipidemia    • Non-healing surgical wound    • Osteoarthritis of multiple joints    • Peripheral vascular disease (CMS/[facility])    • Seasonal allergic rhinitis    • Type II diabetes mellitus, uncontrolled (CMS/[facility])    • Upper respiratory infection    • Urinary tract infectious disease    • Urinary tract infectious disease    • Vitamin D deficiency    ,   Past Surgical History:   Procedure Laterality Date   • EXCISION MASS LEG Left 11/05/2013    Excision of soft tissue mass of left leg   • HYSTERECTOMY      Age 27   • STEROID INJECTION  02/08/2016    Depo Medrol (Methylprednisone) 80mg (Acute upper respiratory infection, unspecified)    , History reviewed. No pertinent family history.,   Social History     Tobacco Use   • Smoking status: Never Smoker   • Smokeless tobacco: Never Used   Substance Use Topics   • Alcohol use: No   • Drug use: No   ,   Facility-Administered Medications Prior to Admission   Medication Dose Route Frequency Provider Last Rate Last Dose   • cyanocobalamin injection 1,000 mcg  1,000 mcg Intramuscular Q28 Days Chantel Long MD   1,000 mcg at 03/07/19 1145     Medications Prior to Admission   Medication Sig Dispense Refill Last Dose   • aspirin 81 MG tablet Take 81 mg by mouth daily.   3/6/2020 at Unknown time   • Calcium Carbonate (CALCIUM 600 PO) Take 2 tablets by mouth daily.   3/6/2020 at Unknown time   • DULoxetine (CYMBALTA) 60 MG capsule TAKE 1 CAPSULE BY MOUTH DAILY. 90 capsule 1 3/7/2020 at Unknown time   • gabapentin (NEURONTIN) 300 MG capsule TAKE  "ONE CAPSULE BY MOUTH THREE TIMES A DAY 90 capsule 2 3/7/2020 at Unknown time   • HYDROcodone-acetaminophen (NORCO) 7.5-325 MG per tablet Take 1 tablet by mouth 2 (Two) Times a Day. 60 tablet 0 3/7/2020 at Unknown time   • Insulin Glargine (BASAGLAR KWIKPEN) 100 UNIT/ML injection pen Inject 15 Units under the skin Every Night. 5 pen 5 3/6/2020 at Unknown time   • irbesartan (AVAPRO) 150 MG tablet TAKE 1 TABLET BY MOUTH EVERY NIGHT. 30 tablet 5 3/6/2020 at Unknown time   • levothyroxine (SYNTHROID, LEVOTHROID) 100 MCG tablet TAKE 1 TABLET DAILY 90 tablet 1 3/7/2020 at Unknown time   • LORazepam (ATIVAN) 1 MG tablet Take 1 tablet by mouth At Night As Needed for Anxiety or Sleep. 30 tablet 2 3/6/2020 at Unknown time   • metFORMIN (GLUCOPHAGE) 850 MG tablet Take 1 tablet by mouth 2 (Two) Times a Day With Meals. 180 tablet 1 3/6/2020 at Unknown time   • metoprolol succinate XL (TOPROL-XL) 25 MG 24 hr tablet Take 1 tablet by mouth Daily. (Patient taking differently: Take 50 mg by mouth Daily.) 90 tablet 3 3/7/2020 at Unknown time   • montelukast (SINGULAIR) 10 MG tablet TAKE 1 TABLET BY MOUTH EVERY NIGHT  DAYS. 30 tablet 5 3/6/2020 at Unknown time   • simvastatin (ZOCOR) 10 MG tablet Take 20 mg by mouth Daily.      • sulfamethoxazole-trimethoprim (BACTRIM DS) 800-160 MG per tablet Take 1 tablet by mouth 2 (Two) Times a Day. 20 tablet 0 3/7/2020 at Unknown time   • amLODIPine (NORVASC) 10 MG tablet Take 1 tablet by mouth Daily. 30 tablet 4 Unknown at Unknown time   • B-D ULTRAFINE III SHORT PEN 31G X 8 MM misc Use and discard 1 pen needle 4 times daily. 200 each 5 Taking   • diclofenac (VOLTAREN) 1 % gel gel Apply 4 g topically 4 (Four) Times a Day. 100 g 11 Unknown at Unknown time   • insulin aspart (novoLOG FLEXPEN) 100 UNIT/ML solution pen-injector sc pen Take 15 units TID before each meal 15 pen 3 Unknown at Unknown time   • Needle, Disp, 31G X 5/16\" misc 1 each 4 (Four) Times a Day. Pen Needle; 400 each 3 Taking " "  • TRUE METRIX BLOOD GLUCOSE TEST test strip USE TO TEST FOUR TIMES DAILY 150 each 11 Taking   • TRUEPLUS LANCETS 28G misc TEST FOUR TIMES A  each 11 Taking   • vitamin D (ERGOCALCIFEROL) 1.25 MG (40693 UT) capsule capsule TAKE ONE CAPSULE BY MOUTH ONCE WEEKLY 12 capsule 11 2/29/2020    and Allergies:  Latex and Penicillins    Objective   PHYSICAL EXAM:         Anticoagulants (From admission, onward)    Start     Dose/Rate Route Frequency Ordered Stop    03/07/20 1815  enoxaparin (LOVENOX) syringe 80 mg      1 mg/kg × 78.7 kg Subcutaneous Every 12 Hours 03/07/20 1727      03/07/20 1622  Pharmacy to Dose enoxaparin (LOVENOX)       Does not apply Continuous PRN 03/07/20 1622            Vital Sign Min/Max for last 24 hours  Temp  Min: 97.3 °F (36.3 °C)  Max: 98.1 °F (36.7 °C)   BP  Min: 106/54  Max: 142/55   Pulse  Min: 63  Max: 110   Resp  Min: 18  Max: 18   SpO2  Min: 93 %  Max: 98 %   No data recorded   Weight  Min: 78.1 kg (172 lb 3.2 oz)  Max: 86.2 kg (190 lb)       Vitals:    03/08/20 0029 03/08/20 0400 03/08/20 0444 03/08/20 0729   BP: 112/69  114/68 133/80   BP Location: Right arm  Right arm Left arm   Patient Position: Lying  Sitting Lying   Pulse: 63  79 87   Resp: 18  18 18   Temp: 98.1 °F (36.7 °C)  97.7 °F (36.5 °C) 98.1 °F (36.7 °C)   TempSrc: Temporal  Temporal Temporal   SpO2: 96%  96% 93%   Weight:  78.1 kg (172 lb 3.2 oz)     Height:         [unfilled]  SpO2 Percentage    03/08/20 0029 03/08/20 0444 03/08/20 0729   SpO2: 96% 96% 93%     Body mass index is 26.97 kg/m².   Current Pain Level: none  Pulse Pressure: normal  Pulse Ox: Normal  on room air  General: alert, appears stated age and cooperative     Body Habitus: obese    HEENT: Head: Normocephalic, no lesions, without obvious abnormality. No arcus senilis, xanthelasma or xanthomas.    Neuro: alert, oriented x3  speech normal in context and clarity  memory intact grossly  Pulses: 2+ and symmetric  JVP: Volume/Pulsation: large \"v\" wave.  " Normal waveforms.   Appropriate inspiratory decrease.  No Kussmaul's. No Yumiko's.   Carotid Exam: no bruit normal pulsation bilaterally   Carotid Volume: normal.     Subclavian Bruit: absent  Vertebral Bruit: absent  Respirations: no increased work of breathing   Chest:  Normal    Pulmonary:Normal     Precordium: Normal impulses. P2 is not palpable.  RV Heave: absent  LV Heave: absent  Elizabeth:  normal size and placement  Palpable S4: absent.  Heart rate: normal    Heart Rhythm: regular     Heart Sounds: S1: normal  S2: increased intensity, increased P2  S3: absent   S4: absent  Opening Snap: absent    A2-OS:  no  Pericardial Rub:  Absent   Ejection click: None      Murmurs:  present    Murmur 1 Type: systolic  Grade: 2/6    Timing: crescendo-decrescendo  Location:   base   Description:ejection  Radiation:  nonradiating  Dynamic Auscultation:  was unchanged with position or respiration   Extremity: moves all extremities equally.           DATA REVIEWED:     EKG. I personally reviewed and interpreted the EKG.          Strips were reviewed from OSH showing atrial fibrillation.  There are 2 strips showing a WCT with a CL ~435ms.  This was fairly consistent and then 1 strip shows variable CLs from ~400-430 ms.    ---------------------------------------------------  TTE/KANA:  Results for orders placed during the hospital encounter of 03/07/20   Adult Transthoracic Echo Complete W/ Cont if Necessary Per Protocol    Narrative · Left ventricular systolic function is normal. Estimated EF appears to be   in the range of 56 - 60%. Moderate hypertrophy with restrictive diastolic   funtion.  · Normal right ventricular cavity size, wall thickness, systolic function   and septal motion noted.  · Calcified and thickened aortic valve with moderate aortic stenosis.  · Mitral valve thickening with mild to moderate mitral regurgitation.  · Mild tricuspid valve regurgitation is present. Mild pulmonary   hypertension is present.                Lab Results   Component Value Date    GLUCOSE 225 (H) 03/08/2020    BUN 15 03/08/2020    CREATININE 0.82 03/08/2020    EGFRIFNONA 67 03/08/2020    BCR 18.3 03/08/2020    K 4.1 03/08/2020    CO2 22.0 03/08/2020    CALCIUM 8.7 03/08/2020    ALBUMIN 4.40 02/06/2020    AST 22 02/06/2020    ALT 17 02/06/2020       Lab Results   Component Value Date    GLUCOSE 225 (H) 03/08/2020    CALCIUM 8.7 03/08/2020     (L) 03/08/2020    K 4.1 03/08/2020    CO2 22.0 03/08/2020    CL 98 03/08/2020    BUN 15 03/08/2020    CREATININE 0.82 03/08/2020    EGFRIFNONA 67 03/08/2020    BCR 18.3 03/08/2020    ANIONGAP 14.0 03/08/2020       Lab Results   Component Value Date    WBC 7.55 03/08/2020    HGB 10.7 (L) 03/08/2020    HCT 32.8 (L) 03/08/2020    MCV 89.1 03/08/2020     03/08/2020       Lab Results   Component Value Date    CHOL 232 (H) 08/06/2019    CHLPL 176 07/20/2016    CHLPL 164 07/20/2016    TRIG 158 (H) 08/06/2019    HDL 57 08/06/2019     (H) 08/06/2019       Lab Results   Component Value Date    TSH 0.976 02/06/2020       Lab Results   Component Value Date    TROPONINI <0.02 03/06/2020       Lab Results   Component Value Date    HGBA1C 7.80 (H) 02/06/2020     No results found for: DDIMER  Lab Results   Component Value Date    ALT 17 02/06/2020     Lab Results   Component Value Date    HGBA1C 7.80 (H) 02/06/2020    HGBA1C 6.90 (H) 08/06/2019    HGBA1C 7.2 (H) 02/05/2019     Lab Results   Component Value Date    MICROALBUR 4.6 02/06/2020    CREATININE 0.82 03/08/2020     No results found for: IRON, TIBC, FERRITIN  No results found for: INR, PROTIME            Assessment/Plan        1. New-onset AF.  She is currently in atrial fibrillation with a controlled ventricular response.  Some of the episodes of her WCT, while nonsustained, do appear to be VT. there are a few of these that are more consistent with SVT with aberrancy with variable CLs from 400 to 445 ms.     CHADS-VASc Risk Assessment            6        Total Score        1 Hypertension    2 Age >/= 75    1 DM    1 Vascular Disease    1 Sex: Female        Criteria that do not apply:    CHF    PRIOR STROKE/TIA/THROMBO    Age 65-74        -2    The risks and benefits of anticoagulation were discussed with the patient.  She certainly merits criteria for consideration of anticoagulation.  She does have a stable anemia.  I would like to send an iron profile tomorrow and have this further investigated.  For now, will start Eliquis 5 mg 1 tablet p.o. twice daily.  I will continue her on her current dose of Toprol-XL.  Agree with ongoing monitoring with telemetry.  I would like her NPO after midnight in case Dr. Haywood would like to reassess the potential of an ischemia evaluation.      2.  Normocytic/normochromic anemia.  Will send an iron profile with labs on March 9, 2020.  Additional work-up, per primary team.      3. PAH and moderate AS. Her PA pressure is-proportion to her valvular disease.  She will continue follow-up with her primary cardiologist.    Thank you so much for allowing me to participate and consult on Nyla Piña.      Disposition: Dr. Haywood to assume care in AM          This document has been electronically signed by Robert Cosme MD PhD on March 8, 2020 9:09 AM

## 2020-03-08 NOTE — PLAN OF CARE
Problem: Patient Care Overview  Goal: Plan of Care Review  Outcome: Ongoing (interventions implemented as appropriate)  Flowsheets  Taken 3/8/2020 0745  Progress: no change  Taken 3/7/2020 1938  Plan of Care Reviewed With: patient  Goal: Individualization and Mutuality  Outcome: Ongoing (interventions implemented as appropriate)  Goal: Discharge Needs Assessment  Outcome: Ongoing (interventions implemented as appropriate)  Goal: Interprofessional Rounds/Family Conf  Outcome: Ongoing (interventions implemented as appropriate)     Problem: Urinary Tract Infection (Adult)  Goal: Signs and Symptoms of Listed Potential Problems Will be Absent, Minimized or Managed (Urinary Tract Infection)  Outcome: Ongoing (interventions implemented as appropriate)     Problem: Arrhythmia/Dysrhythmia (Symptomatic) (Adult)  Goal: Signs and Symptoms of Listed Potential Problems Will be Absent, Minimized or Managed (Arrhythmia/Dysrhythmia)  Outcome: Ongoing (interventions implemented as appropriate)     Problem: Anxiety (Adult)  Goal: Identify Related Risk Factors and Signs and Symptoms  Outcome: Ongoing (interventions implemented as appropriate)  Goal: Reduction/Resolution  Outcome: Ongoing (interventions implemented as appropriate)     Problem: Fall Risk (Adult)  Goal: Identify Related Risk Factors and Signs and Symptoms  Outcome: Ongoing (interventions implemented as appropriate)  Goal: Absence of Fall  Outcome: Ongoing (interventions implemented as appropriate)     Problem: Pain, Chronic (Adult)  Goal: Identify Related Risk Factors and Signs and Symptoms  Outcome: Ongoing (interventions implemented as appropriate)  Goal: Acceptable Pain/Comfort Level and Functional Ability  Outcome: Ongoing (interventions implemented as appropriate)     Problem: Chronic Obstructive Pulmonary Disease (Adult)  Goal: Signs and Symptoms of Listed Potential Problems Will be Absent, Minimized or Managed (Chronic Obstructive Pulmonary Disease)  Outcome:  Ongoing (interventions implemented as appropriate)     Problem: Diabetes, Type 2 (Adult)  Goal: Signs and Symptoms of Listed Potential Problems Will be Absent, Minimized or Managed (Diabetes, Type 2)  Outcome: Ongoing (interventions implemented as appropriate)

## 2020-03-08 NOTE — PLAN OF CARE
Problem: Patient Care Overview  Goal: Plan of Care Review  Outcome: Ongoing (interventions implemented as appropriate)  Flowsheets (Taken 3/7/2020 1906)  Progress: no change  Plan of Care Reviewed With: patient  Outcome Summary: VSS; pt dx with C. Diff and started on isolation; continue to monitor patient

## 2020-03-08 NOTE — PLAN OF CARE
No diarrhea again today; MD states stopping Vanc. HR: Afib; rate controlled.  Problem: Patient Care Overview  Goal: Plan of Care Review  Outcome: Ongoing (interventions implemented as appropriate)  Goal: Individualization and Mutuality  Outcome: Ongoing (interventions implemented as appropriate)  Goal: Discharge Needs Assessment  Outcome: Ongoing (interventions implemented as appropriate)  Goal: Interprofessional Rounds/Family Conf  Outcome: Ongoing (interventions implemented as appropriate)     Problem: Urinary Tract Infection (Adult)  Goal: Signs and Symptoms of Listed Potential Problems Will be Absent, Minimized or Managed (Urinary Tract Infection)  Outcome: Ongoing (interventions implemented as appropriate)     Problem: Arrhythmia/Dysrhythmia (Symptomatic) (Adult)  Goal: Signs and Symptoms of Listed Potential Problems Will be Absent, Minimized or Managed (Arrhythmia/Dysrhythmia)  Outcome: Ongoing (interventions implemented as appropriate)     Problem: Anxiety (Adult)  Goal: Identify Related Risk Factors and Signs and Symptoms  Outcome: Ongoing (interventions implemented as appropriate)  Goal: Reduction/Resolution  Outcome: Ongoing (interventions implemented as appropriate)     Problem: Fall Risk (Adult)  Goal: Identify Related Risk Factors and Signs and Symptoms  Outcome: Ongoing (interventions implemented as appropriate)  Goal: Absence of Fall  Outcome: Ongoing (interventions implemented as appropriate)     Problem: Pain, Chronic (Adult)  Goal: Identify Related Risk Factors and Signs and Symptoms  Outcome: Ongoing (interventions implemented as appropriate)  Goal: Acceptable Pain/Comfort Level and Functional Ability  Outcome: Ongoing (interventions implemented as appropriate)     Problem: Chronic Obstructive Pulmonary Disease (Adult)  Goal: Signs and Symptoms of Listed Potential Problems Will be Absent, Minimized or Managed (Chronic Obstructive Pulmonary Disease)  Outcome: Ongoing (interventions implemented as  appropriate)     Problem: Diabetes, Type 2 (Adult)  Goal: Signs and Symptoms of Listed Potential Problems Will be Absent, Minimized or Managed (Diabetes, Type 2)  Outcome: Ongoing (interventions implemented as appropriate)

## 2020-03-09 ENCOUNTER — TELEPHONE (OUTPATIENT)
Dept: PODIATRY | Facility: CLINIC | Age: 85
End: 2020-03-09

## 2020-03-09 PROBLEM — B96.1 KLEBSIELLA CYSTITIS: Status: ACTIVE | Noted: 2020-03-09

## 2020-03-09 PROBLEM — E11.9 DIABETES MELLITUS (HCC): Chronic | Status: ACTIVE | Noted: 2020-03-09

## 2020-03-09 PROBLEM — N30.90 KLEBSIELLA CYSTITIS: Status: ACTIVE | Noted: 2020-03-09

## 2020-03-09 PROBLEM — J44.9 CHRONIC OBSTRUCTIVE PULMONARY DISEASE (HCC): Chronic | Status: ACTIVE | Noted: 2019-12-19

## 2020-03-09 LAB
ANION GAP SERPL CALCULATED.3IONS-SCNC: 14 MMOL/L (ref 5–15)
BASOPHILS # BLD AUTO: 0.05 10*3/MM3 (ref 0–0.2)
BASOPHILS NFR BLD AUTO: 0.6 % (ref 0–1.5)
BUN BLD-MCNC: 14 MG/DL (ref 8–23)
BUN/CREAT SERPL: 19.2 (ref 7–25)
CALCIUM SPEC-SCNC: 9.4 MG/DL (ref 8.6–10.5)
CHLORIDE SERPL-SCNC: 99 MMOL/L (ref 98–107)
CO2 SERPL-SCNC: 23 MMOL/L (ref 22–29)
CREAT BLD-MCNC: 0.73 MG/DL (ref 0.57–1)
DEPRECATED RDW RBC AUTO: 43.5 FL (ref 37–54)
EOSINOPHIL # BLD AUTO: 0.36 10*3/MM3 (ref 0–0.4)
EOSINOPHIL NFR BLD AUTO: 4 % (ref 0.3–6.2)
ERYTHROCYTE [DISTWIDTH] IN BLOOD BY AUTOMATED COUNT: 13.2 % (ref 12.3–15.4)
GFR SERPL CREATININE-BSD FRML MDRD: 76 ML/MIN/1.73
GLUCOSE BLD-MCNC: 170 MG/DL (ref 65–99)
GLUCOSE BLDC GLUCOMTR-MCNC: 152 MG/DL (ref 70–130)
GLUCOSE BLDC GLUCOMTR-MCNC: 161 MG/DL (ref 70–130)
GLUCOSE BLDC GLUCOMTR-MCNC: 165 MG/DL (ref 70–130)
GLUCOSE BLDC GLUCOMTR-MCNC: 193 MG/DL (ref 70–130)
HCT VFR BLD AUTO: 37.3 % (ref 34–46.6)
HGB BLD-MCNC: 12.1 G/DL (ref 12–15.9)
IMM GRANULOCYTES # BLD AUTO: 0.08 10*3/MM3 (ref 0–0.05)
IMM GRANULOCYTES NFR BLD AUTO: 0.9 % (ref 0–0.5)
IRON 24H UR-MRATE: 42 MCG/DL (ref 37–145)
IRON SATN MFR SERPL: 12 % (ref 20–50)
LYMPHOCYTES # BLD AUTO: 2.75 10*3/MM3 (ref 0.7–3.1)
LYMPHOCYTES NFR BLD AUTO: 30.7 % (ref 19.6–45.3)
MCH RBC QN AUTO: 29.2 PG (ref 26.6–33)
MCHC RBC AUTO-ENTMCNC: 32.4 G/DL (ref 31.5–35.7)
MCV RBC AUTO: 90.1 FL (ref 79–97)
MONOCYTES # BLD AUTO: 0.92 10*3/MM3 (ref 0.1–0.9)
MONOCYTES NFR BLD AUTO: 10.3 % (ref 5–12)
NEUTROPHILS # BLD AUTO: 4.79 10*3/MM3 (ref 1.7–7)
NEUTROPHILS NFR BLD AUTO: 53.5 % (ref 42.7–76)
NRBC BLD AUTO-RTO: 0 /100 WBC (ref 0–0.2)
PLATELET # BLD AUTO: 342 10*3/MM3 (ref 140–450)
PMV BLD AUTO: 8.8 FL (ref 6–12)
POTASSIUM BLD-SCNC: 4.8 MMOL/L (ref 3.5–5.2)
RBC # BLD AUTO: 4.14 10*6/MM3 (ref 3.77–5.28)
SODIUM BLD-SCNC: 136 MMOL/L (ref 136–145)
TIBC SERPL-MCNC: 340 MCG/DL (ref 298–536)
TRANSFERRIN SERPL-MCNC: 228 MG/DL (ref 200–360)
WBC NRBC COR # BLD: 8.95 10*3/MM3 (ref 3.4–10.8)

## 2020-03-09 PROCEDURE — 99232 SBSQ HOSP IP/OBS MODERATE 35: CPT | Performed by: INTERNAL MEDICINE

## 2020-03-09 PROCEDURE — 82962 GLUCOSE BLOOD TEST: CPT

## 2020-03-09 PROCEDURE — 80048 BASIC METABOLIC PNL TOTAL CA: CPT | Performed by: HOSPITALIST

## 2020-03-09 PROCEDURE — 63710000001 INSULIN DETEMIR PER 5 UNITS: Performed by: HOSPITALIST

## 2020-03-09 PROCEDURE — 63710000001 INSULIN ASPART PER 5 UNITS: Performed by: HOSPITALIST

## 2020-03-09 PROCEDURE — 85025 COMPLETE CBC W/AUTO DIFF WBC: CPT | Performed by: HOSPITALIST

## 2020-03-09 PROCEDURE — 84466 ASSAY OF TRANSFERRIN: CPT | Performed by: HOSPITALIST

## 2020-03-09 PROCEDURE — 83540 ASSAY OF IRON: CPT | Performed by: HOSPITALIST

## 2020-03-09 RX ORDER — AMIODARONE HYDROCHLORIDE 200 MG/1
200 TABLET ORAL
Status: DISCONTINUED | OUTPATIENT
Start: 2020-03-09 | End: 2020-03-10 | Stop reason: HOSPADM

## 2020-03-09 RX ADMIN — Medication 125 MG: at 17:41

## 2020-03-09 RX ADMIN — Medication 2 TABLET: at 09:05

## 2020-03-09 RX ADMIN — LEVOTHYROXINE SODIUM 100 MCG: 100 TABLET ORAL at 09:06

## 2020-03-09 RX ADMIN — SODIUM CHLORIDE, PRESERVATIVE FREE 10 ML: 5 INJECTION INTRAVENOUS at 21:05

## 2020-03-09 RX ADMIN — INSULIN ASPART 2 UNITS: 100 INJECTION, SOLUTION INTRAVENOUS; SUBCUTANEOUS at 21:05

## 2020-03-09 RX ADMIN — ASPIRIN 81 MG: 81 TABLET, COATED ORAL at 09:06

## 2020-03-09 RX ADMIN — HYDROCODONE BITARTRATE AND ACETAMINOPHEN 1 TABLET: 7.5; 325 TABLET ORAL at 21:05

## 2020-03-09 RX ADMIN — INSULIN ASPART 2 UNITS: 100 INJECTION, SOLUTION INTRAVENOUS; SUBCUTANEOUS at 11:44

## 2020-03-09 RX ADMIN — DULOXETINE HYDROCHLORIDE 60 MG: 60 CAPSULE, DELAYED RELEASE ORAL at 09:06

## 2020-03-09 RX ADMIN — AMIODARONE HYDROCHLORIDE 200 MG: 200 TABLET ORAL at 17:38

## 2020-03-09 RX ADMIN — GABAPENTIN 100 MG: 100 CAPSULE ORAL at 21:04

## 2020-03-09 RX ADMIN — INSULIN ASPART 2 UNITS: 100 INJECTION, SOLUTION INTRAVENOUS; SUBCUTANEOUS at 17:39

## 2020-03-09 RX ADMIN — APIXABAN 5 MG: 5 TABLET, FILM COATED ORAL at 09:06

## 2020-03-09 RX ADMIN — SODIUM CHLORIDE, PRESERVATIVE FREE 10 ML: 5 INJECTION INTRAVENOUS at 09:07

## 2020-03-09 RX ADMIN — LOSARTAN POTASSIUM 50 MG: 50 TABLET, FILM COATED ORAL at 09:06

## 2020-03-09 RX ADMIN — APIXABAN 5 MG: 5 TABLET, FILM COATED ORAL at 17:38

## 2020-03-09 RX ADMIN — AMLODIPINE BESYLATE 10 MG: 10 TABLET ORAL at 09:06

## 2020-03-09 RX ADMIN — INSULIN ASPART 10 UNITS: 100 INJECTION, SOLUTION INTRAVENOUS; SUBCUTANEOUS at 11:45

## 2020-03-09 RX ADMIN — METOPROLOL SUCCINATE 25 MG: 25 TABLET, FILM COATED, EXTENDED RELEASE ORAL at 09:06

## 2020-03-09 RX ADMIN — GABAPENTIN 100 MG: 100 CAPSULE ORAL at 09:05

## 2020-03-09 RX ADMIN — ATORVASTATIN CALCIUM 10 MG: 10 TABLET, FILM COATED ORAL at 09:06

## 2020-03-09 RX ADMIN — Medication 125 MG: at 11:43

## 2020-03-09 RX ADMIN — INSULIN ASPART 10 UNITS: 100 INJECTION, SOLUTION INTRAVENOUS; SUBCUTANEOUS at 17:39

## 2020-03-09 RX ADMIN — INSULIN DETEMIR 10 UNITS: 100 INJECTION, SOLUTION SUBCUTANEOUS at 21:05

## 2020-03-09 RX ADMIN — HYDROCODONE BITARTRATE AND ACETAMINOPHEN 1 TABLET: 7.5; 325 TABLET ORAL at 09:06

## 2020-03-09 RX ADMIN — GABAPENTIN 100 MG: 100 CAPSULE ORAL at 13:56

## 2020-03-09 RX ADMIN — MONTELUKAST SODIUM 10 MG: 10 TABLET, COATED ORAL at 21:05

## 2020-03-09 NOTE — PLAN OF CARE
Problem: Patient Care Overview  Goal: Plan of Care Review  Outcome: Ongoing (interventions implemented as appropriate)  Flowsheets  Taken 3/8/2020 0445 by Alejandrina Foley RN  Progress: no change  Taken 3/8/2020 2000 by Mick Beverly RN  Plan of Care Reviewed With: patient  Taken 3/7/2020 1906 by Kalpana Blkae, RN  Outcome Summary: VSS; pt dx with C. Diff and started on isolation; continue to monitor patient     Problem: Patient Care Overview  Goal: Individualization and Mutuality  Outcome: Ongoing (interventions implemented as appropriate)  Flowsheets (Taken 3/7/2020 1906 by Kalpana Blake, RN)  Patient Specific Preferences: patient likes the door open     Problem: Patient Care Overview  Goal: Discharge Needs Assessment  Outcome: Ongoing (interventions implemented as appropriate)  Flowsheets  Taken 3/7/2020 1404 by Kalpana Blake, RN  Equipment Currently Used at Home: none  Taken 3/7/2020 1410 by Kalpana Blake, RN  Transportation Anticipated: family or friend will provide  Patient/Family Anticipated Services at Transition: none  Patient/Family Anticipates Transition to: home     Problem: Patient Care Overview  Goal: Interprofessional Rounds/Family Conf  Outcome: Ongoing (interventions implemented as appropriate)     Problem: Urinary Tract Infection (Adult)  Goal: Signs and Symptoms of Listed Potential Problems Will be Absent, Minimized or Managed (Urinary Tract Infection)  Outcome: Ongoing (interventions implemented as appropriate)  Flowsheets (Taken 3/7/2020 1906 by Kalpana Blake, RN)  Problems Assessed (Urinary Tract Infection): all  Problems Present (UTI): none     Problem: Arrhythmia/Dysrhythmia (Symptomatic) (Adult)  Goal: Signs and Symptoms of Listed Potential Problems Will be Absent, Minimized or Managed (Arrhythmia/Dysrhythmia)  Outcome: Ongoing (interventions implemented as appropriate)  Flowsheets (Taken 3/7/2020 1906 by Kalpana Blake, RN)  Problems Assessed  (Arrhythmia/Dysrhythmia): all  Problems Present (Dysrhythmia): electrophysiologic conduction defect     Problem: Anxiety (Adult)  Goal: Identify Related Risk Factors and Signs and Symptoms  Outcome: Ongoing (interventions implemented as appropriate)     Problem: Anxiety (Adult)  Goal: Reduction/Resolution  Outcome: Ongoing (interventions implemented as appropriate)     Problem: Pain, Chronic (Adult)  Goal: Identify Related Risk Factors and Signs and Symptoms  Outcome: Ongoing (interventions implemented as appropriate)     Problem: Pain, Chronic (Adult)  Goal: Acceptable Pain/Comfort Level and Functional Ability  Outcome: Ongoing (interventions implemented as appropriate)  Flowsheets (Taken 3/7/2020 1906 by Kalpana Blake, RN)  Acceptable Pain/Comfort Level and Functional Ability: making progress toward outcome     Problem: Chronic Obstructive Pulmonary Disease (Adult)  Goal: Signs and Symptoms of Listed Potential Problems Will be Absent, Minimized or Managed (Chronic Obstructive Pulmonary Disease)  Outcome: Ongoing (interventions implemented as appropriate)  Flowsheets (Taken 3/7/2020 1906 by Kalpana Blake, RN)  Problems Assessed (Chronic Obstructive Pulmonary Disease (COPD)): all  Problems Present (COPD, Bronch/Emphy): none     Problem: Urinary Tract Infection (Adult)  Intervention: Prevent/Manage Infection Progression  Flowsheets (Taken 3/7/2020 2117 by Alejandrina Foley RN)  Fever Reduction/Comfort Measures: medication administered     Problem: Urinary Tract Infection (Adult)  Intervention: Promote Hydration  Flowsheets (Taken 3/8/2020 2000)  Fluid/Electrolyte Management: fluids provided     Problem: Urinary Tract Infection (Adult)  Intervention: Monitor/Manage Pain  Flowsheets  Taken 3/7/2020 2117 by Alejandrina Foley RN  Pain Management Interventions: see MAR  Taken 3/8/2020 2000 by Mick Beverly RN  Medication Review/Management: medications reviewed     Problem: Arrhythmia/Dysrhythmia  (Symptomatic) (Adult)  Intervention: Prevent/Manage Thrombi/Emboli  Flowsheets (Taken 3/9/2020 0000)  VTE Prevention/Management: -- (pt taking eliquis)     Problem: Arrhythmia/Dysrhythmia (Symptomatic) (Adult)  Intervention: Promote Hemodynamic Stability  Flowsheets  Taken 3/8/2020 2000  Fluid/Electrolyte Management: fluids provided  Sensory Stimulation Regulation: music/television provided for relaxation  Taken 3/9/2020 0000  Head of Bed (HOB): HOB at 20-30 degrees     Problem: Anxiety (Adult)  Intervention: Promote Anxiety Reduction and Resolution  Flowsheets (Taken 3/8/2020 2000)  Supportive Measures: active listening utilized  Sensory Stimulation Regulation: music/television provided for relaxation  Environmental Support: calm environment promoted     Problem: Pain, Chronic (Adult)  Intervention: Manage Persistent Pain Analgesia  Flowsheets (Taken 3/8/2020 2000)  Medication Review/Management: medications reviewed     Problem: Pain, Chronic (Adult)  Intervention: Support Psychosocial Response to Persistent Pain  Flowsheets (Taken 3/8/2020 2000)  Supportive Measures: active listening utilized  Diversional Activities: television     Problem: Pain, Chronic (Adult)  Intervention: Mutually Develop/Implement Persistent Pain Management Plan  Flowsheets  Taken 3/7/2020 2117 by Alejandrina Foley RN  Pain Management Interventions: see MAR  Taken 3/8/2020 2000 by Mick Beverly RN  Sensory Stimulation Regulation: music/television provided for relaxation     Problem: Chronic Obstructive Pulmonary Disease (Adult)  Intervention: Reduce/Relieve Breathlessness  Flowsheets (Taken 3/8/2020 2000)  Sensory Stimulation Regulation: music/television provided for relaxation  Environmental Support: calm environment promoted     Problem: Chronic Obstructive Pulmonary Disease (Adult)  Intervention: Prevent/Manage DVT/VTE Risk  Flowsheets (Taken 3/9/2020 0000)  VTE Prevention/Management: -- (pt taking eliquis)     Problem: Chronic  Obstructive Pulmonary Disease (Adult)  Intervention: Prevent/Manage Infection  Flowsheets (Taken 3/7/2020 2117 by Alejandrina Foley RN)  Fever Reduction/Comfort Measures: medication administered     Problem: Chronic Obstructive Pulmonary Disease (Adult)  Intervention: Optimize Functional Ability/Increase Activity Tolerance  Flowsheets (Taken 3/9/2020 0000)  Activity Management: up ad joey     Problem: Chronic Obstructive Pulmonary Disease (Adult)  Intervention: Optimize Oxygenation/Ventilation/Perfusion  Flowsheets (Taken 3/9/2020 0000)  Head of Bed (HOB): HOB at 20-30 degrees     Problem: Chronic Obstructive Pulmonary Disease (Adult)  Intervention: Support/Optimize Psychosocial Response to Chronic Pulmonary Disease  Flowsheets (Taken 3/8/2020 2000)  Supportive Measures: active listening utilized  Trust Relationship/Rapport: care explained;questions encouraged     Problem: Diabetes, Type 2 (Adult)  Intervention: Support/Optimize Psychosocial Response to Condition  Flowsheets (Taken 3/8/2020 2000)  Supportive Measures: active listening utilized     Problem: Diabetes, Type 2 (Adult)  Intervention: Optimize Glycemic Control  Flowsheets (Taken 3/8/2020 2000)  Glycemic Management: blood glucose monitoring

## 2020-03-09 NOTE — PLAN OF CARE
Problem: Patient Care Overview  Goal: Plan of Care Review  Outcome: Ongoing (interventions implemented as appropriate)  Flowsheets (Taken 3/9/2020 7142)  Plan of Care Reviewed With: patient  Outcome Summary: pt started on Amio and Metoprolol d/c'd, no problems today with pts HR- will continue to monitor

## 2020-03-09 NOTE — PROGRESS NOTES
LOS: 2 days   Patient Care Team:  Chantel Long MD as PCP - General (Family Medicine)    Chief Complaint: Patient admitted with generalized weakness and history of nonsustained wide QRS complex tachycardia/history of atrial tachycardia now  atrial fibrillation and monitor no further wide-complex arrhythmia noted with the previous normal stress test and good heart function receiving management for C. difficile       Review of Systems:   ROS  Constitutional: Positive for activity change and fatigue. Negative for appetite change, chills, fever and unexpected weight change.   HENT: Negative for congestion, facial swelling, hearing loss, nosebleeds, rhinorrhea, sneezing, trouble swallowing and voice change.    Eyes: Negative for photophobia and visual disturbance.   Respiratory:  Negative for apnea, cough, choking, chest tightness, wheezing and stridor.    Cardiovascular: Negative for chest pain, palpitations and leg swelling.   Gastrointestinal: Negative for abdominal pain,  Objective     Current Facility-Administered Medications   Medication Dose Route Frequency Provider Last Rate Last Dose   • amiodarone (PACERONE) tablet 200 mg  200 mg Oral Q24H Eagle Haywood MD       • amLODIPine (NORVASC) tablet 10 mg  10 mg Oral Daily Vinayak Posadas MD   10 mg at 03/09/20 0906   • apixaban (ELIQUIS) tablet 5 mg  5 mg Oral Q12H Robert Cosme MD PhD   5 mg at 03/09/20 0906   • aspirin EC tablet 81 mg  81 mg Oral Daily Vinayak Posadas MD   81 mg at 03/09/20 0906   • atorvastatin (LIPITOR) tablet 10 mg  10 mg Oral Daily Vinayak Posadas MD   10 mg at 03/09/20 0906   • calcium carbonate-vitamin d 600-400 MG-UNIT per tablet 2 tablet  2 tablet Oral Daily Vinayak Posadas MD   2 tablet at 03/09/20 0905   • dextrose (D50W) 25 g/ 50mL Intravenous Solution 25 g  25 g Intravenous Q15 Min PRN Vinayak Posadas MD       • dextrose (GLUTOSE) oral gel 15 g  15 g Oral Q15 Min PRN Vinayak Poasdas MD       •  DULoxetine (CYMBALTA) DR capsule 60 mg  60 mg Oral Daily Vinayak Posadas MD   60 mg at 03/09/20 0906   • gabapentin (NEURONTIN) capsule 100 mg  100 mg Oral Q8H Vinayak Posadas MD   100 mg at 03/09/20 1356   • glucagon (human recombinant) (GLUCAGEN DIAGNOSTIC) injection 1 mg  1 mg Subcutaneous Q15 Min PRN Vinayak Posadas MD       • HYDROcodone-acetaminophen (NORCO) 7.5-325 MG per tablet 1 tablet  1 tablet Oral BID - RT Vinayak Posadas MD   1 tablet at 03/09/20 0906   • insulin aspart (novoLOG) injection 0-9 Units  0-9 Units Subcutaneous 4x Daily AC & at Bedtime Vinayak Posadas MD   2 Units at 03/09/20 1144   • insulin aspart (novoLOG) injection 10 Units  10 Units Subcutaneous TID With Meals Vinayak Posadas MD   10 Units at 03/09/20 1145   • insulin detemir (LEVEMIR) injection 10 Units  10 Units Subcutaneous Nightly Vinayak Posadas MD   10 Units at 03/08/20 2042   • levothyroxine (SYNTHROID, LEVOTHROID) tablet 100 mcg  100 mcg Oral Daily Vinayak Posadas MD   100 mcg at 03/09/20 0906   • LORazepam (ATIVAN) tablet 1 mg  1 mg Oral Nightly PRN Vinayak Posadas MD       • losartan (COZAAR) tablet 50 mg  50 mg Oral Q24H Vinayak Posadas MD   50 mg at 03/09/20 0906   • montelukast (SINGULAIR) tablet 10 mg  10 mg Oral Nightly Vinayak Posadas MD   10 mg at 03/08/20 2041   • Pharmacy Consult   Does not apply Continuous PRN Vinayak Posadas MD       • sodium chloride 0.9 % flush 10 mL  10 mL Intravenous Q12H Vinayak Posadas MD   10 mL at 03/09/20 0907   • sodium chloride 0.9 % flush 10 mL  10 mL Intravenous PRN Vinayak Posadas MD       • vancomycin oral solution reconstituted 125 mg  125 mg Oral Q6H Vinayak Posadas MD   125 mg at 03/09/20 1143       Vital Sign Min/Max for last 24 hours  Temp  Min: 97.1 °F (36.2 °C)  Max: 97.8 °F (36.6 °C)   BP  Min: 128/71  Max: 151/68   Pulse  Min: 71  Max: 84   Resp  Min: 18  Max: 18   SpO2  Min: 91 %  Max: 96 %   No data recorded   Weight  Min: 80.9  "kg (178 lb 6.4 oz)  Max: 80.9 kg (178 lb 6.4 oz)     Flowsheet Rows      First Filed Value   Admission Height  170.2 cm (67\") Documented at 03/07/2020 1336   Admission Weight  86.2 kg (190 lb) Documented at 03/07/2020 1056            Intake/Output Summary (Last 24 hours) at 3/9/2020 1507  Last data filed at 3/9/2020 1428  Gross per 24 hour   Intake 660 ml   Output --   Net 660 ml       Physical Exam:     General Appearance:   Alert, cooperative, in no acute distress   Lungs:    Clear to auscultation, respirations regular, even and               unlabored    Heart:   Irregular rhythm and normal rate, normal S1 and S2, no         murmur, no gallop, no rub, no click   Chest Wall:   No abnormalities observed   Abdomen:    Normal bowel sounds, no masses, no organomegaly, soft     nontender, nondistended, no guarding, no rebound                tenderness   Extremities:  Moves all extremities well, no edema, no cyanosis, no           redness   Pulses:  Pulses palpable and equal bilaterally   Skin:  No bleeding, bruising or rash        Results Review:   I reviewed the patient's new clinical results.  Lab Results   Component Value Date    WBC 8.95 03/09/2020    HGB 12.1 03/09/2020    HCT 37.3 03/09/2020    MCV 90.1 03/09/2020     03/09/2020     Lab Results   Component Value Date    GLUCOSE 170 (H) 03/09/2020    BUN 14 03/09/2020    CREATININE 0.73 03/09/2020    EGFRIFNONA 76 03/09/2020    BCR 19.2 03/09/2020    CO2 23.0 03/09/2020    CALCIUM 9.4 03/09/2020    ALBUMIN 4.40 02/06/2020    AST 22 02/06/2020    ALT 17 02/06/2020     Lab Results   Component Value Date    TSH 0.976 02/06/2020     No results found for: INR, PROTIME  Lab Results   Component Value Date    PTT 26.9 03/06/2020       EKG:     Telemetry:    Ejection Fraction  Lab Results   Component Value Date    EF 70 09/27/2018       Echo EF Estimated  Lab Results   Component Value Date    ECHOEFEST 60 04/18/2018         Present on Admission:  • Chronic " obstructive pulmonary disease (CMS/HCC)  • Atrial fibrillation with RVR (CMS/HCC)  • Diabetes mellitus (CMS/[facility])    Assessment/Plan   Atrial fibrillation with variable ventricular rate  #2 history of atrial tachycardia and nonsustained wide QRS complex rhythm with a normal stress test in the past  3 hypertension with hypertensive heart disease  #4 C. difficile positive    84 years old patient admitted for evaluation of generalized weakness on the monitor noted to have wide complex rhythm nonsustained with underlying atrial fibrillation the possibility of aberrant conduction cannot be excluded.  The monitor reviewed by me there is no evidence of ventricular tachycardia ,she had atrial fibrillation.  The patient was evaluated with Dr. Cosme and oral anticoagulation was initiated.  I will discontinue beta-blocker and start the patient on amiodarone 200 mg a day.  Is on oral vancomycin for management of C. difficile, amlodipine and losartan for management of hypertension with hypertensive heart disease, statin for hyperlipidemia  Eagle Haywood MD  03/09/20  3:07 PM      Part of this note may be an electronic transcription/translation of spoken language to printed text using the Dragon Dictation system.

## 2020-03-09 NOTE — TELEPHONE ENCOUNTER
PT IS HOSPITALIZED AT THIS TIME.  SHE IS SCHEDULED FOR [facility] REPAIR ON 3-12-20 AND PT WANTED [facility] TO CALL AND LET DR MCKEON KNOW SHE MAY NOT BE OUT OF HOSPITAL BY THEN.

## 2020-03-09 NOTE — PROGRESS NOTES
Progress Note  Duarte Hsu MD  Hospitalist    Date of visit: 3/9/2020     LOS: 2 days   Patient Care Team:  Chantel Long MD as PCP - General (Family Medicine)    Chief Complaint: palpitations    Subjective     Interval History:     Patient Complaints: palpitations - improved once her heart rate has improved    History taken from: patient / nursing    Medication Review:   Current Facility-Administered Medications   Medication Dose Route Frequency Provider Last Rate Last Dose   • amiodarone (PACERONE) tablet 200 mg  200 mg Oral Q24H Eagle Haywood MD       • amLODIPine (NORVASC) tablet 10 mg  10 mg Oral Daily Vinayak Posadas MD   10 mg at 03/09/20 0906   • apixaban (ELIQUIS) tablet 5 mg  5 mg Oral Q12H Robert Cosme MD PhD   5 mg at 03/09/20 0906   • aspirin EC tablet 81 mg  81 mg Oral Daily Vinayak Posadas MD   81 mg at 03/09/20 0906   • atorvastatin (LIPITOR) tablet 10 mg  10 mg Oral Daily Vinayak Posadas MD   10 mg at 03/09/20 0906   • calcium carbonate-vitamin d 600-400 MG-UNIT per tablet 2 tablet  2 tablet Oral Daily Vinayak Posadas MD   2 tablet at 03/09/20 0905   • dextrose (D50W) 25 g/ 50mL Intravenous Solution 25 g  25 g Intravenous Q15 Min PRN Vinayak Posadas MD       • dextrose (GLUTOSE) oral gel 15 g  15 g Oral Q15 Min PRN Vinayak Posadas MD       • DULoxetine (CYMBALTA) DR capsule 60 mg  60 mg Oral Daily Vinayak Posadas MD   60 mg at 03/09/20 0906   • gabapentin (NEURONTIN) capsule 100 mg  100 mg Oral Q8H Vinayak Posadas MD   100 mg at 03/09/20 1356   • glucagon (human recombinant) (GLUCAGEN DIAGNOSTIC) injection 1 mg  1 mg Subcutaneous Q15 Min PRN Vinayak Posadas MD       • HYDROcodone-acetaminophen (NORCO) 7.5-325 MG per tablet 1 tablet  1 tablet Oral BID - RT Vinayak Posadas MD   1 tablet at 03/09/20 0906   • insulin aspart (novoLOG) injection 0-9 Units  0-9 Units Subcutaneous 4x Daily AC & at Bedtime Vinayak Posadas MD   2 Units at 03/09/20 1144   •  insulin aspart (novoLOG) injection 10 Units  10 Units Subcutaneous TID With Meals Vinayak Posadas MD   10 Units at 03/09/20 1145   • insulin detemir (LEVEMIR) injection 10 Units  10 Units Subcutaneous Nightly Vinayak Posadas MD   10 Units at 03/08/20 2042   • levothyroxine (SYNTHROID, LEVOTHROID) tablet 100 mcg  100 mcg Oral Daily Vinayak Posadas MD   100 mcg at 03/09/20 0906   • LORazepam (ATIVAN) tablet 1 mg  1 mg Oral Nightly PRN Vinayak Posadas MD       • losartan (COZAAR) tablet 50 mg  50 mg Oral Q24H Vinayak Posadas MD   50 mg at 03/09/20 0906   • montelukast (SINGULAIR) tablet 10 mg  10 mg Oral Nightly Vinayak Posadas MD   10 mg at 03/08/20 2041   • Pharmacy Consult   Does not apply Continuous PRN Vinayak Posadas MD       • sodium chloride 0.9 % flush 10 mL  10 mL Intravenous Q12H Vinayak Posadas MD   10 mL at 03/09/20 0907   • sodium chloride 0.9 % flush 10 mL  10 mL Intravenous PRN Vinayak Posadas MD       • vancomycin oral solution reconstituted 125 mg  125 mg Oral Q6H Vinayak Posadas MD   125 mg at 03/09/20 1143       Review of Systems:   Review of Systems   Constitutional: Positive for fatigue.   Respiratory: Positive for cough. Negative for shortness of breath and wheezing.    Cardiovascular: Positive for palpitations. Negative for chest pain and leg swelling.   Gastrointestinal: Negative for abdominal distention, blood in stool, diarrhea, nausea and vomiting.   Genitourinary: Negative for dysuria, frequency, menstrual problem and urgency.   Musculoskeletal: Positive for arthralgias. Negative for back pain and joint swelling.   Skin: Positive for pallor. Negative for color change and rash.   Neurological: Positive for weakness. Negative for seizures, syncope and light-headedness.   Psychiatric/Behavioral: Negative for agitation, behavioral problems and confusion.   All other systems reviewed and are negative.      Objective     Vital Signs  Temp:  [97.1 °F (36.2 °C)-97.5 °F  (36.4 °C)] 97.2 °F (36.2 °C)  Heart Rate:  [71-83] 83  Resp:  [18] 18  BP: (128-151)/(68-84) 130/81    Physical Exam:  Physical Exam   Constitutional: She is oriented to person, place, and time. She appears well-developed and well-nourished. No distress.   HENT:   Head: Normocephalic and atraumatic.   Eyes: Pupils are equal, round, and reactive to light. EOM are normal. No scleral icterus.   Neck: Normal range of motion. Neck supple.   Cardiovascular: Normal rate. An irregular rhythm present.   Pulmonary/Chest: Effort normal and breath sounds normal. No respiratory distress.   Abdominal: Soft. Bowel sounds are normal. She exhibits no distension. There is no tenderness. There is no guarding.   Musculoskeletal: Normal range of motion. She exhibits no edema or tenderness.   Neurological: She is alert and oriented to person, place, and time. No cranial nerve deficit. Coordination normal.   Skin: Skin is warm and dry. No rash noted. There is pallor.   Psychiatric: She has a normal mood and affect. Her behavior is normal.   Vitals reviewed.       Results Review:    Lab Results (last 24 hours)     Procedure Component Value Units Date/Time    POC Glucose Once [[id number]]  (Abnormal) Collected:  03/09/20 1109    Specimen:  Blood Updated:  03/09/20 1129     Glucose 165 mg/dL      Comment: RN NotifiedOperator: [id number] CHARMAINE BRANDYMeter ID: [id number]       POC Glucose Once [[id number]]  (Abnormal) Collected:  03/09/20 0619    Specimen:  Blood Updated:  03/09/20 0701     Glucose 152 mg/dL      Comment: RN NotifiedOperator: [id number] [facility]: [id number]       Basic Metabolic Panel [[id number]]  (Abnormal) Collected:  03/09/20 0626    Specimen:  Blood Updated:  03/09/20 0656     Glucose 170 mg/dL      BUN 14 mg/dL      Creatinine 0.73 mg/dL      Sodium 136 mmol/L      Potassium 4.8 mmol/L      Chloride 99 mmol/L      CO2 23.0 mmol/L      Calcium 9.4 mg/dL      eGFR Non African Amer 76 mL/min/1.73       BUN/Creatinine Ratio 19.2     Anion Gap 14.0 mmol/L     Narrative:       GFR Normal >60  Chronic Kidney Disease <60  Kidney Failure <15      Iron Profile [[id number]]  (Abnormal) Collected:  03/09/20 0626    Specimen:  Blood Updated:  03/09/20 0656     Iron 42 mcg/dL      Iron Saturation 12 %      Transferrin 228 mg/dL      TIBC 340 mcg/dL     CBC & Differential [[id number]] Collected:  03/09/20 0626    Specimen:  Blood Updated:  03/09/20 0639    Narrative:       The following orders were created for panel order CBC & Differential.  Procedure                               Abnormality         Status                     ---------                               -----------         ------                     CBC Auto Differential[[id number]]        Abnormal            Final result                 Please view results for these tests on the individual orders.    CBC Auto Differential [[id number]]  (Abnormal) Collected:  03/09/20 0626    Specimen:  Blood Updated:  03/09/20 0639     WBC 8.95 10*3/mm3      RBC 4.14 10*6/mm3      Hemoglobin 12.1 g/dL      Hematocrit 37.3 %      MCV 90.1 fL      MCH 29.2 pg      MCHC 32.4 g/dL      RDW 13.2 %      RDW-SD 43.5 fl      MPV 8.8 fL      Platelets 342 10*3/mm3      Neutrophil % 53.5 %      Lymphocyte % 30.7 %      Monocyte % 10.3 %      Eosinophil % 4.0 %      Basophil % 0.6 %      Immature Grans % 0.9 %      Neutrophils, Absolute 4.79 10*3/mm3      Lymphocytes, Absolute 2.75 10*3/mm3      Monocytes, Absolute 0.92 10*3/mm3      Eosinophils, Absolute 0.36 10*3/mm3      Basophils, Absolute 0.05 10*3/mm3      Immature Grans, Absolute 0.08 10*3/mm3      nRBC 0.0 /100 WBC     POC Glucose Once [[id number]]  (Abnormal) Collected:  03/08/20 2034    Specimen:  Blood Updated:  03/08/20 2139     Glucose 245 mg/dL      Comment: RN NotifiedOperator: [id number] LAST STOCKTONNIFERMeter ID: [id number]       POC Glucose Once [[id number]]  (Normal) Collected:  03/08/20 1617    Specimen:  Blood Updated:   03/08/20 1637     Glucose 90 mg/dL      Comment: RN NotifiedOperator: [id number] SANGEETHA NOMeter ID: [id number]             Imaging Results (Last 24 Hours)     ** No results found for the last 24 hours. **          Assessment/Plan       Atrial fibrillation with RVR (CMS/HCC)    Diabetes mellitus (CMS/HCC)    Chronic obstructive pulmonary disease (CMS/HCC)    Continue to monitor on telemetry, the current medications, including the Amiodarone and Eliquis.    Duarte Hsu MD  03/09/20  4:17 PM

## 2020-03-09 NOTE — PROGRESS NOTES
Discharge Planning Assessment  [facility]     Patient Name: Nyla Piña  MRN: [id number]  Today's Date: 3/9/2020    Admit Date: 3/7/2020    Discharge Needs Assessment     Row Name 03/09/20 1541       Living Environment    Lives With  alone    Current Living Arrangements  home/apartment/condo Contact information on face sheet confirmed.     Primary Care Provided by  self    Provides Primary Care For  no one    Family Caregiver if Needed  none    Quality of Family Relationships  involved    Able to Return to Prior Arrangements  yes       Resource/Environmental Concerns    Resource/Environmental Concerns  none    Transportation Concerns  car, none       Transition Planning    Patient/Family Anticipates Transition to  home    Patient/Family Anticipated Services at Transition  none    Transportation Anticipated  car, drives self;family or friend will provide       Discharge Needs Assessment    Readmission Within the Last 30 Days  no previous admission in last 30 days    Concerns to be Addressed  denies needs/concerns at this time    Equipment Currently Used at Home  cane, straight;walker, rolling;glucometer;shower chair    Anticipated Changes Related to Illness  none    Equipment Needed After Discharge  none    Discharge Facility/Level of Care Needs  home with home health    Provided Post Acute Provider List?  N/A    N/A Provider List Comment  transition visit    Patient's Choice of Community Agency(s)  [facility] home health     Current Discharge Risk  chronically ill        Discharge Plan     Row Name 03/09/20 1542       Plan    Plan  home with home health: transition visit    Plan Comments  LACE complete. DARA discussed and offered a home health transition visit. Patient was agreeable. DARA left a note for the provider requesting service for a transition visit. No additional needs presented and/or voiced at this time. Continue [facility] medical management......Joanne [facility]        Destination       Coordination has not been started for this encounter.      Durable Medical Equipment      Coordination has not been started for this encounter.      Dialysis/Infusion      Coordination has not been started for this encounter.      Home Medical Care      Coordination has not been started for this encounter.      Therapy      Coordination has not been started for this encounter.      Community Resources      Coordination has not been started for this encounter.        Expected Discharge Date and Time     Expected Discharge Date Expected Discharge Time    Mar 13, 2020         Demographic Summary     Row Name 03/09/20 1530       General Information    Admission Type  inpatient    Arrived From  other (see comments) Tranfer from [facility].    Referral Source  high risk screening LACE score 11.    Preferred Language  English     Used During This Interaction  no       Contact Information    Contact Information Obtained for          Functional Status     Row Name 03/09/20 1515       Functional Status    Usual Activity Tolerance  good Patient rated self.    Current Activity Tolerance  fair Patient rates self.     Functional Status Comments  Patient voiced independence with ADL's. Patient uses a rolling walker or cane for assist with ambulation outside of her home. Patient is independent with bathing and dressing. Patient uses a shower chair for bathing.        Functional Status, IADL    Medications  [facility], and prescription coverage confirmed.     Meal Preparation  independent    Housekeeping  assistive person Patient has hired assistance for housekeeping services.     Laundry  independent    Shopping  independent       Mental Status Summary    Recent Changes in Mental Status/Cognitive Functioning  no changes       Employment/    Employment Status  retired        Psychosocial    No documentation.       Abuse/Neglect    No documentation.        Legal    No documentation.       Substance Abuse    No documentation.       Patient Forms    No documentation.           TEJAS Bass

## 2020-03-10 VITALS
SYSTOLIC BLOOD PRESSURE: 134 MMHG | DIASTOLIC BLOOD PRESSURE: 82 MMHG | OXYGEN SATURATION: 95 % | HEIGHT: 67 IN | HEART RATE: 93 BPM | WEIGHT: 177.6 LBS | RESPIRATION RATE: 18 BRPM | BODY MASS INDEX: 27.88 KG/M2 | TEMPERATURE: 97.9 F

## 2020-03-10 PROBLEM — A49.8 KLEBSIELLA INFECTION: Status: ACTIVE | Noted: 2020-03-10

## 2020-03-10 PROBLEM — A04.72 C. DIFFICILE DIARRHEA: Status: ACTIVE | Noted: 2020-03-10

## 2020-03-10 PROBLEM — A49.8 KLEBSIELLA INFECTION: Chronic | Status: ACTIVE | Noted: 2020-03-10

## 2020-03-10 LAB
ANION GAP SERPL CALCULATED.3IONS-SCNC: 13 MMOL/L (ref 5–15)
BASOPHILS # BLD AUTO: 0.05 10*3/MM3 (ref 0–0.2)
BASOPHILS NFR BLD AUTO: 0.6 % (ref 0–1.5)
BUN BLD-MCNC: 13 MG/DL (ref 8–23)
BUN/CREAT SERPL: 18.8 (ref 7–25)
CALCIUM SPEC-SCNC: 9 MG/DL (ref 8.6–10.5)
CHLORIDE SERPL-SCNC: 99 MMOL/L (ref 98–107)
CO2 SERPL-SCNC: 22 MMOL/L (ref 22–29)
CREAT BLD-MCNC: 0.69 MG/DL (ref 0.57–1)
DEPRECATED RDW RBC AUTO: 42 FL (ref 37–54)
EOSINOPHIL # BLD AUTO: 0.29 10*3/MM3 (ref 0–0.4)
EOSINOPHIL NFR BLD AUTO: 3.7 % (ref 0.3–6.2)
ERYTHROCYTE [DISTWIDTH] IN BLOOD BY AUTOMATED COUNT: 13.1 % (ref 12.3–15.4)
GFR SERPL CREATININE-BSD FRML MDRD: 81 ML/MIN/1.73
GLUCOSE BLD-MCNC: 213 MG/DL (ref 65–99)
GLUCOSE BLDC GLUCOMTR-MCNC: 192 MG/DL (ref 70–130)
HCT VFR BLD AUTO: 32.9 % (ref 34–46.6)
HGB BLD-MCNC: 10.7 G/DL (ref 12–15.9)
IMM GRANULOCYTES # BLD AUTO: 0.07 10*3/MM3 (ref 0–0.05)
IMM GRANULOCYTES NFR BLD AUTO: 0.9 % (ref 0–0.5)
LYMPHOCYTES # BLD AUTO: 2.09 10*3/MM3 (ref 0.7–3.1)
LYMPHOCYTES NFR BLD AUTO: 26.6 % (ref 19.6–45.3)
MCH RBC QN AUTO: 28.9 PG (ref 26.6–33)
MCHC RBC AUTO-ENTMCNC: 32.5 G/DL (ref 31.5–35.7)
MCV RBC AUTO: 88.9 FL (ref 79–97)
MONOCYTES # BLD AUTO: 0.84 10*3/MM3 (ref 0.1–0.9)
MONOCYTES NFR BLD AUTO: 10.7 % (ref 5–12)
NEUTROPHILS # BLD AUTO: 4.51 10*3/MM3 (ref 1.7–7)
NEUTROPHILS NFR BLD AUTO: 57.5 % (ref 42.7–76)
NRBC BLD AUTO-RTO: 0 /100 WBC (ref 0–0.2)
PLATELET # BLD AUTO: 302 10*3/MM3 (ref 140–450)
PMV BLD AUTO: 8.8 FL (ref 6–12)
POTASSIUM BLD-SCNC: 4.4 MMOL/L (ref 3.5–5.2)
RBC # BLD AUTO: 3.7 10*6/MM3 (ref 3.77–5.28)
SODIUM BLD-SCNC: 134 MMOL/L (ref 136–145)
WBC NRBC COR # BLD: 7.85 10*3/MM3 (ref 3.4–10.8)

## 2020-03-10 PROCEDURE — 82962 GLUCOSE BLOOD TEST: CPT

## 2020-03-10 PROCEDURE — 93010 ELECTROCARDIOGRAM REPORT: CPT | Performed by: INTERNAL MEDICINE

## 2020-03-10 PROCEDURE — 99232 SBSQ HOSP IP/OBS MODERATE 35: CPT | Performed by: INTERNAL MEDICINE

## 2020-03-10 PROCEDURE — 93005 ELECTROCARDIOGRAM TRACING: CPT | Performed by: INTERNAL MEDICINE

## 2020-03-10 PROCEDURE — 80048 BASIC METABOLIC PNL TOTAL CA: CPT | Performed by: HOSPITALIST

## 2020-03-10 PROCEDURE — 63710000001 INSULIN ASPART PER 5 UNITS: Performed by: HOSPITALIST

## 2020-03-10 PROCEDURE — 85025 COMPLETE CBC W/AUTO DIFF WBC: CPT | Performed by: HOSPITALIST

## 2020-03-10 RX ORDER — AMIODARONE HYDROCHLORIDE 200 MG/1
200 TABLET ORAL
Qty: 30 TABLET | Refills: 1 | Status: SHIPPED | OUTPATIENT
Start: 2020-03-11 | End: 2020-05-20 | Stop reason: SDUPTHER

## 2020-03-10 RX ADMIN — APIXABAN 5 MG: 5 TABLET, FILM COATED ORAL at 06:05

## 2020-03-10 RX ADMIN — Medication 125 MG: at 06:06

## 2020-03-10 RX ADMIN — Medication 2 TABLET: at 09:07

## 2020-03-10 RX ADMIN — INSULIN ASPART 2 UNITS: 100 INJECTION, SOLUTION INTRAVENOUS; SUBCUTANEOUS at 09:08

## 2020-03-10 RX ADMIN — Medication 125 MG: at 00:40

## 2020-03-10 RX ADMIN — LEVOTHYROXINE SODIUM 100 MCG: 100 TABLET ORAL at 09:07

## 2020-03-10 RX ADMIN — HYDROCODONE BITARTRATE AND ACETAMINOPHEN 1 TABLET: 7.5; 325 TABLET ORAL at 09:07

## 2020-03-10 RX ADMIN — AMIODARONE HYDROCHLORIDE 200 MG: 200 TABLET ORAL at 09:07

## 2020-03-10 RX ADMIN — AMLODIPINE BESYLATE 10 MG: 10 TABLET ORAL at 09:07

## 2020-03-10 RX ADMIN — DULOXETINE HYDROCHLORIDE 60 MG: 60 CAPSULE, DELAYED RELEASE ORAL at 09:07

## 2020-03-10 RX ADMIN — INSULIN ASPART 10 UNITS: 100 INJECTION, SOLUTION INTRAVENOUS; SUBCUTANEOUS at 09:08

## 2020-03-10 RX ADMIN — ASPIRIN 81 MG: 81 TABLET, COATED ORAL at 09:07

## 2020-03-10 RX ADMIN — SODIUM CHLORIDE, PRESERVATIVE FREE 10 ML: 5 INJECTION INTRAVENOUS at 09:08

## 2020-03-10 RX ADMIN — ATORVASTATIN CALCIUM 10 MG: 10 TABLET, FILM COATED ORAL at 09:08

## 2020-03-10 RX ADMIN — LOSARTAN POTASSIUM 50 MG: 50 TABLET, FILM COATED ORAL at 09:08

## 2020-03-10 RX ADMIN — GABAPENTIN 100 MG: 100 CAPSULE ORAL at 06:05

## 2020-03-10 NOTE — DISCHARGE SUMMARY
Discharge Summary  Duarte Hsu MD  Hospitalist     Date of Discharge:  3/10/2020    Discharge Diagnosis:      Atrial fibrillation with RVR (CMS/[facility])    Diabetes mellitus (CMS/[facility])    UTI (urinary tract infection), bacterial    Chronic obstructive pulmonary disease (CMS/[facility])    C. difficile diarrhea    Klebsiella infection      Presenting Problem/History of Present Illness  Palpitation/chest discomfort    Hospital Course  Patient is a 84 y.o. female admitted for palpitation/chest discomfort due to atrial fibrillation with a rapid ventricular rate.  Her symptoms improved once her rate became controlled with the help of Amiodarone.  She is stable at present, her heart rate is in the 80/min, her blood pressure is 145/85 and her O2 saturation on room air is 96%.    The patient was previously treated for the Klebsiella UTI with oral Bactrim, antibiotic that she claims could have been the reason for her potential diagnosis of C. difficile diarrhea (reportedly discovered at the [facility] ER, of which no evidence exists in the available records). During this hospital stay she has remained entirely asymptomatic (no diarrhea, no urinary tract symptoms), afebrile and her white count is consistently within normal limit.    She will be followed by home health at least for a transitional visit to follow-up with her primary care doctor within the next week.    Consults:   Consults     Date and Time Order Name Status Description    3/7/2020 1617 Inpatient Cardiology Consult Completed           Pertinent Test Results: Rate controlled atrial fibrillation on the ECG and a good LVEF on the Echo. Klebsiella present in a previous urine culture.    Condition on Discharge: Stable    Vital Signs  Temp:  [97 °F (36.1 °C)-98.1 °F (36.7 °C)] 97 °F (36.1 °C)  Heart Rate:  [74-86] 86  Resp:  [18] 18  BP: (120-153)/(62-87) 145/87    Physical Exam:  Physical Exam   Constitutional: She is oriented to person, place, and time. She  appears well-developed and well-nourished. No distress.   HENT:   Head: Normocephalic and atraumatic.   Eyes: Pupils are equal, round, and reactive to light. EOM are normal. No scleral icterus.   Neck: Normal range of motion. Neck supple.   Cardiovascular: Normal rate. An irregular rhythm present.   Pulmonary/Chest: Effort normal and breath sounds normal. She has no wheezes.   Abdominal: Soft. Bowel sounds are normal. She exhibits no distension. There is no guarding.   Musculoskeletal: Normal range of motion. She exhibits no edema, tenderness or deformity.   Neurological: She is alert and oriented to person, place, and time. She displays normal reflexes. No cranial nerve deficit. Coordination normal.   Skin: Skin is warm and dry. There is pallor.   Psychiatric: She has a normal mood and affect. Her behavior is normal.   Vitals reviewed.      Discharge Disposition  Home-Health Care [place]    Discharge Medications     Discharge Medications      New Medications      Instructions Start Date   amiodarone 200 MG tablet  Commonly known as:  PACERONE   200 mg, Oral, Every 24 Hours Scheduled   Start Date:  March 11, 2020     apixaban 5 MG tablet tablet  Commonly known as:  ELIQUIS   5 mg, Oral, Every 12 Hours Scheduled         Changes to Medications      Instructions Start Date   metoprolol succinate XL 25 MG 24 hr tablet  Commonly known as:  TOPROL-XL  What changed:  how much to take   25 mg, Oral, Daily         Continue These Medications      Instructions Start Date   amLODIPine 10 MG tablet  Commonly known as:  NORVASC   10 mg, Oral, Daily      aspirin 81 MG tablet   81 mg, Oral, Daily      B-D ULTRAFINE III SHORT PEN 31G X 8 MM misc  Generic drug:  Insulin Pen Needle   Use and discard 1 pen needle 4 times daily.      CALCIUM 600 PO   2 tablets, Oral, Daily      diclofenac 1 % gel gel  Commonly known as:  VOLTAREN   4 g, Topical, 4 Times Daily      DULoxetine 60 MG capsule  Commonly known as:  CYMBALTA   60 mg, Oral, Daily    "   gabapentin 300 MG capsule  Commonly known as:  NEURONTIN   TAKE ONE CAPSULE BY MOUTH THREE TIMES A DAY      HYDROcodone-acetaminophen 7.5-325 MG per tablet  Commonly known as:  NORCO   1 tablet, Oral, 2 Times Daily - RT      insulin aspart 100 UNIT/ML solution pen-injector sc pen  Commonly known as:  novoLOG FLEXPEN   Take 15 units TID before each meal      Insulin Glargine 100 UNIT/ML injection pen  Commonly known as:  BASAGLAR KWIKPEN   Inject 15 Units under the skin Every Night.      irbesartan 150 MG tablet  Commonly known as:  AVAPRO   150 mg, Oral, Nightly      levothyroxine 100 MCG tablet  Commonly known as:  SYNTHROID, LEVOTHROID   TAKE 1 TABLET DAILY      LORazepam 1 MG tablet  Commonly known as:  ATIVAN   1 mg, Oral, Nightly PRN      metFORMIN 850 MG tablet  Commonly known as:  GLUCOPHAGE   850 mg, Oral, 2 Times Daily With Meals      montelukast 10 MG tablet  Commonly known as:  SINGULAIR   10 mg, Oral, Nightly      Needle (Disp) 31G X 5/16\" misc   1 each, Does not apply, 4 Times Daily, Pen Needle;        simvastatin 10 MG tablet  Commonly known as:  ZOCOR   20 mg, Oral, Daily      TRUE METRIX BLOOD GLUCOSE TEST test strip  Generic drug:  glucose blood   USE TO TEST FOUR TIMES DAILY      TRUEPLUS LANCETS 28G misc   TEST FOUR TIMES A DAY      vitamin D 1.25 MG (63610 UT) capsule capsule  Commonly known as:  ERGOCALCIFEROL   TAKE ONE CAPSULE BY MOUTH ONCE WEEKLY         Stop These Medications    sulfamethoxazole-trimethoprim 800-160 MG per tablet  Commonly known as:  BACTRIM DS            Discharge Diet:   Diet Instructions     Diet: Regular, Cardiac      Discharge Diet:   Regular  Cardiac             Activity at Discharge:   Activity Instructions     Activity as Tolerated            Follow-up Appointments  Future Appointments   Date Time Provider Department Center   3/16/2020  2:45 PM Wood Flores DPM [facility] POD MAD None   3/17/2020 10:00 AM Chantel Long MD [facility] PC POW None   3/23/2020  1:30 PM " Eyad Queen APRN [facility] END MAD None   3/25/2020 11:00 AM Wood Flores DPM [facility] POD MAD None   4/21/2020 11:30 AM Eagle Haywood MD [facility] CD MAD None     Additional Instructions for the Follow-ups that You Need to Schedule     Discharge Follow-up with PCP   As directed       Currently Documented PCP:    Chantel Long MD    PCP Phone Number:    [phone]     Follow Up Details:  in 1 week         Discharge Follow-up with Specified Provider: Dr. Haywood; 1 Month   As directed      To:  Dr. Haywood    Follow Up:  1 Month         Referral to Home Health   As directed      Face to Face Visit Date:  3/10/2020    Follow-up provider for Plan of Care?:  I treated the patient in an acute care facility and will not continue treatment after discharge.    Follow-up provider:  CHANTEL LONG [[id number]]    Reason/Clinical Findings:  AFib    Describe mobility limitations that make leaving home difficult:  Afib    Nursing/Therapeutic Services Requested:  Other (transitions visit)    Frequency:  1 Week 1                Duarte Hsu MD  03/10/20  10:26

## 2020-03-10 NOTE — PLAN OF CARE
Problem: Patient Care Overview  Goal: Plan of Care Review  Outcome: Ongoing (interventions implemented as appropriate)  Flowsheets (Taken 3/10/2020 3442)  Progress: improving  Plan of Care Reviewed With: patient

## 2020-03-10 NOTE — PROGRESS NOTES
LOS: 3 days   Patient Care Team:  Chantel Long MD as PCP - General (Family Medicine)    Chief Complaint: Paroxysmal atrial fibrillation with history of documented nonsustained wide QRS complex arrhythmia With a normal stress test and history of atrial tachycardia admitted with generalized weakness and positive C. difficile     Review of Systems:   ROS  Constitutional: Positive for activity change and fatigue. Negative for appetite change, chills, fever and unexpected weight change.   HENT: Negative for congestion, facial swelling, hearing loss, nosebleeds, rhinorrhea, sneezing, trouble swallowing and voice change.    Eyes: Negative for photophobia and visual disturbance.   Respiratory:  Negative for apnea, cough, choking, chest tightness, wheezing and stridor.    Cardiovascular: Negative for chest pain, palpitations and leg swelling  Objective     Current Facility-Administered Medications   Medication Dose Route Frequency Provider Last Rate Last Dose   • amiodarone (PACERONE) tablet 200 mg  200 mg Oral Q24H Eagle Haywood MD   200 mg at 03/09/20 1738   • amLODIPine (NORVASC) tablet 10 mg  10 mg Oral Daily Vinayak Posadas MD   10 mg at 03/09/20 0906   • apixaban (ELIQUIS) tablet 5 mg  5 mg Oral Q12H Robert Cosme MD PhD   5 mg at 03/10/20 0605   • aspirin EC tablet 81 mg  81 mg Oral Daily Vinayak Posadas MD   81 mg at 03/09/20 0906   • atorvastatin (LIPITOR) tablet 10 mg  10 mg Oral Daily Vinayak Posadas MD   10 mg at 03/09/20 0906   • calcium carbonate-vitamin d 600-400 MG-UNIT per tablet 2 tablet  2 tablet Oral Daily Vinayak Posadas MD   2 tablet at 03/09/20 0905   • dextrose (D50W) 25 g/ 50mL Intravenous Solution 25 g  25 g Intravenous Q15 Min PRN Vinayak Posadas MD       • dextrose (GLUTOSE) oral gel 15 g  15 g Oral Q15 Min PRN Vinayak Posadas MD       • DULoxetine (CYMBALTA) DR capsule 60 mg  60 mg Oral Daily Vinayak Posadas MD   60 mg at 03/09/20 0906   • gabapentin  (NEURONTIN) capsule 100 mg  100 mg Oral Q8H Vinayak Posadas MD   100 mg at 03/10/20 0605   • glucagon (human recombinant) (GLUCAGEN DIAGNOSTIC) injection 1 mg  1 mg Subcutaneous Q15 Min PRN Vinayak Posadas MD       • HYDROcodone-acetaminophen (NORCO) 7.5-325 MG per tablet 1 tablet  1 tablet Oral BID - RT Vinayak Posadas MD   1 tablet at 03/09/20 2105   • insulin aspart (novoLOG) injection 0-9 Units  0-9 Units Subcutaneous 4x Daily AC & at Bedtime Vinayak Posadas MD   2 Units at 03/09/20 2105   • insulin aspart (novoLOG) injection 10 Units  10 Units Subcutaneous TID With Meals Vinayak Posadas MD   10 Units at 03/09/20 1739   • insulin detemir (LEVEMIR) injection 10 Units  10 Units Subcutaneous Nightly Vinayak Posadas MD   10 Units at 03/09/20 2105   • levothyroxine (SYNTHROID, LEVOTHROID) tablet 100 mcg  100 mcg Oral Daily Vinayak Posadas MD   100 mcg at 03/09/20 0906   • LORazepam (ATIVAN) tablet 1 mg  1 mg Oral Nightly PRN Vinayak Posadas MD       • losartan (COZAAR) tablet 50 mg  50 mg Oral Q24H Vinayak Posadas MD   50 mg at 03/09/20 0906   • montelukast (SINGULAIR) tablet 10 mg  10 mg Oral Nightly Vinayak Posadas MD   10 mg at 03/09/20 2105   • Pharmacy Consult   Does not apply Continuous PRN Vinayak Posadas MD       • sodium chloride 0.9 % flush 10 mL  10 mL Intravenous Q12H Vinayak Posadas MD   10 mL at 03/09/20 2105   • sodium chloride 0.9 % flush 10 mL  10 mL Intravenous PRN Vinayak Posadas MD       • vancomycin oral solution reconstituted 125 mg  125 mg Oral Q6H Vinayak Posadas MD   125 mg at 03/10/20 0606       Vital Sign Min/Max for last 24 hours  Temp  Min: 97 °F (36.1 °C)  Max: 98.1 °F (36.7 °C)   BP  Min: 120/75  Max: 153/68   Pulse  Min: 71  Max: 86   Resp  Min: 18  Max: 18   SpO2  Min: 92 %  Max: 96 %   No data recorded   Weight  Min: 80.6 kg (177 lb 9.6 oz)  Max: 80.6 kg (177 lb 9.6 oz)     Flowsheet Rows      First Filed Value   Admission Height  170.2 cm  "(67\") Documented at 03/07/2020 1336   Admission Weight  86.2 kg (190 lb) Documented at 03/07/2020 1056            Intake/Output Summary (Last 24 hours) at 3/10/2020 0834  Last data filed at 3/9/2020 1428  Gross per 24 hour   Intake 300 ml   Output --   Net 300 ml       Physical Exam:     General Appearance:   Alert, cooperative, in no acute distress   Lungs:    Clear to auscultation, respirations regular, even and               unlabored    Heart:   Irregular rhythm and normal rate, normal S1 and S2, no         murmur, no gallop, no rub, no click   Chest Wall:   No abnormalities observed   Abdomen:    Normal bowel sounds, no masses, no organomegaly, soft     nontender, nondistended, no guarding, no rebound                tenderness   Extremities:  Moves all extremities well, no edema, no cyanosis, no           redness   Pulses:  Pulses palpable and equal bilaterally   Skin:  No bleeding, bruising or rash        Results Review:   I reviewed the patient's new clinical results.  Lab Results   Component Value Date    WBC 7.85 03/10/2020    HGB 10.7 (L) 03/10/2020    HCT 32.9 (L) 03/10/2020    MCV 88.9 03/10/2020     03/10/2020     Lab Results   Component Value Date    GLUCOSE 213 (H) 03/10/2020    BUN 13 03/10/2020    CREATININE 0.69 03/10/2020    EGFRIFNONA 81 03/10/2020    BCR 18.8 03/10/2020    CO2 22.0 03/10/2020    CALCIUM 9.0 03/10/2020    ALBUMIN 4.40 02/06/2020    AST 22 02/06/2020    ALT 17 02/06/2020     Lab Results   Component Value Date    TSH 0.976 02/06/2020     No results found for: INR, PROTIME  Lab Results   Component Value Date    PTT 26.9 03/06/2020       EKG:     Telemetry:    Ejection Fraction  Lab Results   Component Value Date    EF 70 09/27/2018       Echo EF Estimated  Lab Results   Component Value Date    ECHOEFEST 60 04/18/2018         Present on Admission:  • Chronic obstructive pulmonary disease (CMS/HCC)  • Atrial fibrillation with RVR (CMS/HCC)  • Diabetes mellitus " (CMS/[facility])    Assessment/Plan     Atrial fibrillation with variable ventricular rate  #2 history of atrial tachycardia and nonsustained wide QRS complex rhythm with a normal stress test in the past  3 hypertension with hypertensive heart disease  #4 C. difficile positive     84 years old patient admitted for evaluation of generalized weakness on the monitor noted to have wide complex rhythm nonsustained with underlying atrial fibrillation the possibility of aberrant conduction cannot be excluded.. Monitor atrial fibrillation with controlled ventricular rate.  We will continue amiodarone 200 mg a day and Eliquis to decrease the cardiac embolization.  Is on oral vancomycin for management of C. difficile, amlodipine and losartan for management of hypertension with hypertensive heart disease, statin for hyperlipidemia.  We will see on PRN basis and 1 month in the office  Eagle Haywood MD  03/10/20  08:34      Part of this note may be an electronic transcription/translation of spoken language to printed text using the Dragon Dictation system.

## 2020-03-11 ENCOUNTER — READMISSION MANAGEMENT (OUTPATIENT)
Dept: CALL CENTER | Facility: HOSPITAL | Age: 85
End: 2020-03-11

## 2020-03-11 NOTE — OUTREACH NOTE
Prep Survey      Responses   [facility] facility patient discharged from?  [place]   Is LACE score < 7 ?  No   Eligibility  Readm Mgmt   Discharge diagnosis  AFIB/RVR,  DM,  UTI,  COPD,  D-Diff,  clebsiella infection   Does the patient have one of the following disease processes/diagnoses(primary or secondary)?  Other   Does the patient have Home health ordered?  Yes   What is the Home health agency?   [facility]   Is there a DME ordered?  No   Comments regarding appointments  multiple apmts see AVS   Medication alerts for this patient  pacerone, eliquis   Prep survey completed?  Yes          Lexy Lopez RN

## 2020-03-16 ENCOUNTER — LAB (OUTPATIENT)
Dept: LAB | Facility: OTHER | Age: 85
End: 2020-03-16

## 2020-03-16 DIAGNOSIS — F41.9 ANXIETY: ICD-10-CM

## 2020-03-16 DIAGNOSIS — R30.0 DYSURIA: Primary | ICD-10-CM

## 2020-03-16 DIAGNOSIS — R30.0 DYSURIA: ICD-10-CM

## 2020-03-16 DIAGNOSIS — L97.521 ULCER OF LEFT SECOND TOE, LIMITED TO BREAKDOWN OF SKIN (HCC): ICD-10-CM

## 2020-03-16 LAB
BACTERIA UR QL AUTO: ABNORMAL /HPF
BILIRUB UR QL STRIP: NEGATIVE
CLARITY UR: ABNORMAL
COLOR UR: YELLOW
GLUCOSE UR STRIP-MCNC: NEGATIVE MG/DL
HGB UR QL STRIP.AUTO: ABNORMAL
HYALINE CASTS UR QL AUTO: ABNORMAL /LPF
KETONES UR QL STRIP: NEGATIVE
LEUKOCYTE ESTERASE UR QL STRIP.AUTO: ABNORMAL
NITRITE UR QL STRIP: NEGATIVE
PH UR STRIP.AUTO: 5.5 [PH] (ref 5.5–8)
PROT UR QL STRIP: ABNORMAL
RBC # UR: ABNORMAL /HPF
REF LAB TEST METHOD: ABNORMAL
SP GR UR STRIP: 1.02 (ref 1–1.03)
SQUAMOUS #/AREA URNS HPF: ABNORMAL /HPF
UROBILINOGEN UR QL STRIP: ABNORMAL
WBC UR QL AUTO: ABNORMAL /HPF

## 2020-03-16 PROCEDURE — 87086 URINE CULTURE/COLONY COUNT: CPT | Performed by: FAMILY MEDICINE

## 2020-03-16 PROCEDURE — 81001 URINALYSIS AUTO W/SCOPE: CPT | Performed by: FAMILY MEDICINE

## 2020-03-16 RX ORDER — GABAPENTIN 300 MG/1
CAPSULE ORAL
Qty: 90 CAPSULE | Refills: 0 | Status: SHIPPED | OUTPATIENT
Start: 2020-03-16 | End: 2020-04-14 | Stop reason: SDUPTHER

## 2020-03-16 RX ORDER — LORAZEPAM 1 MG/1
1 TABLET ORAL NIGHTLY PRN
Qty: 30 TABLET | Refills: 0 | Status: SHIPPED | OUTPATIENT
Start: 2020-03-16 | End: 2020-04-14 | Stop reason: SDUPTHER

## 2020-03-16 RX ORDER — SULFAMETHOXAZOLE AND TRIMETHOPRIM 800; 160 MG/1; MG/1
1 TABLET ORAL 2 TIMES DAILY
Qty: 20 TABLET | Refills: 0 | Status: SHIPPED | OUTPATIENT
Start: 2020-03-16 | End: 2020-05-20

## 2020-03-16 RX ORDER — AMLODIPINE BESYLATE 10 MG/1
10 TABLET ORAL DAILY
Qty: 30 TABLET | Refills: 5 | Status: SHIPPED | OUTPATIENT
Start: 2020-03-16 | End: 2020-06-19

## 2020-03-16 NOTE — PROGRESS NOTES
Please call the patient regarding her abnormal result.,  Send in Bactrim DS twice daily for 10 days and let her know

## 2020-03-17 ENCOUNTER — READMISSION MANAGEMENT (OUTPATIENT)
Dept: CALL CENTER | Facility: HOSPITAL | Age: 85
End: 2020-03-17

## 2020-03-17 LAB — BACTERIA SPEC AEROBE CULT: ABNORMAL

## 2020-03-17 RX ORDER — CIPROFLOXACIN 250 MG/1
250 TABLET, FILM COATED ORAL 2 TIMES DAILY
Qty: 14 TABLET | Refills: 0 | Status: SHIPPED | OUTPATIENT
Start: 2020-03-17 | End: 2020-08-20

## 2020-03-17 NOTE — OUTREACH NOTE
Medical Week 1 Survey      Responses   [facility] patient discharged from?  [place]   Does the patient have one of the following disease processes/diagnoses(primary or secondary)?  Other   Is there a successful TCM telephone encounter documented?  No   Week 1 attempt successful?  No   Unsuccessful attempts  Attempt 1          Radha Ott RN

## 2020-03-19 ENCOUNTER — READMISSION MANAGEMENT (OUTPATIENT)
Dept: CALL CENTER | Facility: HOSPITAL | Age: 85
End: 2020-03-19

## 2020-03-19 NOTE — OUTREACH NOTE
Medical Week 2 Survey      Responses   [facility] patient discharged from?  [place]   Does the patient have one of the following disease processes/diagnoses(primary or secondary)?  Other   Call start time  1452   Discharge diagnosis  AFIB/RVR,  DM,  UTI,  COPD,  D-Diff,  clebsiella infection   Call end time  1456   Medication alerts for this patient  pacerone, eliquis   Meds reviewed with patient/caregiver?  Yes   Is the patient having any side effects they believe may be caused by any medication additions or changes?  No   Does the patient have all medications ordered at discharge?  Yes   Is the patient taking all medications as directed (includes completed medication regime)?  Yes   Does the patient have a primary care provider?   Yes   Comments  Patient states at this time she is not going to appts but is aware she can call her doctors if she should have any problems   What is the Home health agency?   [facility]   Has home health visited the patient within 72 hours of discharge?  No   Home health comments  Patient states she has not heard from them    Did the patient receive a copy of their discharge instructions?  Yes   Nursing interventions  Reviewed instructions with patient   What is the patient's perception of their health status since discharge?  Improving   Is the patient/caregiver able to teach back signs and symptoms related to disease process for when to call PCP?  Yes   Is the patient/caregiver able to teach back signs and symptoms related to disease process for when to call 911?  Yes   Is the patient/caregiver able to teach back the hierarchy of who to call/visit for symptoms/problems? PCP, Specialist, Home health nurse, Urgent Care, ED, 911  Yes   Additional teach back comments  States she is doing well and her neighbor checks on her daily.  Has not heard from home health.  Home health called and she had a one time transition visit.  Pt notifed and she stated yes they did come out.           Marisa Chávez, MELVAN

## 2020-04-10 RX ORDER — PEN NEEDLE, DIABETIC 31 GX5/16"
NEEDLE, DISPOSABLE MISCELLANEOUS
Qty: 180 EACH | Refills: 5 | Status: SHIPPED | OUTPATIENT
Start: 2020-04-10 | End: 2020-07-10 | Stop reason: SDUPTHER

## 2020-04-14 DIAGNOSIS — L97.521 ULCER OF LEFT SECOND TOE, LIMITED TO BREAKDOWN OF SKIN (HCC): ICD-10-CM

## 2020-04-14 DIAGNOSIS — F41.9 ANXIETY: ICD-10-CM

## 2020-04-14 RX ORDER — GABAPENTIN 300 MG/1
CAPSULE ORAL
Qty: 90 CAPSULE | Refills: 0 | Status: SHIPPED | OUTPATIENT
Start: 2020-04-14 | End: 2020-05-20 | Stop reason: SDUPTHER

## 2020-04-14 RX ORDER — LORAZEPAM 1 MG/1
1 TABLET ORAL NIGHTLY PRN
Qty: 30 TABLET | Refills: 0 | Status: SHIPPED | OUTPATIENT
Start: 2020-04-14 | End: 2020-05-20 | Stop reason: SDUPTHER

## 2020-04-16 RX ORDER — MONTELUKAST SODIUM 10 MG/1
10 TABLET ORAL NIGHTLY
Qty: 30 TABLET | Refills: 5 | Status: SHIPPED | OUTPATIENT
Start: 2020-04-16 | End: 2020-08-12

## 2020-04-21 ENCOUNTER — OFFICE VISIT (OUTPATIENT)
Dept: CARDIOLOGY | Facility: CLINIC | Age: 85
End: 2020-04-21

## 2020-04-21 VITALS
BODY MASS INDEX: 27.78 KG/M2 | HEIGHT: 67 IN | WEIGHT: 177 LBS | DIASTOLIC BLOOD PRESSURE: 62 MMHG | TEMPERATURE: 96.5 F | SYSTOLIC BLOOD PRESSURE: 121 MMHG

## 2020-04-21 DIAGNOSIS — E78.2 MIXED HYPERLIPIDEMIA: Primary | ICD-10-CM

## 2020-04-21 DIAGNOSIS — R01.1 CARDIAC MURMUR: ICD-10-CM

## 2020-04-21 DIAGNOSIS — I47.1 PAROXYSMAL SVT (SUPRAVENTRICULAR TACHYCARDIA) (HCC): ICD-10-CM

## 2020-04-21 DIAGNOSIS — I10 ESSENTIAL HYPERTENSION: ICD-10-CM

## 2020-04-21 DIAGNOSIS — I48.0 PAROXYSMAL ATRIAL FIBRILLATION (HCC): ICD-10-CM

## 2020-04-21 PROBLEM — I48.91 ATRIAL FIBRILLATION WITH RVR (HCC): Status: RESOLVED | Noted: 2020-03-07 | Resolved: 2020-04-21

## 2020-04-21 PROCEDURE — 99442 PR PHYS/QHP TELEPHONE EVALUATION 11-20 MIN: CPT | Performed by: INTERNAL MEDICINE

## 2020-04-21 NOTE — PROGRESS NOTES
Nyla Piña  84 y.o. female    08/20/2019  1. Mixed hyperlipidemia    2. Essential hypertension    3. Paroxysmal SVT (supraventricular tachycardia) (CMS/HCC)    4. Cardiac murmur    5. Paroxysmal atrial fibrillation (CMS/HCC)        History of Present Illness:    This visit has been rescheduled as a phone visit to comply with patient safety concerns in accordance with CDC recommendations. Total time of discussion was 15 minutes.    You have chosen to receive care through a telephone visit. Do you consent to use a telephone visit for your medical care today? Yes    Body mass index is 27.72 kg/m². BMI is above normal parameters. Recommendations include: exercise counseling, nutrition counseling and referral to primary care.        82 years old patient presented for telephone offce visit evaluated at [facility] when she admitted noted atrial fibrillation with rapid ventricular rate subsequently converted to sinus rhythm started amiodarone and oral anticoagulation physically active with long-standing history of diabetes on insulin regimen, hypothyroidism on hormone replacement, hyperlipidemia, chronic back pain, peripheral neuropathy .  Denies syncope or near syncopal episode.  Had history of back pain but able to take care of herself very well.  No Syncope or near syncopal episode reported.  Patient underwent Holter monitor echocardiographic study.  Holter monitor reported to have nonsustained wide complex tachycardia 8 week at rate of approximately 1 90 bpm and nonsustained atrial tachycardia longest was 14 beat at rate approximately 1 40 bpm.    TSH level within normal range and lipid profile also within the recommended range.  No fever cough which was reported.  No dysuria or hematuria or bright red blood per rectum reported.  No intermittent claudication reported.     1/2018  Total Cholesterol 150 - 200 mg/dL 183    Triglycerides 35 - 160 mg/dL 92    HDL Cholesterol 35 - 100 mg/dL 75    LDL  Cholesterol  mg/dL 90    VLDL Cholesterol mg/dL 18.4    LDL/HDL Ratio   1.19       TSH 0.460 - 4.680 mIU/mL 1.130             STRESS TEST 9/2018    · Findings consistent with an equivocal ECG stress test.  · Myocardial perfusion imaging indicates a normal myocardial perfusion study with no evidence of ischemia.  · Impressions are consistent with a low risk study.  · Left ventricular ejection fraction is normal (Calculated EF = 70%).        Abnormal ZIO PATCH 4/2018  Predominant rhythm is sinus with evidence of prolonged MO interval and nonsustained wide QRS comlex tachycardia 8 at rate of 190 to 210 bpm ..  There is nonsustained atrial tachycardia longest 14 beat at a rate approximate 140 bpm.  There are frequent premature supraventricular ventricle ectopic beats representing 3.5% of total atrial activity and a rare premature supraventricular couplet.  There are isolated premature ventricular complex proximate 3500 and with some bigeminy pattern.  No evidence significant bradyarrhythmia or pauses noted        4/2018     Left Ventricle Left ventricular systolic function is normal. Estimated EF was in agreement with the calculated EF. Estimated EF appears to be in the range of 56 - 60%. Estimated EF = 60%. Normal left ventricular cavity size noted. All left ventricular wall segments contract normally. Left ventricular wall thickness is consistent with mild concentric hypertrophy. Left ventricular diastolic dysfunction is noted (grade I) consistent with impaired relaxation.      Right Ventricle Normal right ventricular cavity size and systolic function noted.      Left Atrium Normal left atrial size and volume noted.      Right Atrium Normal right atrial size noted.      Aortic Valve The aortic valve is abnormal in structure. There is calcification of the aortic valve.No significant aortic valve regurgitation is present. Mild aortic valve stenosis is present.      Mitral Valve The mitral valve is grossly normal in  structure. Trace mitral valve regurgitation is present.      Tricuspid Valve The tricuspid valve is grossly normal. Trace tricuspid valve regurgitation is present.      Pulmonic Valve The pulmonic valve is grossly normal in structure. There is no significant pulmonic valve regurgitation present.      Greater Vessels No dilation of the aortic root is present. No dilation of the sinuses of Valsalva is present.      Pericardium There is no evidence of pericardial effusion                   SUBJECTIVE:    Allergies   Allergen Reactions   • Latex Itching   • Penicillins Swelling and Rash         Past Medical History:   Diagnosis Date   • Acute bronchitis    • Acute sinusitis    • Acute upper respiratory infection    • Anxiety    • Atrial fibrillation (CMS/HCC)    • Cellulitis of lower leg    • Chronic urinary tract infection    • COPD (chronic obstructive pulmonary disease) (CMS/[facility])    • Cough    • Death of     • Diabetic asymmetric polyneuropathy (CMS/HCC)    • Diabetic peripheral neuropathy (CMS/HCC)    • Diabetic polyneuropathy (CMS/HCC)    • Disease of nail    • Dorsalgia    • Flank pain    • Foot ulcer (CMS/[facility])    • Hashimoto's thyroiditis    • History of echocardiogram 02/19/2013   • Hypertensive disorder    • Insomnia    • Mixed hyperlipidemia    • Non-healing surgical wound    • Osteoarthritis of multiple joints    • Peripheral vascular disease (CMS/HCC)    • Seasonal allergic rhinitis    • Type II diabetes mellitus, uncontrolled (CMS/[facility])    • Upper respiratory infection    • Urinary tract infectious disease    • Urinary tract infectious disease    • Vitamin D deficiency          Past Surgical History:   Procedure Laterality Date   • EXCISION MASS LEG Left 11/05/2013    Excision of soft tissue mass of left leg   • HYSTERECTOMY      Age 27   • STEROID INJECTION  02/08/2016    Depo Medrol (Methylprednisone) 80mg (Acute upper respiratory infection, unspecified)          No family history on  file.      Social History     Socioeconomic History   • Marital status:      Spouse name: Not on file   • Number of children: Not on file   • Years of education: Not on file   • Highest education level: Not on file   Tobacco Use   • Smoking status: Never Smoker   • Smokeless tobacco: Never Used   Substance and Sexual Activity   • Alcohol use: No   • Drug use: No   • Sexual activity: Defer         Current Outpatient Medications   Medication Sig Dispense Refill   • amiodarone (PACERONE) 200 MG tablet Take 1 tablet by mouth Daily. 30 tablet 1   • amLODIPine (NORVASC) 10 MG tablet TAKE 1 TABLET BY MOUTH DAILY. 30 tablet 5   • apixaban (ELIQUIS) 5 MG tablet tablet Take 1 tablet by mouth Every 12 (Twelve) Hours. Indications: Atrial Fibrillation 60 tablet 1   • aspirin 81 MG tablet Take 81 mg by mouth daily.     • B-D ULTRAFINE III SHORT PEN 31G X 8 MM misc USE AND DISCARD 1 PEN      NEEDLE 4 TIMES DAILY 180 each 5   • Calcium Carbonate (CALCIUM 600 PO) Take 2 tablets by mouth daily.     • ciprofloxacin (CIPRO) 250 MG tablet Take 1 tablet by mouth 2 (Two) Times a Day. Stop taking Bactrim 14 tablet 0   • diclofenac (VOLTAREN) 1 % gel gel Apply 4 g topically 4 (Four) Times a Day. 100 g 11   • DULoxetine (CYMBALTA) 60 MG capsule TAKE 1 CAPSULE BY MOUTH DAILY. 90 capsule 1   • gabapentin (NEURONTIN) 300 MG capsule TAKE ONE CAPSULE BY MOUTH THREE TIMES A DAY 90 capsule 0   • HYDROcodone-acetaminophen (NORCO) 7.5-325 MG per tablet Take 1 tablet by mouth 2 (Two) Times a Day. 60 tablet 0   • insulin aspart (novoLOG FLEXPEN) 100 UNIT/ML solution pen-injector sc pen Take 15 units TID before each meal 15 pen 3   • Insulin Glargine (BASAGLAR KWIKPEN) 100 UNIT/ML injection pen Inject 15 Units under the skin Every Night. 5 pen 5   • irbesartan (AVAPRO) 150 MG tablet TAKE 1 TABLET BY MOUTH EVERY NIGHT. 30 tablet 5   • levothyroxine (SYNTHROID, LEVOTHROID) 100 MCG tablet TAKE 1 TABLET DAILY 90 tablet 1   • LORazepam (ATIVAN) 1 MG  "tablet Take 1 tablet by mouth At Night As Needed for Anxiety or Sleep. 30 tablet 0   • metFORMIN (GLUCOPHAGE) 850 MG tablet TAKE 1 TABLET BY MOUTH 2 (TWO) TIMES A DAY WITH MEALS. 180 tablet 1   • metoprolol succinate XL (TOPROL-XL) 25 MG 24 hr tablet Take 1 tablet by mouth Daily. (Patient taking differently: Take 50 mg by mouth Daily.) 90 tablet 3   • montelukast (SINGULAIR) 10 MG tablet TAKE 1 TABLET BY MOUTH EVERY NIGHT  DAYS. 30 tablet 5   • Needle, Disp, 31G X 5/16\" misc 1 each 4 (Four) Times a Day. Pen Needle; 400 each 3   • simvastatin (ZOCOR) 10 MG tablet Take 20 mg by mouth Daily.     • sulfamethoxazole-trimethoprim (BACTRIM DS) 800-160 MG per tablet Take 1 tablet by mouth 2 (Two) Times a Day. 20 tablet 0   • TRUE METRIX BLOOD GLUCOSE TEST test strip USE TO TEST FOUR TIMES DAILY 150 each 11   • TRUEPLUS LANCETS 28G misc TEST FOUR TIMES A  each 11   • vitamin D (ERGOCALCIFEROL) 1.25 MG (79467 UT) capsule capsule TAKE ONE CAPSULE BY MOUTH ONCE WEEKLY 12 capsule 11     Current Facility-Administered Medications   Medication Dose Route Frequency Provider Last Rate Last Dose   • cyanocobalamin injection 1,000 mcg  1,000 mcg Intramuscular Q28 Days Chantel Long MD   1,000 mcg at 03/07/19 1145           Review of Systems:     Constitutional:  Denies recent weight loss, weight gain, fever or chills, no change in exercise tolerance.     HENT:  Denies any hearing loss, epistaxis, hoarseness, or difficulty speaking.     Eyes: No blurring.    Respiratory:  Denies dyspnea with exertion,no cough.     Cardiovascular: See H&P    Gastrointestinal:  Denies change in bowel habits, dyspepsia, ulcer disease, hematochezia, or melena.     Endocrine: History of diabetes    .      Musculoskeletal: Chronic back pain    Skin:  Denies any change in hair or nails, rashes, or skin lesions.     Allergic/Immunologic: Negative.  Negative for environmental allergies, food allergies and immunocompromised state. " "    Neurological:  Denies any history of recurrent headaches, strokes, TIA, or seizure disorder.           OBJECTIVE:    /62   Temp 96.5 °F (35.8 °C)   Ht 170.2 cm (67\")   Wt 80.3 kg (177 lb)   BMI 27.72 kg/m²       Physical Exam:     presented for telephone offce visit, No physical exam        Procedures      Lab Results   Component Value Date    WBC 7.85 03/10/2020    HGB 10.7 (L) 03/10/2020    HCT 32.9 (L) 03/10/2020    MCV 88.9 03/10/2020     03/10/2020     Lab Results   Component Value Date    GLUCOSE 213 (H) 03/10/2020    BUN 13 03/10/2020    CREATININE 0.69 03/10/2020    EGFRIFNONA 81 03/10/2020    BCR 18.8 03/10/2020    CO2 22.0 03/10/2020    CALCIUM 9.0 03/10/2020    ALBUMIN 4.40 02/06/2020    AST 22 02/06/2020    ALT 17 02/06/2020     Lab Results   Component Value Date    CHOL 232 (H) 08/06/2019    CHOL 188 02/05/2019    CHOL 183 01/24/2018     Lab Results   Component Value Date    TRIG 158 (H) 08/06/2019    TRIG 151 (H) 02/05/2019    TRIG 92 01/24/2018     Lab Results   Component Value Date    HDL 57 08/06/2019    HDL 55 02/05/2019    HDL 75 01/24/2018     No components found for: LDLCALC  Lab Results   Component Value Date     (H) 08/06/2019     (H) 02/05/2019    LDL 90 01/24/2018     No results found for: HDLLDLRATIO  No components found for: CHOLHDL  Lab Results   Component Value Date    HGBA1C 7.80 (H) 02/06/2020     Lab Results   Component Value Date    TSH 0.976 02/06/2020           ASSESSMENT AND PLAN:  #1 nonsustained wide QRS complex tachycardia getting another stress more   #2 long-standing history diabetes on insulin followed with family doctor   #3 hypertension Continue amlodipine and Avapro  #4 hyperlipidemia   #5 mild dyspnea exertion with associated fatigability   #6  Paroxysmal atrial fibrillation continue amiodarone and anticoagulation     82 years old physically active presented for telephone offce visit evaluated at [facility] for symptomatic " atrial fibrillation converted to sinus rhythm started on amiodarone with a background history of hypertension, hyperlipidemia, long-standing history of diabetes, history of secondhand smoking from underwent cardiac evaluation with Holter monitor, echocardiographic study and stress test.   .  The patient denied any further palpitation.    Patient was started on beta-blocker and no further recurrence of palpitations and she will continue insulin for management of diabetes Synthroid for hypothyroidism and Avapro for management of hypertension with hypertensive heart disease.  Significant of low carbohydrate, low-fat, DASH diet discussed with the patient.  Risk factor lifestyle modification discussed.  Heart shortness of breath multifactorial in etiology given her body habitus and comorbid condition and history of back pain and diabetic neuropathy.        Nyla was seen today for follow-up.    Diagnoses and all orders for this visit:    Mixed hyperlipidemia    Essential hypertension    Paroxysmal SVT (supraventricular tachycardia) (CMS/HCC)    Cardiac murmur    Paroxysmal atrial fibrillation (CMS/HCC)        Eagle Haywood MD  4/21/2020  11:44

## 2020-04-29 ENCOUNTER — TELEPHONE (OUTPATIENT)
Dept: ENDOCRINOLOGY | Facility: CLINIC | Age: 85
End: 2020-04-29

## 2020-04-29 ENCOUNTER — OFFICE VISIT (OUTPATIENT)
Dept: ENDOCRINOLOGY | Facility: CLINIC | Age: 85
End: 2020-04-29

## 2020-04-29 DIAGNOSIS — E11.9 CONTROLLED TYPE 2 DIABETES MELLITUS WITHOUT COMPLICATION, WITH LONG-TERM CURRENT USE OF INSULIN (HCC): Primary | ICD-10-CM

## 2020-04-29 DIAGNOSIS — E06.3 HASHIMOTO'S THYROIDITIS: ICD-10-CM

## 2020-04-29 DIAGNOSIS — Z79.4 CONTROLLED TYPE 2 DIABETES MELLITUS WITHOUT COMPLICATION, WITH LONG-TERM CURRENT USE OF INSULIN (HCC): Primary | ICD-10-CM

## 2020-04-29 PROCEDURE — 99442 PR PHYS/QHP TELEPHONE EVALUATION 11-20 MIN: CPT | Performed by: NURSE PRACTITIONER

## 2020-04-29 NOTE — TELEPHONE ENCOUNTER
Pt called [facility] and said they faxed the paperwork to get her supplies on 4/17 and had her appt today so she could get supplies shipped  please. They need latest notes etc. And fax back

## 2020-04-29 NOTE — PROGRESS NOTES
Subjective    Nyla Piña is a 84 y.o. female. she is here for follow up     History of Present Illness       You have chosen to receive care through a telephone visit. Do you consent to use a telephone visit for your medical care today? Yes      This is a telephone visit due to pandemic and patient is following the CDC guidelines for social distancing           History of Present Illness        Reason - diabetes         Duration/Timing:Diabetes mellitus type 2, Age at onset of diabetes : 58 years, Onset of symptoms gradual         Timing constant      quality controlled      Severity Complications        She has been in the hospital for A.fib             Severity (Complications/Hospitalizations)  Secondary Macrovascular Complications: No CAD, No CVA, No PAD    Secondary Microvascular Complications: No Diabetic Nephropathy, Microalbuminuria, No proteinuria, No Diabetic Retinopathy, Diabetic Neuropathy     Context -- routine lab work     Diabetes Regimen: Insulin, oral medication,                        Lab Results   Component Value Date     HGBA1C 6.90 (H) 08/06/2019                     Blood Glucose Readings      Checks 4 times daily up to 10 times daily      Using fili personal system        Her sugars in the am was 80 up to 123       During the daytime 100 up to 179        Diet      Low carb      Exercise:Does not exercise     Associated Signs/Symptoms  Hyperglycemic Symptoms:Polyruria, polydipsia, polyphagia, No blurred vision, No weight gain    Hypoglycemic Episodes:No documented hypoglycemia               The following portions of the patient's history were reviewed and updated as appropriate:   Past Medical History:   Diagnosis Date   • Acute bronchitis    • Acute sinusitis    • Acute upper respiratory infection    • Anxiety    • Atrial fibrillation (CMS/[facility])    • Cellulitis of lower leg    • Chronic urinary tract infection    • COPD (chronic obstructive pulmonary disease) (CMS/[facility])    • Cough     • Death of     • Diabetic asymmetric polyneuropathy (CMS/HCC)    • Diabetic peripheral neuropathy (CMS/HCC)    • Diabetic polyneuropathy (CMS/HCC)    • Disease of nail    • Dorsalgia    • Flank pain    • Foot ulcer (CMS/HCC)    • Hashimoto's thyroiditis    • History of echocardiogram 2013   • Hypertensive disorder    • Insomnia    • Mixed hyperlipidemia    • Non-healing surgical wound    • Osteoarthritis of multiple joints    • Peripheral vascular disease (CMS/HCC)    • Seasonal allergic rhinitis    • Type II diabetes mellitus, uncontrolled (CMS/HCC)    • Upper respiratory infection    • Urinary tract infectious disease    • Urinary tract infectious disease    • Vitamin D deficiency      Past Surgical History:   Procedure Laterality Date   • EXCISION MASS LEG Left 2013    Excision of soft tissue mass of left leg   • HYSTERECTOMY      Age 27   • STEROID INJECTION  2016    Depo Medrol (Methylprednisone) 80mg (Acute upper respiratory infection, unspecified)      History reviewed. No pertinent family history.  OB History        2    Para   2    Term   2            AB        Living           SAB        TAB        Ectopic        Molar        Multiple        Live Births                  Current Outpatient Medications   Medication Sig Dispense Refill   • amiodarone (PACERONE) 200 MG tablet Take 1 tablet by mouth Daily. 30 tablet 1   • amLODIPine (NORVASC) 10 MG tablet TAKE 1 TABLET BY MOUTH DAILY. 30 tablet 5   • apixaban (ELIQUIS) 5 MG tablet tablet Take 1 tablet by mouth Every 12 (Twelve) Hours. Indications: Atrial Fibrillation 60 tablet 1   • aspirin 81 MG tablet Take 81 mg by mouth daily.     • B-D ULTRAFINE III SHORT PEN 31G X 8 MM misc USE AND DISCARD 1 PEN      NEEDLE 4 TIMES DAILY 180 each 5   • Calcium Carbonate (CALCIUM 600 PO) Take 2 tablets by mouth daily.     • ciprofloxacin (CIPRO) 250 MG tablet Take 1 tablet by mouth 2 (Two) Times a Day. Stop taking Bactrim 14 tablet 0  "  • diclofenac (VOLTAREN) 1 % gel gel Apply 4 g topically 4 (Four) Times a Day. 100 g 11   • DULoxetine (CYMBALTA) 60 MG capsule TAKE 1 CAPSULE BY MOUTH DAILY. 90 capsule 1   • gabapentin (NEURONTIN) 300 MG capsule TAKE ONE CAPSULE BY MOUTH THREE TIMES A DAY 90 capsule 0   • HYDROcodone-acetaminophen (NORCO) 7.5-325 MG per tablet Take 1 tablet by mouth 2 (Two) Times a Day. 60 tablet 0   • insulin aspart (novoLOG FLEXPEN) 100 UNIT/ML solution pen-injector sc pen Take 15 units TID before each meal 15 pen 3   • Insulin Glargine (BASAGLAR KWIKPEN) 100 UNIT/ML injection pen Inject 15 Units under the skin Every Night. 5 pen 5   • irbesartan (AVAPRO) 150 MG tablet TAKE 1 TABLET BY MOUTH EVERY NIGHT. 30 tablet 5   • levothyroxine (SYNTHROID, LEVOTHROID) 100 MCG tablet TAKE 1 TABLET DAILY 90 tablet 1   • LORazepam (ATIVAN) 1 MG tablet Take 1 tablet by mouth At Night As Needed for Anxiety or Sleep. 30 tablet 0   • metFORMIN (GLUCOPHAGE) 850 MG tablet TAKE 1 TABLET BY MOUTH 2 (TWO) TIMES A DAY WITH MEALS. 180 tablet 1   • metoprolol succinate XL (TOPROL-XL) 25 MG 24 hr tablet Take 1 tablet by mouth Daily. (Patient taking differently: Take 50 mg by mouth Daily.) 90 tablet 3   • montelukast (SINGULAIR) 10 MG tablet TAKE 1 TABLET BY MOUTH EVERY NIGHT  DAYS. 30 tablet 5   • Needle, Disp, 31G X 5/16\" misc 1 each 4 (Four) Times a Day. Pen Needle; 400 each 3   • simvastatin (ZOCOR) 10 MG tablet Take 20 mg by mouth Daily.     • sulfamethoxazole-trimethoprim (BACTRIM DS) 800-160 MG per tablet Take 1 tablet by mouth 2 (Two) Times a Day. 20 tablet 0   • TRUE METRIX BLOOD GLUCOSE TEST test strip USE TO TEST FOUR TIMES DAILY 150 each 11   • TRUEPLUS LANCETS 28G misc TEST FOUR TIMES A  each 11   • vitamin D (ERGOCALCIFEROL) 1.25 MG (80960 UT) capsule capsule TAKE ONE CAPSULE BY MOUTH ONCE WEEKLY 12 capsule 11     Current Facility-Administered Medications   Medication Dose Route Frequency Provider Last Rate Last Dose   • " cyanocobalamin injection 1,000 mcg  1,000 mcg Intramuscular Q28 Days Chantel Long MD   1,000 mcg at 03/07/19 1145     Allergies   Allergen Reactions   • Latex Itching   • Penicillins Swelling and Rash     Social History     Socioeconomic History   • Marital status:      Spouse name: Not on file   • Number of children: Not on file   • Years of education: Not on file   • Highest education level: Not on file   Tobacco Use   • Smoking status: Never Smoker   • Smokeless tobacco: Never Used   Substance and Sexual Activity   • Alcohol use: No   • Drug use: No   • Sexual activity: Defer       Review of Systems  Review of Systems   Constitutional: Negative for activity change, appetite change, diaphoresis and fatigue.   HENT: Negative for facial swelling, sneezing, sore throat, tinnitus, trouble swallowing and voice change.    Eyes: Negative for photophobia, pain, discharge, redness, itching and visual disturbance.   Respiratory: Negative for apnea, cough, choking, chest tightness and shortness of breath.    Cardiovascular: Negative for chest pain, palpitations and leg swelling.   Gastrointestinal: Negative for abdominal distention, abdominal pain, constipation, diarrhea, nausea and vomiting.   Endocrine: Negative for cold intolerance, heat intolerance, polydipsia, polyphagia and polyuria.   Genitourinary: Negative for difficulty urinating, dysuria, frequency, hematuria and urgency.   Musculoskeletal: Negative for arthralgias, back pain, gait problem, joint swelling, myalgias, neck pain and neck stiffness.   Skin: Negative for color change, pallor, rash and wound.   Neurological: Negative for dizziness, tremors, weakness, light-headedness, numbness and headaches.   Hematological: Negative for adenopathy. Does not bruise/bleed easily.   Psychiatric/Behavioral: Negative for behavioral problems, confusion and sleep disturbance.        Objective    There were no vitals taken for this visit.  Physical Exam    Constitutional: She is oriented to person, place, and time.   Neurological: She is alert and oriented to person, place, and time.   Psychiatric: She has a normal mood and affect. Her behavior is normal. Judgment and thought content normal.       BP - 112/73  HR - 93  Temp - 96.5        Lab Review  Glucose (mg/dL)   Date Value   03/10/2020 213 (H)   03/09/2020 170 (H)   03/08/2020 225 (H)     Sodium (mmol/L)   Date Value   03/10/2020 134 (L)   03/09/2020 136   03/08/2020 134 (L)     Potassium (mmol/L)   Date Value   03/10/2020 4.4   03/09/2020 4.8   03/08/2020 4.1     Chloride (mmol/L)   Date Value   03/10/2020 99   03/09/2020 99   03/08/2020 98     CO2 (mmol/L)   Date Value   03/10/2020 22.0   03/09/2020 23.0   03/08/2020 22.0     BUN (mg/dL)   Date Value   03/10/2020 13   03/09/2020 14   03/08/2020 15     Creatinine (mg/dL)   Date Value   03/10/2020 0.69   03/09/2020 0.73   03/08/2020 0.82     Hemoglobin A1C (%)   Date Value   02/06/2020 7.80 (H)   08/06/2019 6.90 (H)   02/05/2019 7.2 (H)   07/18/2018 6.9 (H)   01/24/2018 7.1 (H)     Triglycerides (mg/dL)   Date Value   08/06/2019 158 (H)   02/05/2019 151 (H)   01/24/2018 92     LDL Cholesterol  (mg/dL)   Date Value   08/06/2019 143 (H)   02/05/2019 103 (H)   01/24/2018 90     LDLCALC ELECT (mg/dl)   Date Value   07/20/2016 103       Assessment/Plan      1. Controlled type 2 diabetes mellitus without complication, with long-term current use of insulin (CMS/[facility])    2. Hashimoto's thyroiditis    .    Medications prescribed:  Outpatient Encounter Medications as of 4/29/2020   Medication Sig Dispense Refill   • amiodarone (PACERONE) 200 MG tablet Take 1 tablet by mouth Daily. 30 tablet 1   • amLODIPine (NORVASC) 10 MG tablet TAKE 1 TABLET BY MOUTH DAILY. 30 tablet 5   • apixaban (ELIQUIS) 5 MG tablet tablet Take 1 tablet by mouth Every 12 (Twelve) Hours. Indications: Atrial Fibrillation 60 tablet 1   • aspirin 81 MG tablet Take 81 mg by mouth daily.     • B-D  "ULTRAFINE III SHORT PEN 31G X 8 MM misc USE AND DISCARD 1 PEN      NEEDLE 4 TIMES DAILY 180 each 5   • Calcium Carbonate (CALCIUM 600 PO) Take 2 tablets by mouth daily.     • ciprofloxacin (CIPRO) 250 MG tablet Take 1 tablet by mouth 2 (Two) Times a Day. Stop taking Bactrim 14 tablet 0   • diclofenac (VOLTAREN) 1 % gel gel Apply 4 g topically 4 (Four) Times a Day. 100 g 11   • DULoxetine (CYMBALTA) 60 MG capsule TAKE 1 CAPSULE BY MOUTH DAILY. 90 capsule 1   • gabapentin (NEURONTIN) 300 MG capsule TAKE ONE CAPSULE BY MOUTH THREE TIMES A DAY 90 capsule 0   • HYDROcodone-acetaminophen (NORCO) 7.5-325 MG per tablet Take 1 tablet by mouth 2 (Two) Times a Day. 60 tablet 0   • insulin aspart (novoLOG FLEXPEN) 100 UNIT/ML solution pen-injector sc pen Take 15 units TID before each meal 15 pen 3   • Insulin Glargine (BASAGLAR KWIKPEN) 100 UNIT/ML injection pen Inject 15 Units under the skin Every Night. 5 pen 5   • irbesartan (AVAPRO) 150 MG tablet TAKE 1 TABLET BY MOUTH EVERY NIGHT. 30 tablet 5   • levothyroxine (SYNTHROID, LEVOTHROID) 100 MCG tablet TAKE 1 TABLET DAILY 90 tablet 1   • LORazepam (ATIVAN) 1 MG tablet Take 1 tablet by mouth At Night As Needed for Anxiety or Sleep. 30 tablet 0   • metFORMIN (GLUCOPHAGE) 850 MG tablet TAKE 1 TABLET BY MOUTH 2 (TWO) TIMES A DAY WITH MEALS. 180 tablet 1   • metoprolol succinate XL (TOPROL-XL) 25 MG 24 hr tablet Take 1 tablet by mouth Daily. (Patient taking differently: Take 50 mg by mouth Daily.) 90 tablet 3   • montelukast (SINGULAIR) 10 MG tablet TAKE 1 TABLET BY MOUTH EVERY NIGHT  DAYS. 30 tablet 5   • Needle, Disp, 31G X 5/16\" misc 1 each 4 (Four) Times a Day. Pen Needle; 400 each 3   • simvastatin (ZOCOR) 10 MG tablet Take 20 mg by mouth Daily.     • sulfamethoxazole-trimethoprim (BACTRIM DS) 800-160 MG per tablet Take 1 tablet by mouth 2 (Two) Times a Day. 20 tablet 0   • TRUE METRIX BLOOD GLUCOSE TEST test strip USE TO TEST FOUR TIMES DAILY 150 each 11   • TRUEPLUS " LANCETS 28G misc TEST FOUR TIMES A  each 11   • vitamin D (ERGOCALCIFEROL) 1.25 MG (84444 UT) capsule capsule TAKE ONE CAPSULE BY MOUTH ONCE WEEKLY 12 capsule 11     Facility-Administered Encounter Medications as of 4/29/2020   Medication Dose Route Frequency Provider Last Rate Last Dose   • cyanocobalamin injection 1,000 mcg  1,000 mcg Intramuscular Q28 Days Chantel Long MD   1,000 mcg at 03/07/19 1145       Orders placed during this encounter include:  No orders of the defined types were placed in this encounter.    Glycemic Management     Diabetes Mellitus type 2                    Lab Results   Component Value Date    HGBA1C 7.80 (H) 02/06/2020             Novolog mix stopped   Januvia stopped it due to headaches  Glimepiride stopped caused higher sugars         Metformin 850 mg one tablet po BID         Basaglar 15 units      Novolog for meals      Breakfast -- 6     Lunch -- 7     Supper -- 8        Patient use fili personal system     She gave me her readings over the phone     Averaging 138               This document has been electronically signed by MARTHA Gonzalez on April 29, 2020 14:30                           Lipid Management      Simvastatin 20 mg one at bedtime              Total Cholesterol   Date Value Ref Range Status   08/06/2019 232 (H) 150 - 200 mg/dL Final     Triglycerides   Date Value Ref Range Status   08/06/2019 158 (H) <=150 mg/dL Final     HDL Cholesterol   Date Value Ref Range Status   08/06/2019 57 40 - 59 mg/dL Final     LDL Cholesterol    Date Value Ref Range Status   08/06/2019 143 (H) <=100 mg/dL Final     LDLCALC ELECT   Date Value Ref Range Status   07/20/2016 103 0 - 129 mg/dl Final     Comment:     LDL DESIRED: < 130 MG/DL           Blood Pressure Management           amlodipine 5 mg daily      Valsartan -- on recall     Amiodarone 200 mg daily            Microvascular Complication Monitoring      Cymbalta 60 mg one daily      Gabapentin 300 mg one  TID        hydrocodone-acetaminophen 7.5- 500 mg 2 times daily      3- 15 Microalbuminuria       Component      Latest Ref Rng & Units 2/6/2020 2/6/2020          10:10 AM 10:10 AM   Protein/Creatinine Ratio      0.0 - 200.0 mg/G Crea 135.1    Creatinine, Urine      mg/dL 170.3 170.3   Total Protein, Urine      mg/dL 23.0    Microalbumin/Creatinine Ratio      mg/g  27.0   Microalbumin, Urine      mg/dL  4.6                Bone Health      h o vitamin d def      vitamin d 50,000 units weekly              Component      Latest Ref Rng & Units 2/6/2020   25 Hydroxy, Vitamin D      30.0 - 100.0 ng/ml 36.8        Immunization: influenza vaccine Oct. 2019 , shingles vaccine Oct. 2016         Other Diabetes Related Aspects         Hypothyroidism           Levothyroxine 100 mcg one daily Monday through Friday and 1/2 tablet on Saturday and none on Sunday        Lab Results   Component Value Date    TSH 0.976 02/06/2020             We spent 15minutes including reviewing the patients electronic chart, reviewing medications, reviewing labs, active problems    I provided advice regarding diabetes management, dietary changes, adjustments of medication, self titration of insulin, refilled medications, ordering labs, future appointments    Patient was advised to contact the office if there are unanswered questions, trouble with blood glucose management, or any concerns     4. Follow-up: Return in about 4 months (around 8/29/2020) for Recheck.

## 2020-05-11 DIAGNOSIS — L97.521 ULCER OF LEFT SECOND TOE, LIMITED TO BREAKDOWN OF SKIN (HCC): ICD-10-CM

## 2020-05-11 DIAGNOSIS — F41.9 ANXIETY: ICD-10-CM

## 2020-05-11 RX ORDER — LORAZEPAM 1 MG/1
1 TABLET ORAL NIGHTLY PRN
Qty: 30 TABLET | Refills: 0 | OUTPATIENT
Start: 2020-05-11

## 2020-05-11 RX ORDER — GABAPENTIN 300 MG/1
CAPSULE ORAL
Qty: 90 CAPSULE | Refills: 0 | OUTPATIENT
Start: 2020-05-11

## 2020-05-20 ENCOUNTER — OFFICE VISIT (OUTPATIENT)
Dept: FAMILY MEDICINE CLINIC | Facility: CLINIC | Age: 85
End: 2020-05-20

## 2020-05-20 DIAGNOSIS — J44.9 CHRONIC OBSTRUCTIVE PULMONARY DISEASE, UNSPECIFIED COPD TYPE (HCC): ICD-10-CM

## 2020-05-20 DIAGNOSIS — Z79.4 TYPE 2 DIABETES MELLITUS WITH OTHER SPECIFIED COMPLICATION, WITH LONG-TERM CURRENT USE OF INSULIN (HCC): ICD-10-CM

## 2020-05-20 DIAGNOSIS — I73.9 PERIPHERAL VASCULAR DISEASE (HCC): ICD-10-CM

## 2020-05-20 DIAGNOSIS — F41.9 ANXIETY: ICD-10-CM

## 2020-05-20 DIAGNOSIS — E11.69 TYPE 2 DIABETES MELLITUS WITH OTHER SPECIFIED COMPLICATION, WITH LONG-TERM CURRENT USE OF INSULIN (HCC): ICD-10-CM

## 2020-05-20 DIAGNOSIS — F33.1 MODERATE EPISODE OF RECURRENT MAJOR DEPRESSIVE DISORDER (HCC): ICD-10-CM

## 2020-05-20 DIAGNOSIS — E11.42 DIABETIC PERIPHERAL NEUROPATHY (HCC): Primary | ICD-10-CM

## 2020-05-20 PROCEDURE — G2025 DIS SITE TELE SVCS RHC/FQHC: HCPCS | Performed by: FAMILY MEDICINE

## 2020-05-20 RX ORDER — LORAZEPAM 1 MG/1
1 TABLET ORAL NIGHTLY PRN
Qty: 30 TABLET | Refills: 2 | Status: SHIPPED | OUTPATIENT
Start: 2020-05-20 | End: 2020-08-20 | Stop reason: SDUPTHER

## 2020-05-20 RX ORDER — AMIODARONE HYDROCHLORIDE 200 MG/1
200 TABLET ORAL
Qty: 30 TABLET | Refills: 5 | Status: SHIPPED | OUTPATIENT
Start: 2020-05-20 | End: 2020-06-12

## 2020-05-20 RX ORDER — ALBUTEROL SULFATE 90 UG/1
2 AEROSOL, METERED RESPIRATORY (INHALATION) EVERY 6 HOURS PRN
Qty: 1 INHALER | Refills: 5 | Status: SHIPPED | OUTPATIENT
Start: 2020-05-20

## 2020-05-20 RX ORDER — GABAPENTIN 300 MG/1
CAPSULE ORAL
Qty: 90 CAPSULE | Refills: 2 | Status: SHIPPED | OUTPATIENT
Start: 2020-05-20 | End: 2020-08-20 | Stop reason: SDUPTHER

## 2020-05-20 NOTE — PROGRESS NOTES
Subjective   Nyla Piña is a 84 y.o. female.   We had a telephone visit today due to the Covid-19 quarantine.     This visit has been rescheduled as a phone visit to comply with patient safety concerns in accordance with CDC recommendations. Total time of discussion was 21 minutes.    You have chosen to receive care through a telephone visit. Do you consent to use a telephone visit for your medical care today? Yes  Patient with hypertension, diabetes, diabetic neuropathy here today for follow-up and refills of medications.  She continues to feel very tired all the time her blood pressure has been doing well.  She also needs a refill of her medicine for anxiety.  Since the death of her  it has been difficult for her especially at night.  She tells me today she is very lonely.  She really has no family nearby, her children and grandchildren live out of state. She takes gabapentin for diabetic peripheral neuropathy.  She test blood sugars daily and they have been doing well, within the goal parameters.  She has an ulceration over the dorsal surface of the left great toe.  She was set to have foot surgery but it was postponed due to Covid-19.     History of Present Illness    The following portions of the patient's history were reviewed and updated as appropriate: allergies, current medications, past family history, past medical history, past social history, past surgical history and problem list.    Review of Systems   HENT: Negative.    Cardiovascular: Negative.    Genitourinary: Negative for flank pain, frequency, hematuria and urgency.   Musculoskeletal: Positive for gait problem.   Skin: Negative.    Allergic/Immunologic: Negative for immunocompromised state.   Neurological: Negative for syncope.   Hematological: Negative.    Psychiatric/Behavioral: Positive for sleep disturbance. Negative for agitation and dysphoric mood.   All other systems reviewed and are negative.      Objective   Physical  Exam    Assessment/Plan   Nyla was seen today for atrial fibrillation.    Diagnoses and all orders for this visit:    Diabetic peripheral neuropathy (CMS/HCC)  -     gabapentin (NEURONTIN) 300 MG capsule; TAKE ONE CAPSULE BY MOUTH THREE TIMES A DAY    Anxiety  -     LORazepam (ATIVAN) 1 MG tablet; Take 1 tablet by mouth At Night As Needed for Anxiety or Sleep.    Type 2 diabetes mellitus with other specified complication, with long-term current use of insulin (CMS/HCC)    Peripheral vascular disease (CMS/HCC)    Chronic obstructive pulmonary disease, unspecified COPD type (CMS/HCC)  -     albuterol sulfate HFA (Ventolin HFA) 108 (90 Base) MCG/ACT inhaler; Inhale 2 puffs Every 6 (Six) Hours As Needed for Wheezing or Shortness of Air.    Moderate episode of recurrent major depressive disorder (CMS/HCC)    Continue with current diabetes medications and testing of blood sugars at least 3-4 times daily and prn.  Encourage compliance with diabetic diet.  Continue gabapentin for diabetic peripheral neuropathy.  She will return for labs after the quarantine    Continue Lorazepam only when needed for severe anxiety    The patient has read and signed the [facility] Controlled Substance Contract.  I will continue to see patient for regular follow up appointments. Patient is well controlled on the medication.  ANGELITO has been reviewed by me and is updated every 3 months. The patient is aware of the potential for addiction and dependence.      Will have her follow up with the podiatrist when things with Covid-19 clear up.     Continue with albuterol for COPD when needed          This document has been electronically signed by Chantel Long MD on May 20, 2020 14:52

## 2020-06-12 RX ORDER — APIXABAN 5 MG/1
TABLET, FILM COATED ORAL
Qty: 60 TABLET | Refills: 0 | Status: SHIPPED | OUTPATIENT
Start: 2020-06-12

## 2020-06-12 RX ORDER — AMIODARONE HYDROCHLORIDE 200 MG/1
200 TABLET ORAL
Qty: 30 TABLET | Refills: 0 | Status: SHIPPED | OUTPATIENT
Start: 2020-06-12

## 2020-06-15 RX ORDER — DULOXETIN HYDROCHLORIDE 60 MG/1
CAPSULE, DELAYED RELEASE ORAL
Qty: 90 CAPSULE | Refills: 1 | Status: SHIPPED | OUTPATIENT
Start: 2020-06-15 | End: 2020-10-13

## 2020-06-19 RX ORDER — AMLODIPINE BESYLATE 10 MG/1
10 TABLET ORAL DAILY
Qty: 30 TABLET | Refills: 5 | Status: SHIPPED | OUTPATIENT
Start: 2020-06-19

## 2020-07-10 RX ORDER — PEN NEEDLE, DIABETIC 31 GX5/16"
NEEDLE, DISPOSABLE MISCELLANEOUS
Qty: 180 EACH | Refills: 5 | Status: SHIPPED | OUTPATIENT
Start: 2020-07-10

## 2020-08-12 RX ORDER — MONTELUKAST SODIUM 10 MG/1
10 TABLET ORAL NIGHTLY
Qty: 30 TABLET | Refills: 5 | Status: SHIPPED | OUTPATIENT
Start: 2020-08-12 | End: 2021-02-08

## 2020-08-13 RX ORDER — LEVOTHYROXINE SODIUM 0.1 MG/1
100 TABLET ORAL DAILY
Qty: 90 TABLET | Refills: 1 | Status: SHIPPED | OUTPATIENT
Start: 2020-08-13 | End: 2020-11-09

## 2020-08-20 ENCOUNTER — OFFICE VISIT (OUTPATIENT)
Dept: FAMILY MEDICINE CLINIC | Facility: CLINIC | Age: 85
End: 2020-08-20

## 2020-08-20 VITALS
HEART RATE: 78 BPM | DIASTOLIC BLOOD PRESSURE: 65 MMHG | HEIGHT: 67 IN | SYSTOLIC BLOOD PRESSURE: 124 MMHG | TEMPERATURE: 97.5 F | BODY MASS INDEX: 27.72 KG/M2

## 2020-08-20 DIAGNOSIS — I47.1 PAROXYSMAL SVT (SUPRAVENTRICULAR TACHYCARDIA) (HCC): ICD-10-CM

## 2020-08-20 DIAGNOSIS — E11.42 DIABETIC PERIPHERAL NEUROPATHY (HCC): ICD-10-CM

## 2020-08-20 DIAGNOSIS — I48.0 PAROXYSMAL ATRIAL FIBRILLATION (HCC): ICD-10-CM

## 2020-08-20 DIAGNOSIS — E11.8 CONTROLLED TYPE 2 DIABETES MELLITUS WITH COMPLICATION, WITH LONG-TERM CURRENT USE OF INSULIN (HCC): ICD-10-CM

## 2020-08-20 DIAGNOSIS — F41.9 ANXIETY: Primary | ICD-10-CM

## 2020-08-20 DIAGNOSIS — Z79.4 CONTROLLED TYPE 2 DIABETES MELLITUS WITH COMPLICATION, WITH LONG-TERM CURRENT USE OF INSULIN (HCC): ICD-10-CM

## 2020-08-20 PROBLEM — M79.672 BILATERAL FOOT PAIN: Status: RESOLVED | Noted: 2019-02-19 | Resolved: 2020-08-20

## 2020-08-20 PROBLEM — N30.90 KLEBSIELLA CYSTITIS: Status: RESOLVED | Noted: 2020-03-09 | Resolved: 2020-08-20

## 2020-08-20 PROBLEM — R30.0 DYSURIA: Status: RESOLVED | Noted: 2017-07-24 | Resolved: 2020-08-20

## 2020-08-20 PROBLEM — R06.00 DYSPNEA: Status: RESOLVED | Noted: 2018-04-18 | Resolved: 2020-08-20

## 2020-08-20 PROBLEM — B96.1 KLEBSIELLA CYSTITIS: Status: RESOLVED | Noted: 2020-03-09 | Resolved: 2020-08-20

## 2020-08-20 PROBLEM — R00.0 TACHYCARDIA: Status: RESOLVED | Noted: 2018-04-17 | Resolved: 2020-08-20

## 2020-08-20 PROBLEM — L82.1 SEBORRHEIC KERATOSIS: Status: RESOLVED | Noted: 2017-08-08 | Resolved: 2020-08-20

## 2020-08-20 PROBLEM — M79.671 BILATERAL FOOT PAIN: Status: RESOLVED | Noted: 2019-02-19 | Resolved: 2020-08-20

## 2020-08-20 PROBLEM — A49.8 KLEBSIELLA INFECTION: Status: RESOLVED | Noted: 2020-03-10 | Resolved: 2020-08-20

## 2020-08-20 PROBLEM — M25.561 RIGHT KNEE PAIN: Status: RESOLVED | Noted: 2018-01-02 | Resolved: 2020-08-20

## 2020-08-20 PROBLEM — A04.72 C. DIFFICILE DIARRHEA: Status: RESOLVED | Noted: 2020-03-10 | Resolved: 2020-08-20

## 2020-08-20 PROCEDURE — G2025 DIS SITE TELE SVCS RHC/FQHC: HCPCS | Performed by: FAMILY MEDICINE

## 2020-08-20 RX ORDER — GABAPENTIN 300 MG/1
CAPSULE ORAL
Qty: 90 CAPSULE | Refills: 2 | Status: SHIPPED | OUTPATIENT
Start: 2020-08-20

## 2020-08-20 RX ORDER — LORAZEPAM 1 MG/1
1 TABLET ORAL NIGHTLY PRN
Qty: 30 TABLET | Refills: 2 | Status: SHIPPED | OUTPATIENT
Start: 2020-08-20

## 2020-08-20 NOTE — PROGRESS NOTES
"Subjective   Nyla Piña is a 85 y.o. female.   This visit has been rescheduled as a phone visit to comply with patient safety concerns in accordance with CDC recommendations. Total time of discussion was 15 minutes.    You have chosen to receive care through a telephone visit. Do you consent to use a telephone visit for your medical care today? Yes  She is following up today on diabetes, diabetic neuropathy, and anxiety.  She needs refills of gabapentin for neuropathy and lorazepam which she takes prn for anxiety since her  .   She feels \"top-heavy\" like she could topple over. This happens pretty often.  She does use a walker.  She denies chest pain or palpitations when these episodes occur.  She does not feel like she is having any hit her ear fullness or congestion as the source and thinks it may be her heart.  She says her blood sugars are doing very well and she monitors blood sugar and blood pressure every day at home    History of Present Illness    The following portions of the patient's history were reviewed and updated as appropriate: allergies, current medications, past family history, past medical history, past social history, past surgical history and problem list.    Review of Systems   HENT: Negative.    Cardiovascular: Negative.    Genitourinary: Negative for flank pain, frequency, hematuria and urgency.   Musculoskeletal: Positive for gait problem.   Skin: Negative.    Allergic/Immunologic: Negative for immunocompromised state.   Neurological: Negative for syncope.   Hematological: Negative.    Psychiatric/Behavioral: Positive for sleep disturbance. Negative for agitation and dysphoric mood.   All other systems reviewed and are negative.      Objective   Physical Exam  Vitals:    20 1127   Height: 170.2 cm (67\")   PainSc: 0-No pain            Assessment/Plan   Nyla was seen today for med refill.    Diagnoses and all orders for this visit:    Anxiety  -     " LORazepam (ATIVAN) 1 MG tablet; Take 1 tablet by mouth At Night As Needed for Anxiety or Sleep.    Diabetic peripheral neuropathy (CMS/HCC)  -     gabapentin (NEURONTIN) 300 MG capsule; TAKE ONE CAPSULE BY MOUTH THREE TIMES A DAY    Controlled type 2 diabetes mellitus with complication, with long-term current use of insulin (CMS/HCC)    Paroxysmal SVT (supraventricular tachycardia) (CMS/HCC)    Paroxysmal atrial fibrillation (CMS/HCC)     The patient has read and signed the [facility] Controlled Substance Contract.  I will continue to see patient for regular follow up appointments. Patient is well controlled on the medication.  ANGELITO has been reviewed by me and is updated every 3 months. The patient is aware of the potential for addiction and dependence.     She has a-fib/arrythmia and is scheduled to see her cardiologist.  She says she is going to call them about seeing her earlier due to the rhythm issues.  Certainly she may be having cardiac arrythmias that are causing the dizzy spells and if cardiac w/u is negative she is to come in to see me for further eval.     Continue lorazepam prn for anxiety    Continue gabapentin for diabetic peripheral neuropathy    Continue with current diabetes medications and testing of blood sugars at least daily and prn.  Encourage compliance with diabetic diet.         This document has been electronically signed by Chantel Long MD on August 20, 2020 13:42

## 2020-08-24 DIAGNOSIS — E11.9 CONTROLLED TYPE 2 DIABETES MELLITUS WITHOUT COMPLICATION, WITH LONG-TERM CURRENT USE OF INSULIN (HCC): ICD-10-CM

## 2020-08-24 DIAGNOSIS — E06.3 HASHIMOTO'S THYROIDITIS: Primary | ICD-10-CM

## 2020-08-24 DIAGNOSIS — E78.2 MIXED HYPERLIPIDEMIA: ICD-10-CM

## 2020-08-24 DIAGNOSIS — E55.9 VITAMIN D DEFICIENCY: ICD-10-CM

## 2020-08-24 DIAGNOSIS — Z79.4 CONTROLLED TYPE 2 DIABETES MELLITUS WITHOUT COMPLICATION, WITH LONG-TERM CURRENT USE OF INSULIN (HCC): ICD-10-CM

## 2020-08-26 ENCOUNTER — LAB (OUTPATIENT)
Dept: LAB | Facility: OTHER | Age: 85
End: 2020-08-26

## 2020-08-26 LAB
ALBUMIN SERPL-MCNC: 4.3 G/DL (ref 3.5–5)
ALBUMIN/GLOB SERPL: 1.2 G/DL (ref 1.1–1.8)
ALP SERPL-CCNC: 89 U/L (ref 38–126)
ALT SERPL W P-5'-P-CCNC: 15 U/L
ANION GAP SERPL CALCULATED.3IONS-SCNC: 10 MMOL/L (ref 5–15)
AST SERPL-CCNC: 23 U/L (ref 14–36)
BASOPHILS # BLD AUTO: 0.04 10*3/MM3 (ref 0–0.2)
BASOPHILS NFR BLD AUTO: 0.5 % (ref 0–1.5)
BILIRUB SERPL-MCNC: 1.1 MG/DL (ref 0.2–1.3)
BUN SERPL-MCNC: 17 MG/DL (ref 7–23)
BUN/CREAT SERPL: 16.8 (ref 7–25)
CALCIUM SPEC-SCNC: 9.7 MG/DL (ref 8.4–10.2)
CHLORIDE SERPL-SCNC: 95 MMOL/L (ref 101–112)
CHOLEST SERPL-MCNC: 218 MG/DL (ref 150–200)
CO2 SERPL-SCNC: 27 MMOL/L (ref 22–30)
CREAT SERPL-MCNC: 1.01 MG/DL (ref 0.52–1.04)
DEPRECATED RDW RBC AUTO: 51.6 FL (ref 37–54)
EOSINOPHIL # BLD AUTO: 0.19 10*3/MM3 (ref 0–0.4)
EOSINOPHIL NFR BLD AUTO: 2.3 % (ref 0.3–6.2)
ERYTHROCYTE [DISTWIDTH] IN BLOOD BY AUTOMATED COUNT: 16.5 % (ref 12.3–15.4)
GFR SERPL CREATININE-BSD FRML MDRD: 52 ML/MIN/1.73 (ref 39–90)
GLOBULIN UR ELPH-MCNC: 3.5 GM/DL (ref 2.3–3.5)
GLUCOSE SERPL-MCNC: 201 MG/DL (ref 70–99)
HCT VFR BLD AUTO: 37.2 % (ref 34–46.6)
HDLC SERPL-MCNC: 75 MG/DL (ref 40–59)
HGB BLD-MCNC: 11.9 G/DL (ref 12–15.9)
LDLC SERPL CALC-MCNC: 127 MG/DL
LDLC/HDLC SERPL: 1.69 {RATIO} (ref 0–3.22)
LYMPHOCYTES # BLD AUTO: 3.23 10*3/MM3 (ref 0.7–3.1)
LYMPHOCYTES NFR BLD AUTO: 39.7 % (ref 19.6–45.3)
MCH RBC QN AUTO: 27.8 PG (ref 26.6–33)
MCHC RBC AUTO-ENTMCNC: 32 G/DL (ref 31.5–35.7)
MCV RBC AUTO: 86.9 FL (ref 79–97)
MONOCYTES # BLD AUTO: 0.69 10*3/MM3 (ref 0.1–0.9)
MONOCYTES NFR BLD AUTO: 8.5 % (ref 5–12)
NEUTROPHILS NFR BLD AUTO: 3.98 10*3/MM3 (ref 1.7–7)
NEUTROPHILS NFR BLD AUTO: 49 % (ref 42.7–76)
PLATELET # BLD AUTO: 416 10*3/MM3 (ref 140–450)
PMV BLD AUTO: 8.5 FL (ref 6–12)
POTASSIUM SERPL-SCNC: 4.6 MMOL/L (ref 3.4–5)
PROT SERPL-MCNC: 7.8 G/DL (ref 6.3–8.6)
RBC # BLD AUTO: 4.28 10*6/MM3 (ref 3.77–5.28)
SODIUM SERPL-SCNC: 132 MMOL/L (ref 137–145)
TRIGL SERPL-MCNC: 82 MG/DL
VLDLC SERPL-MCNC: 16.4 MG/DL
WBC # BLD AUTO: 8.13 10*3/MM3 (ref 3.4–10.8)

## 2020-08-26 PROCEDURE — 82306 VITAMIN D 25 HYDROXY: CPT | Performed by: NURSE PRACTITIONER

## 2020-08-26 PROCEDURE — 82607 VITAMIN B-12: CPT | Performed by: NURSE PRACTITIONER

## 2020-08-26 PROCEDURE — 85025 COMPLETE CBC W/AUTO DIFF WBC: CPT | Performed by: NURSE PRACTITIONER

## 2020-08-26 PROCEDURE — 36415 COLL VENOUS BLD VENIPUNCTURE: CPT | Performed by: NURSE PRACTITIONER

## 2020-08-26 PROCEDURE — 83036 HEMOGLOBIN GLYCOSYLATED A1C: CPT | Performed by: NURSE PRACTITIONER

## 2020-08-26 PROCEDURE — 80061 LIPID PANEL: CPT | Performed by: NURSE PRACTITIONER

## 2020-08-26 PROCEDURE — 80053 COMPREHEN METABOLIC PANEL: CPT | Performed by: NURSE PRACTITIONER

## 2020-08-26 PROCEDURE — 84443 ASSAY THYROID STIM HORMONE: CPT | Performed by: NURSE PRACTITIONER

## 2020-08-27 LAB
25(OH)D3 SERPL-MCNC: 44.3 NG/ML (ref 30–100)
HBA1C MFR BLD: 6.79 % (ref 4.8–5.6)
TSH SERPL DL<=0.05 MIU/L-ACNC: 3.98 UIU/ML (ref 0.27–4.2)
VIT B12 BLD-MCNC: 800 PG/ML (ref 211–946)

## 2020-08-28 ENCOUNTER — OFFICE VISIT (OUTPATIENT)
Dept: ENDOCRINOLOGY | Facility: CLINIC | Age: 85
End: 2020-08-28

## 2020-08-28 DIAGNOSIS — Z79.4 CONTROLLED TYPE 2 DIABETES MELLITUS WITH DIABETIC POLYNEUROPATHY, WITH LONG-TERM CURRENT USE OF INSULIN (HCC): ICD-10-CM

## 2020-08-28 DIAGNOSIS — I10 ESSENTIAL HYPERTENSION: ICD-10-CM

## 2020-08-28 DIAGNOSIS — E78.2 MIXED HYPERLIPIDEMIA: Primary | ICD-10-CM

## 2020-08-28 DIAGNOSIS — E55.9 VITAMIN D DEFICIENCY: ICD-10-CM

## 2020-08-28 DIAGNOSIS — E06.3 HASHIMOTO'S THYROIDITIS: ICD-10-CM

## 2020-08-28 DIAGNOSIS — E11.42 CONTROLLED TYPE 2 DIABETES MELLITUS WITH DIABETIC POLYNEUROPATHY, WITH LONG-TERM CURRENT USE OF INSULIN (HCC): ICD-10-CM

## 2020-08-28 PROCEDURE — 99443 PR PHYS/QHP TELEPHONE EVALUATION 21-30 MIN: CPT | Performed by: NURSE PRACTITIONER

## 2020-08-28 NOTE — PROGRESS NOTES
Subjective    Nyla Piña is a 85 y.o. female. she is here today for follow-up.    History of Present Illness       You have chosen to receive care through a telephone visit. Do you consent to use a telephone visit for your medical care today? Yes       This is a telephone visit due to pandemic and patient is following the CDC guidelines for social distancing               History of Present Illness         Reason - diabetes         Duration/Timing:Diabetes mellitus type 2, Age at onset of diabetes : 58 years, Onset of symptoms gradual         Timing constant      quality controlled      Severity Complications        She has been in the hospital for A.fib               Severity (Complications/Hospitalizations)  Secondary Macrovascular Complications: No CAD, No CVA, No PAD     Secondary Microvascular Complications: No Diabetic Nephropathy, Microalbuminuria, No proteinuria, No Diabetic Retinopathy, Diabetic Neuropathy     Context -- routine lab work      Diabetes Regimen: Insulin, oral medication,            Lab Results   Component Value Date    HGBA1C 6.79 (H) 08/26/2020                         Blood Glucose Readings      Checks 4 times daily up to 10 times daily      Using fili personal system       this am 109     States 80 up to 150         Diet      Low carb      Exercise:Does not exercise     Associated Signs/Symptoms  Hyperglycemic Symptoms:Polyruria, polydipsia, polyphagia, No blurred vision, No weight gain     Hypoglycemic Episodes:No documented hypoglycemia              The following portions of the patient's history were reviewed and updated as appropriate:   Past Medical History:   Diagnosis Date   • Acute bronchitis    • Acute sinusitis    • Acute upper respiratory infection    • Anxiety    • Atrial fibrillation (CMS/HCC)    • Cellulitis of lower leg    • Chronic urinary tract infection    • COPD (chronic obstructive pulmonary disease) (CMS/HCC)    • Cough    • Death of     • Diabetic  asymmetric polyneuropathy (CMS/HCC)    • Diabetic peripheral neuropathy (CMS/HCC)    • Diabetic polyneuropathy (CMS/HCC)    • Disease of nail    • Dorsalgia    • Flank pain    • Foot ulcer (CMS/HCC)    • Hashimoto's thyroiditis    • History of echocardiogram 2013   • Hypertensive disorder    • Insomnia    • Mixed hyperlipidemia    • Non-healing surgical wound    • Osteoarthritis of multiple joints    • Peripheral vascular disease (CMS/HCC)    • Seasonal allergic rhinitis    • Type II diabetes mellitus, uncontrolled (CMS/HCC)    • Upper respiratory infection    • Urinary tract infectious disease    • Urinary tract infectious disease    • Vitamin D deficiency      Past Surgical History:   Procedure Laterality Date   • EXCISION MASS LEG Left 2013    Excision of soft tissue mass of left leg   • HYSTERECTOMY      Age 27   • STEROID INJECTION  2016    Depo Medrol (Methylprednisone) 80mg (Acute upper respiratory infection, unspecified)      No family history on file.  OB History        2    Para   2    Term   2            AB        Living           SAB        TAB        Ectopic        Molar        Multiple        Live Births                  Current Outpatient Medications   Medication Sig Dispense Refill   • albuterol sulfate HFA (Ventolin HFA) 108 (90 Base) MCG/ACT inhaler Inhale 2 puffs Every 6 (Six) Hours As Needed for Wheezing or Shortness of Air. 1 inhaler 5   • amiodarone (PACERONE) 200 MG tablet TAKE 1 TABLET BY MOUTH DAILY. 30 tablet 0   • amLODIPine (NORVASC) 10 MG tablet TAKE 1 TABLET BY MOUTH DAILY. 30 tablet 5   • aspirin 81 MG tablet Take 81 mg by mouth daily.     • B-D ULTRAFINE III SHORT PEN 31G X 8 MM misc USE AND DISCARD 1 PEN NEEDLE 4 TIMES DAILY 180 each 5   • Calcium Carbonate (CALCIUM 600 PO) Take 2 tablets by mouth daily.     • diclofenac (VOLTAREN) 1 % gel gel Apply 4 g topically 4 (Four) Times a Day. 100 g 11   • DULoxetine (CYMBALTA) 60 MG capsule TAKE 1 CAPSULE  "DAILY 90 capsule 1   • ELIQUIS 5 MG tablet tablet TAKE 1 TABLET BY MOUTH EVERY 12 (TWELVE) HOURS. INDICATIONS: ATRIAL FIBRILLATION 60 tablet 0   • gabapentin (NEURONTIN) 300 MG capsule TAKE ONE CAPSULE BY MOUTH THREE TIMES A DAY 90 capsule 2   • HYDROcodone-acetaminophen (NORCO) 7.5-325 MG per tablet Take 1 tablet by mouth 2 (Two) Times a Day. 60 tablet 0   • insulin aspart (novoLOG FLEXPEN) 100 UNIT/ML solution pen-injector sc pen Take 15 units TID before each meal 15 pen 3   • Insulin Glargine (BASAGLAR KWIKPEN) 100 UNIT/ML injection pen Inject 15 Units under the skin Every Night. 5 pen 5   • irbesartan (AVAPRO) 150 MG tablet TAKE 1 TABLET BY MOUTH EVERY NIGHT. 30 tablet 5   • levothyroxine (SYNTHROID, LEVOTHROID) 100 MCG tablet Take 1 tablet by mouth Daily. 90 tablet 1   • LORazepam (ATIVAN) 1 MG tablet Take 1 tablet by mouth At Night As Needed for Anxiety or Sleep. 30 tablet 2   • metFORMIN (GLUCOPHAGE) 850 MG tablet TAKE 1 TABLET TWICE A DAY  WITH MEALS 180 tablet 1   • metoprolol succinate XL (TOPROL-XL) 25 MG 24 hr tablet Take 1 tablet by mouth Daily. (Patient taking differently: Take 50 mg by mouth Daily.) 90 tablet 3   • montelukast (SINGULAIR) 10 MG tablet TAKE 1 TABLET BY MOUTH EVERY NIGHT  DAYS. 30 tablet 5   • Needle, Disp, 31G X 5/16\" misc 1 each 4 (Four) Times a Day. Pen Needle; 400 each 3   • simvastatin (ZOCOR) 10 MG tablet Take 20 mg by mouth Daily.     • TRUE METRIX BLOOD GLUCOSE TEST test strip USE TO TEST FOUR TIMES DAILY 150 each 11   • TRUEPLUS LANCETS 28G misc TEST FOUR TIMES A  each 11   • vitamin D (ERGOCALCIFEROL) 1.25 MG (82282 UT) capsule capsule TAKE ONE CAPSULE BY MOUTH ONCE WEEKLY 12 capsule 11     Current Facility-Administered Medications   Medication Dose Route Frequency Provider Last Rate Last Dose   • cyanocobalamin injection 1,000 mcg  1,000 mcg Intramuscular Q28 Days Chantel Long MD   1,000 mcg at 03/07/19 1145     Allergies   Allergen Reactions   • Latex " Itching   • Penicillins Swelling and Rash     Social History     Socioeconomic History   • Marital status:      Spouse name: Not on file   • Number of children: Not on file   • Years of education: Not on file   • Highest education level: Not on file   Tobacco Use   • Smoking status: Never Smoker   • Smokeless tobacco: Never Used   Substance and Sexual Activity   • Alcohol use: No   • Drug use: No   • Sexual activity: Defer       Review of Systems  Review of Systems   Constitutional: Negative for activity change, appetite change, diaphoresis and fatigue.   HENT: Negative for facial swelling, sneezing, sore throat, tinnitus, trouble swallowing and voice change.    Eyes: Negative for photophobia, pain, discharge, redness, itching and visual disturbance.   Respiratory: Negative for apnea, cough, choking, chest tightness and shortness of breath.    Cardiovascular: Negative for chest pain, palpitations and leg swelling.   Gastrointestinal: Negative for abdominal distention, abdominal pain, constipation, diarrhea, nausea and vomiting.   Endocrine: Negative for cold intolerance, heat intolerance, polydipsia, polyphagia and polyuria.   Genitourinary: Negative for difficulty urinating, dysuria, frequency, hematuria and urgency.   Musculoskeletal: Negative for arthralgias, back pain, gait problem, joint swelling, myalgias, neck pain and neck stiffness.   Skin: Negative for color change, pallor, rash and wound.   Neurological: Negative for dizziness, tremors, weakness, light-headedness, numbness and headaches.   Hematological: Negative for adenopathy. Does not bruise/bleed easily.   Psychiatric/Behavioral: Negative for behavioral problems, confusion and sleep disturbance.        Objective    There were no vitals taken for this visit.  Physical Exam   Constitutional: She is oriented to person, place, and time. She appears well-developed and well-nourished. No distress.   HENT:   Head: Normocephalic and atraumatic.   Right  Ear: External ear normal.   Left Ear: External ear normal.   Nose: Nose normal.   Eyes: Pupils are equal, round, and reactive to light. Conjunctivae and EOM are normal.   Neck: Normal range of motion. Neck supple. No tracheal deviation present. No thyromegaly present.   Cardiovascular: Normal rate, regular rhythm and normal heart sounds.   No murmur heard.  Pulmonary/Chest: Effort normal and breath sounds normal. No respiratory distress. She has no wheezes.   Abdominal: Soft. Bowel sounds are normal. There is no tenderness. There is no rebound and no guarding.   Musculoskeletal: Normal range of motion. She exhibits no edema, tenderness or deformity.   Neurological: She is alert and oriented to person, place, and time. No cranial nerve deficit.   Skin: Skin is warm and dry. No rash noted.   Psychiatric: She has a normal mood and affect. Her behavior is normal. Judgment and thought content normal.     patient took her vitals      BP  35/73    HR - 77     Temp -- 97.5      Lab Review  Glucose (mg/dL)   Date Value   08/26/2020 201 (H)   03/10/2020 213 (H)   03/09/2020 170 (H)     Sodium (mmol/L)   Date Value   08/26/2020 132 (L)   03/10/2020 134 (L)   03/09/2020 136     Potassium (mmol/L)   Date Value   08/26/2020 4.6   03/10/2020 4.4   03/09/2020 4.8     Chloride (mmol/L)   Date Value   08/26/2020 95 (L)   03/10/2020 99   03/09/2020 99     CO2 (mmol/L)   Date Value   08/26/2020 27.0   03/10/2020 22.0   03/09/2020 23.0     BUN (mg/dL)   Date Value   08/26/2020 17   03/10/2020 13   03/09/2020 14     Creatinine (mg/dL)   Date Value   08/26/2020 1.01   03/10/2020 0.69   03/09/2020 0.73     Hemoglobin A1C (%)   Date Value   08/26/2020 6.79 (H)   02/06/2020 7.80 (H)   08/06/2019 6.90 (H)     Triglycerides (mg/dL)   Date Value   08/26/2020 82   08/06/2019 158 (H)   02/05/2019 151 (H)     LDL Cholesterol  (mg/dL)   Date Value   08/26/2020 127 (H)   08/06/2019 143 (H)   02/05/2019 103 (H)     LDLCALC ELECT (mg/dl)   Date Value    07/20/2016 103       Assessment/Plan      1. Mixed hyperlipidemia    2. Essential hypertension    3. Vitamin D deficiency    4. Hashimoto's thyroiditis    5. Controlled type 2 diabetes mellitus with diabetic polyneuropathy, with long-term current use of insulin (CMS/[facility])    .    Medications prescribed:  Outpatient Encounter Medications as of 8/28/2020   Medication Sig Dispense Refill   • albuterol sulfate HFA (Ventolin HFA) 108 (90 Base) MCG/ACT inhaler Inhale 2 puffs Every 6 (Six) Hours As Needed for Wheezing or Shortness of Air. 1 inhaler 5   • amiodarone (PACERONE) 200 MG tablet TAKE 1 TABLET BY MOUTH DAILY. 30 tablet 0   • amLODIPine (NORVASC) 10 MG tablet TAKE 1 TABLET BY MOUTH DAILY. 30 tablet 5   • aspirin 81 MG tablet Take 81 mg by mouth daily.     • B-D ULTRAFINE III SHORT PEN 31G X 8 MM misc USE AND DISCARD 1 PEN NEEDLE 4 TIMES DAILY 180 each 5   • Calcium Carbonate (CALCIUM 600 PO) Take 2 tablets by mouth daily.     • diclofenac (VOLTAREN) 1 % gel gel Apply 4 g topically 4 (Four) Times a Day. 100 g 11   • DULoxetine (CYMBALTA) 60 MG capsule TAKE 1 CAPSULE DAILY 90 capsule 1   • ELIQUIS 5 MG tablet tablet TAKE 1 TABLET BY MOUTH EVERY 12 (TWELVE) HOURS. INDICATIONS: ATRIAL FIBRILLATION 60 tablet 0   • gabapentin (NEURONTIN) 300 MG capsule TAKE ONE CAPSULE BY MOUTH THREE TIMES A DAY 90 capsule 2   • HYDROcodone-acetaminophen (NORCO) 7.5-325 MG per tablet Take 1 tablet by mouth 2 (Two) Times a Day. 60 tablet 0   • insulin aspart (novoLOG FLEXPEN) 100 UNIT/ML solution pen-injector sc pen Take 15 units TID before each meal 15 pen 3   • Insulin Glargine (BASAGLAR KWIKPEN) 100 UNIT/ML injection pen Inject 15 Units under the skin Every Night. 5 pen 5   • irbesartan (AVAPRO) 150 MG tablet TAKE 1 TABLET BY MOUTH EVERY NIGHT. 30 tablet 5   • levothyroxine (SYNTHROID, LEVOTHROID) 100 MCG tablet Take 1 tablet by mouth Daily. 90 tablet 1   • LORazepam (ATIVAN) 1 MG tablet Take 1 tablet by mouth At Night As Needed for  "Anxiety or Sleep. 30 tablet 2   • metFORMIN (GLUCOPHAGE) 850 MG tablet TAKE 1 TABLET TWICE A DAY  WITH MEALS 180 tablet 1   • metoprolol succinate XL (TOPROL-XL) 25 MG 24 hr tablet Take 1 tablet by mouth Daily. (Patient taking differently: Take 50 mg by mouth Daily.) 90 tablet 3   • montelukast (SINGULAIR) 10 MG tablet TAKE 1 TABLET BY MOUTH EVERY NIGHT  DAYS. 30 tablet 5   • Needle, Disp, 31G X 5/16\" misc 1 each 4 (Four) Times a Day. Pen Needle; 400 each 3   • simvastatin (ZOCOR) 10 MG tablet Take 20 mg by mouth Daily.     • TRUE METRIX BLOOD GLUCOSE TEST test strip USE TO TEST FOUR TIMES DAILY 150 each 11   • TRUEPLUS LANCETS 28G misc TEST FOUR TIMES A  each 11   • vitamin D (ERGOCALCIFEROL) 1.25 MG (40563 UT) capsule capsule TAKE ONE CAPSULE BY MOUTH ONCE WEEKLY 12 capsule 11     Facility-Administered Encounter Medications as of 8/28/2020   Medication Dose Route Frequency Provider Last Rate Last Dose   • cyanocobalamin injection 1,000 mcg  1,000 mcg Intramuscular Q28 Days Chantel Long MD   1,000 mcg at 03/07/19 1145       Orders placed during this encounter include:  Orders Placed This Encounter   Procedures   • Comprehensive Metabolic Panel     Standing Status:   Future     Standing Expiration Date:   8/28/2021   • Hemoglobin A1c     Standing Status:   Future     Standing Expiration Date:   8/28/2021   • Vitamin D 25 Hydroxy     Standing Status:   Future     Standing Expiration Date:   8/28/2021   • TSH     Standing Status:   Future     Standing Expiration Date:   8/28/2021   • Lipid Panel     Standing Status:   Future     Standing Expiration Date:   8/28/2021   • CBC & Differential     Standing Status:   Future     Standing Expiration Date:   8/28/2021     Order Specific Question:   Manual Differential     Answer:   No     Glycemic Management     Diabetes Mellitus type 2                  Lab Results   Component Value Date    HGBA1C 6.79 (H) 08/26/2020                   Novolog mix stopped "   iLuuvia stopped it due to headaches  Glimepiride stopped caused higher sugars         Metformin 850 mg one tablet po BID         Basaglar 15 units      Novolog for meals      Breakfast -- 6     Lunch -- 7     Supper -- 8        Patient use [employer] personal system     She gave me her readings over the phone      Averaging 146            Renay  has allowed the patient to minimize hypoglycemia as alarms have predicted and avoided them    She also uses the arrows on the renay  as follows:    If arrow is horizontal , she uses the predicted bolus    If the arrow is slanted up or down-- she increase or decrease by 4 units    If the arrow is vertical up or down - she increases or decreases by 2 unit                      Lipid Management      Simvastatin 20 mg one at bedtime                     Total Cholesterol   Date Value Ref Range Status   08/06/2019 232 (H) 150 - 200 mg/dL Final            Triglycerides   Date Value Ref Range Status   08/06/2019 158 (H) <=150 mg/dL Final            HDL Cholesterol   Date Value Ref Range Status   08/06/2019 57 40 - 59 mg/dL Final            LDL Cholesterol    Date Value Ref Range Status   08/06/2019 143 (H) <=100 mg/dL Final              LDLCALC ELECT   Date Value Ref Range Status   07/20/2016 103 0 - 129 mg/dl Final       Comment:       LDL DESIRED: < 130 MG/DL            Blood Pressure Management            amlodipine 5 mg daily      Valsartan -- on recall      Amiodarone 200 mg daily            Microvascular Complication Monitoring      Cymbalta 60 mg one daily      Gabapentin 300 mg one TID        hydrocodone-acetaminophen 7.5- 500 mg 2 times daily      3- 15 Microalbuminuria         Component      Latest Ref Rng & Units 2/6/2020 2/6/2020          10:10 AM 10:10 AM   Protein/Creatinine Ratio      0.0 - 200.0 mg/G Crea 135.1     Creatinine, Urine      mg/dL 170.3 170.3   Total Protein, Urine      mg/dL 23.0     Microalbumin/Creatinine Ratio      mg/g   27.0   Microalbumin, Urine       mg/dL   4.6                  Bone Health      h o vitamin d def      vitamin d 50,000 units weekly               Component      Latest Ref Rng & Units 2/6/2020   25 Hydroxy, Vitamin D      30.0 - 100.0 ng/ml 36.8         Immunization: influenza vaccine Oct. 2019 , shingles vaccine Oct. 2016         Other Diabetes Related Aspects         Hypothyroidism           Levothyroxine 100 mcg one daily Monday through Friday and 1/2 tablet on Saturday and none on Sunday             Lab Results   Component Value Date    TSH 3.980 08/26/2020             We spent 25minutes including reviewing the patients electronic chart, reviewing medications, reviewing labs, active problems     I provided advice regarding diabetes management, dietary changes, adjustments of medication, self titration of insulin, refilled medications, ordering labs, future appointments     Patient was advised to contact the office if there are unanswered questions, trouble with blood glucose management, or any concerns                4. Follow-up: Return in about 6 months (around 2/28/2021).              This document has been electronically signed by MARTHA Gonzalez on August 28, 2020 13:44

## 2020-09-16 RX ORDER — METOPROLOL SUCCINATE 25 MG/1
50 TABLET, EXTENDED RELEASE ORAL DAILY
Qty: 90 TABLET | Refills: 3 | Status: SHIPPED | OUTPATIENT
Start: 2020-09-16

## 2020-10-13 RX ORDER — DULOXETIN HYDROCHLORIDE 60 MG/1
CAPSULE, DELAYED RELEASE ORAL
Qty: 90 CAPSULE | Refills: 1 | Status: SHIPPED | OUTPATIENT
Start: 2020-10-13

## 2020-10-16 ENCOUNTER — DOCUMENTATION (OUTPATIENT)
Dept: ENDOCRINOLOGY | Facility: CLINIC | Age: 85
End: 2020-10-16

## 2020-10-20 DIAGNOSIS — I48.0 PAROXYSMAL ATRIAL FIBRILLATION (HCC): Primary | ICD-10-CM

## 2020-11-09 RX ORDER — LEVOTHYROXINE SODIUM 0.1 MG/1
TABLET ORAL
Qty: 90 TABLET | Refills: 1 | Status: SHIPPED | OUTPATIENT
Start: 2020-11-09 | End: 2021-11-22

## 2021-05-25 ENCOUNTER — DOCUMENTATION (OUTPATIENT)
Dept: ENDOCRINOLOGY | Facility: CLINIC | Age: 86
End: 2021-05-25

## 2021-06-07 ENCOUNTER — TELEPHONE (OUTPATIENT)
Dept: ENDOCRINOLOGY | Facility: CLINIC | Age: 86
End: 2021-06-07

## 2021-06-07 NOTE — TELEPHONE ENCOUNTER
[facility] is needing the paperwork we sent them to be refaxed, says it was cut off. [phone].

## 2021-09-14 ENCOUNTER — DOCUMENTATION (OUTPATIENT)
Dept: ENDOCRINOLOGY | Facility: CLINIC | Age: 86
End: 2021-09-14

## 2021-11-22 RX ORDER — LEVOTHYROXINE SODIUM 0.1 MG/1
TABLET ORAL
Qty: 90 TABLET | Refills: 1 | Status: SHIPPED | OUTPATIENT
Start: 2021-11-22

## 2022-04-13 NOTE — PROGRESS NOTES
Subjective   Nyla Piña is a 83 y.o. female with hypertension, diabetes, and arthritis here today for follow-up and refills.  She takes pain medicine only when needed for the more severe episodes of pain.  She's been seeing the back surgeon recently for some thoracic back pain and arthritis.  He prescribed her an Sullivan corset brace but she hasn't yet received it.  She feels like it will help her.    History of Present Illness   Back Pain   This is a chronic problem. The current episode started more than 1 year ago. The problem occurs constantly. The problem is unchanged. The pain is present in the thoracic spine. The quality of the pain is described as shooting, stabbing and aching.  . The pain is at a severity of 8/10. The pain is severe. The pain is the same all the time. The symptoms are aggravated by standing, twisting, position, bending and sitting. Stiffness is present at night, all day and in the morning. Associated symptoms include leg pain and tingling. Pertinent negatives include no abdominal pain, bladder incontinence, bowel incontinence, chest pain, dysuria, fever, headaches, numbness, paresis, paresthesias, pelvic pain, perianal numbness, weakness or weight loss. Risk factors include sedentary lifestyle. Patient has tried analgesics, NSAIDs, muscle relaxant and heat for the symptoms. The treatment provided mild relief.     The following portions of the patient's history were reviewed and updated as appropriate: allergies, current medications, past family history, past medical history, past social history, past surgical history and problem list.    Review of Systems   HENT: Negative.    Cardiovascular: Negative.    Gastrointestinal: Negative.    Genitourinary: Negative for flank pain, frequency, hematuria and urgency.   Musculoskeletal: Positive for gait problem.   Skin: Negative.    Allergic/Immunologic: Negative for immunocompromised state.   Neurological: Negative for syncope.    Hematological: Negative.    Psychiatric/Behavioral: Positive for sleep disturbance. Negative for agitation and dysphoric mood.   All other systems reviewed and are negative.    Objective   Physical Exam   Constitutional: She is oriented to person, place, and time. She appears well-developed and well-nourished.   HENT:   Head: Normocephalic and atraumatic.   Nose: Nose normal.   Mouth/Throat: Oropharynx is clear and moist.       Eyes: Conjunctivae and EOM are normal. Pupils are equal, round, and reactive to light.   Neck: Normal range of motion. Neck supple. No JVD present. No tracheal deviation present. No thyromegaly present.   Cardiovascular: Regular rhythm and intact distal pulses.   Murmur heard.  2/6 holosystolic murmur is noted today   Pulmonary/Chest: Effort normal and breath sounds normal. She has no wheezes.   Abdominal: Soft. Bowel sounds are normal. She exhibits no distension. There is no tenderness.   Musculoskeletal: She exhibits tenderness. She exhibits no edema.   Lymphadenopathy:     She has no cervical adenopathy.   Neurological: She is alert and oriented to person, place, and time. Coordination normal.   Skin: Skin is warm and dry. No rash noted.       Psychiatric: She has a normal mood and affect.   Nursing note and vitals reviewed.    Assessment/Plan   Nyla was seen today for follow-up.    Diagnoses and all orders for this visit:    Osteoarthritis of multiple joints, unspecified osteoarthritis type    Chronic midline thoracic back pain    Other orders  -     HYDROcodone-acetaminophen (NORCO) 7.5-325 MG per tablet; Take 1 tablet by mouth 2 (Two) Times a Day.    We'll refill pain medicine for her to take when needed as she is not a good NSAID candidate given her age and other conditions including hypertension.  The patient has read and signed the [facility] Controlled Substance Contract.  I will continue to see patient for regular follow up appointments. Patient is well controlled on the  medication.  ANGELITO has been reviewed by me and is updated every 3 months. The patient is aware of the potential for addiction and dependence.   Will check on the [place] brace and follow-up accordingly           This document has been electronically signed by Chantel Long MD on December 20, 2018 11:02 AM                 full range of motion in all extremities

## 2022-08-24 NOTE — PROGRESS NOTES
Patient assessed and charted on by this RN. VSS. A&Ox4. Pain 10 out of 10 in abdomen, tendneress upon palptation and is nauseous. Given PRN dilaudid and Zofran. Orders to put NG tube in. Currently attempting, patient not tolerating well. NGT stated has to go in and will try again. Call light within reach. Will continue to monitor. Subjective   Nyla Piña is a 83 y.o. female who presents to the office for follow-up on hypertension.  She saw Dr. Betts on Friday to make the medicine changes.  The patient's taking amlodipine in the mornings, irbesartan at night and her blood pressure has been doing better.  We recently switched her off the valsartan and due to its recall and this is when her blood pressure started getting high.    History of Present Illness   Hypertension   This is a chronic problem. The current episode started more than 1 year ago. The problem has been waxing and waning since onset. Associated symptoms include anxiety, headaches and malaise/fatigue. Pertinent negatives include no blurred vision, chest pain, neck pain, orthopnea, palpitations, peripheral edema, PND, shortness of breath or sweats. There are no associated agents to hypertension. Risk factors for coronary artery disease include dyslipidemia and family history. Past treatments include calcium channel blockers, ARB's.  The current treatment provides moderate improvement. There are no compliance problems.      The following portions of the patient's history were reviewed and updated as appropriate: allergies, current medications, past family history, past medical history, past social history, past surgical history and problem list.    Review of Systems   HENT: Negative.    Respiratory: Positive for cough.    Cardiovascular: Negative.    Gastrointestinal: Negative.    Genitourinary: Negative for flank pain, frequency, hematuria and urgency.   Musculoskeletal: Positive for gait problem.   Skin: Negative.    Allergic/Immunologic: Negative for immunocompromised state.   Neurological: Negative for dizziness, tremors, seizures and syncope.   Hematological: Negative.    Psychiatric/Behavioral: Positive for sleep disturbance. Negative for agitation, confusion and dysphoric mood. The patient is not nervous/anxious.    All other systems reviewed and are  negative.    Objective   Physical Exam   Constitutional: She is oriented to person, place, and time. She appears well-developed and well-nourished.   HENT:   Head: Normocephalic and atraumatic.   Nose: Nose normal.   Mouth/Throat: Oropharynx is clear and moist.   Eyes: Pupils are equal, round, and reactive to light. Conjunctivae and EOM are normal.   Neck: Normal range of motion. Neck supple. No JVD present. No tracheal deviation present. No thyromegaly present.   Cardiovascular: Regular rhythm and intact distal pulses.    Murmur heard.  2/6 holosystolic murmur is noted today   Pulmonary/Chest: Effort normal and breath sounds normal. She has no wheezes.   Abdominal: Soft. Bowel sounds are normal. She exhibits no distension. There is no tenderness.   Musculoskeletal: She exhibits tenderness. She exhibits no edema.   Lymphadenopathy:     She has no cervical adenopathy.   Neurological: She is alert and oriented to person, place, and time. Coordination normal.   Skin: Skin is warm and dry. No rash noted.       Psychiatric: She has a normal mood and affect.   Nursing note and vitals reviewed.    Assessment/Plan   Nyla was seen today for hypertension.    Diagnoses and all orders for this visit:    Essential hypertension    Continue with losartan and amlodipine as it seems to be working out well for her blood pressure and this regimen and she will return as scheduled.  Continue to monitor blood pressures at home and contact me for elevation           This document has been electronically signed by Chantel Long MD on August 8, 2018 11:00 AM

## 2025-03-08 NOTE — MR AVS SNAPSHOT
"                        Nyla Piña   1/30/2017 12:30 PM   Treatment    Dept Phone:  [phone]   Encounter #:  [id number]    Provider:  Andrea Fuller PTA   Department:  [facility]                Your Full Care Plan              Your Updated Medication List          This list is accurate as of: 1/30/17  1:13 PM.  Always use your most recent med list.                ALPRAZolam 0.25 MG tablet   Commonly known as:  XANAX   Take 1 tablet as needed for anxiety       aspirin 81 MG tablet       CALCIUM 600 PO       cefdinir 300 MG capsule   Commonly known as:  OMNICEF       DULoxetine 60 MG capsule   Commonly known as:  CYMBALTA   Take 1 capsule by mouth Daily.       ergocalciferol 27808 UNITS capsule   Commonly known as:  ERGOCALCIFEROL   Take 1 capsule by mouth 1 (one) time per week.       fluticasone 50 MCG/ACT nasal spray   Commonly known as:  FLONASE       gabapentin 300 MG capsule   Commonly known as:  NEURONTIN   Take 1 capsule by mouth 3 (Three) Times a Day. TAKE ONE CAPSULE EVERY MORNING AND 2 CAPSULES EVERY EVENING       HYDROcodone-acetaminophen 7.5-325 MG per tablet   Commonly known as:  NORCO       Insulin Glargine 100 UNIT/ML injection pen   Commonly known as:  LANTUS SOLOSTAR   Inject 15 Units under the skin every night.       levothyroxine 100 MCG tablet   Commonly known as:  SYNTHROID, LEVOTHROID   Take 1 tablet by mouth Daily.       lisinopril 20 MG tablet   Commonly known as:  PRINIVIL,ZESTRIL       loratadine 10 MG tablet   Commonly known as:  CLARITIN   1 TABLET(S) BY MOUTH DAILY       LORazepam 1 MG tablet   Commonly known as:  ATIVAN   Take 1 tablet by mouth At Night As Needed for anxiety or sleep.       metFORMIN 850 MG tablet   Commonly known as:  GLUCOPHAGE   Take 1 tablet by mouth 2 (Two) Times a Day With Meals.       Needle (Disp) 31G X 5/16\" misc   1 each 4 (Four) Times a Day. Pen Needle;       nitrofurantoin (macrocrystal-monohydrate) 100 MG capsule   Commonly " known as:  MACROBID   Take 1 capsule by mouth 2 (Two) Times a Day.       * insulin aspart 100 UNIT/ML solution pen-injector sc pen   Commonly known as:  novoLOG FLEXPEN   Inject 2-8 Units under the skin 3 (three) times a day with meals. INJECT 2-8 UNITS THREE TIMES A DAY BEFORE MEALS       * NOVOLOG FLEXPEN 100 UNIT/ML solution pen-injector sc pen   Generic drug:  insulin aspart   INJECT 2-8 UNITS SUBCUTANEOUSLY THREE TIMES A DAY BEFORE MEALS       simvastatin 20 MG tablet   Commonly known as:  ZOCOR       sulfamethoxazole-trimethoprim 800-160 MG per tablet   Commonly known as:  BACTRIM DS   Take 1 tablet by mouth 2 (two) times a day.       traZODone 50 MG tablet   Commonly known as:  DESYREL   Take 1 tablet by mouth every night. 1-2 tablets at bedtime       TRUEPLUS LANCETS 33G misc   1 each 4 (four) times a day.       * glucose blood test strip   Commonly known as:  TRUETEST TEST   USE TO TEST FOUR TIMES A DAY       * TRUETEST TEST test strip   Generic drug:  glucose blood   USE TO TEST FOUR TIMES A DAY       VENTOLIN HFA IN       * Notice:  This list has 4 medication(s) that are the same as other medications prescribed for you. Read the directions carefully, and ask your doctor or other care provider to review them with you.            You Were Diagnosed With        Codes Comments    Bilateral chronic knee pain    -  Primary ICD-10-CM: M25.561, M25.562, G89.29  ICD-9-CM: 719.46, 338.29       Instructions     None    Patient Instructions History      Upcoming Appointments     Visit Type Date Time Department    TREATMENT 1/30/2017 12:30 PM [facility]    TREATMENT 2/1/2017  2:45 PM [facility]    FOLLOW UP 3/22/2017 10:15 AM [facility]     Our records indicate that you have declined [employer] signup. If you would like to sign up for [employer], please email [email or web address] or call [phone] to obtain an activation code.             Other  Info from Your Visit           Your Appointments     Feb 01, 2017  2:45 PM CST   Therapy Treatment with Andrea Fuller PTA   [facility] PHYSICAL THERAPY (--)    [facility] PHYSICAL THERAPY (--)   [phone]            Mar 22, 2017 10:15 AM CDT   Follow Up with MARTHA Gonzalez   [facility] MEDICAL [facility] ENDOCRINOLOGY (--)    [address]  [facility] [place] [address] [place] [address]   [phone]           Arrive 15 minutes prior to appointment.              Allergies     Penicillins  Swelling, Rash      Vital Signs     Smoking Status                   Never Smoker           Problems and Diagnoses Noted     Bilateral chronic knee pain    -  Primary         27.5